# Patient Record
Sex: FEMALE | Race: BLACK OR AFRICAN AMERICAN | Employment: OTHER | URBAN - METROPOLITAN AREA
[De-identification: names, ages, dates, MRNs, and addresses within clinical notes are randomized per-mention and may not be internally consistent; named-entity substitution may affect disease eponyms.]

---

## 2017-02-07 ENCOUNTER — TELEPHONE (OUTPATIENT)
Dept: SURGERY | Age: 57
End: 2017-02-07

## 2017-02-07 ENCOUNTER — OFFICE VISIT (OUTPATIENT)
Dept: SURGERY | Age: 57
End: 2017-02-07

## 2017-02-07 VITALS
SYSTOLIC BLOOD PRESSURE: 110 MMHG | DIASTOLIC BLOOD PRESSURE: 70 MMHG | HEART RATE: 74 BPM | WEIGHT: 248 LBS | OXYGEN SATURATION: 96 % | HEIGHT: 62 IN | BODY MASS INDEX: 45.64 KG/M2 | TEMPERATURE: 98.4 F | RESPIRATION RATE: 20 BRPM

## 2017-02-07 DIAGNOSIS — K82.8 PORCELAIN GALLBLADDER: Primary | ICD-10-CM

## 2017-02-07 NOTE — PROGRESS NOTES
1. Have you been to the ER, urgent care clinic since your last visit? Hospitalized since your last visit? No    2. Have you seen or consulted any other health care providers outside of the [address] [place] [address] since your last visit? Include any pap smears or colon screening.  No

## 2017-02-07 NOTE — TELEPHONE ENCOUNTER
I called and spoke to the patient's mental health worker Lord Moncada and he has some questions about her upcoming surgery. I transferred him to Tamara Diaz our surgery scheduler.

## 2017-02-08 PROBLEM — K82.8 PORCELAIN GALLBLADDER: Status: ACTIVE | Noted: 2017-02-08

## 2017-02-09 NOTE — PROGRESS NOTES
Subjective:      Sakshi Higginbotham is a 49 y.o. female presents for ongoing evaluation of back pain and porcelain gallbladder. Appetite is fair. Eating a regular diet without difficulty. Bowel movements are constipated. The patient is voiding without difficulty. She continues to experience back pain; no nausea, vomiting, fever, or chills; no discrete abdominal pain. Objective:     Visit Vitals    /70 (BP 1 Location: Right arm, BP Patient Position: Sitting)    Pulse 74    Temp 98.4 °F (36.9 °C)    Resp 20    Ht 5' 2\" (1.575 m)    Wt 248 lb (112.5 kg)    SpO2 96%    BMI 45.36 kg/m2       General:  alert, cooperative, no distress, appears stated age, morbidly obese   Abdomen: deferred   Incision:   deferred     Assessment:     Porcelain gallbladder (chronic cholecystitis) with back pain; while her symptoms of back pain are not suggestive of biliary source, we discussed porcelain gallbladder and malignant potential and role of cholecystectomy. She will discuss further with her  and contact us if she desires to proceed with cholecystectomy. Plan:     1. Continue current medications. 2. Low fat diet. 3. Patient will contact us if she desires to proceed with cholecystectomy.

## 2017-02-09 NOTE — PATIENT INSTRUCTIONS
Cholecystectomy: Before Your Surgery  What is cholecystectomy? Cholecystectomy (se-bwu-rwd-LEX-tuh-bradford) is a type of surgery. It removes a diseased gallbladder. This surgery is usually done as a laparoscopic surgery. The doctor puts a lighted tube and other surgical tools through small cuts (incisions) in your belly. The tube is called a scope. It lets your doctor see your organs so he or she can do the surgery. The incisions leave scars that fade with time. Most people go home the same day. You probably will feel better each day. Most people have only a small amount of pain after 1 week. If you have a desk job, you can probably go back to work in 1 to 2 weeks. If you lift heavy objects or have a very active job, it may take up to 4 weeks. In some cases, open surgery is the best choice. Your doctor may choose open surgery in advance. Or he or she may choose it in the middle of laparoscopic surgery. In open surgery, the doctor makes a larger incision in your upper belly. If you have open surgery, you will probably stay in the hospital for 2 to 4 days. And it may take 4 to 6 weeks to get back to your normal routine. Follow-up care is a key part of your treatment and safety. Be sure to make and go to all appointments, and call your doctor if you are having problems. It's also a good idea to know your test results and keep a list of the medicines you take. What happens before surgery? Surgery can be stressful. This information will help you understand what you can expect. And it will help you safely prepare for surgery. Preparing for surgery  · Understand exactly what surgery is planned, along with the risks, benefits, and other options. · Tell your doctors ALL the medicines, vitamins, supplements, and herbal remedies you take. Some of these can increase the risk of bleeding or interact with anesthesia.   · If you take blood thinners, such as warfarin (Coumadin), clopidogrel (Plavix), or aspirin, be sure to talk to your doctor. He or she will tell you if you should stop taking these medicines before your surgery. Make sure that you understand exactly what your doctor wants you to do. · Your doctor will tell you which medicines to take or stop before your surgery. You may need to stop taking certain medicines a week or more before surgery. So talk to your doctor as soon as you can. · If you have an advance directive, let your doctor know. It may include a living will and a durable power of  for health care. Bring a copy to the hospital. If you don't have one, you may want to prepare one. It lets your doctor and loved ones know your health care wishes. Doctors advise that everyone prepare these papers before any type of surgery or procedure. · You may need to empty your colon with an enema or laxative. Your doctor will tell you how to do this. What happens on the day of surgery? · Follow the instructions exactly about when to stop eating and drinking. If you don't, your surgery may be canceled. If your doctor told you to take your medicines on the day of surgery, take them with only a sip of water. · Take a bath or shower before you come in for your surgery. Do not apply lotions, perfumes, deodorants, or nail polish. · Do not shave the surgical site yourself. · Take off all jewelry and piercings. And take out contact lenses, if you wear them. At the hospital or surgery center  · Bring a picture ID. · The area for surgery is often marked to make sure there are no errors. · You will be kept comfortable and safe by your anesthesia provider. You will be asleep during the surgery. · The surgery usually takes 1 to 2 hours. Going home  · Be sure you have someone to drive you home. Anesthesia and pain medicine make it unsafe for you to drive. · You will be given more specific instructions about recovering from your surgery.  They will cover things like diet, wound care, follow-up care, driving, and getting back to your normal routine. When should you call your doctor? · You have questions or concerns. · You don't understand how to prepare for your surgery. · You become ill before the surgery (such as fever, flu, or a cold). · You need to reschedule or have changed your mind about having the surgery. Where can you learn more? Go to http://sterling-ishaan.info/. Enter G195 in the search box to learn more about \"Cholecystectomy: Before Your Surgery. \"  Current as of: August 9, 2016  Content Version: 11.1  © 5396-4643 [employer]. Care instructions adapted under license by [employer] (which disclaims liability or warranty for this information). If you have questions about a medical condition or this instruction, always ask your healthcare professional. [address] any warranty or liability for your use of this information.

## 2017-02-24 ENCOUNTER — HOSPITAL ENCOUNTER (OUTPATIENT)
Dept: PREADMISSION TESTING | Age: 57
Discharge: HOME | End: 2017-02-24
Payer: MEDICARE

## 2017-02-24 ENCOUNTER — HOSPITAL ENCOUNTER (OUTPATIENT)
Dept: GENERAL RADIOLOGY | Age: 57
Discharge: HOME | End: 2017-02-24
Payer: MEDICARE

## 2017-02-24 VITALS
WEIGHT: 244.13 LBS | HEART RATE: 83 BPM | TEMPERATURE: 98.6 F | OXYGEN SATURATION: 96 % | RESPIRATION RATE: 18 BRPM | HEIGHT: 64 IN | BODY MASS INDEX: 41.68 KG/M2 | SYSTOLIC BLOOD PRESSURE: 109 MMHG | DIASTOLIC BLOOD PRESSURE: 72 MMHG

## 2017-02-24 LAB
ALBUMIN SERPL BCP-MCNC: 3.6 G/DL (ref 3.5–5)
ALBUMIN/GLOB SERPL: 1.1 {RATIO} (ref 1.1–2.2)
ALP SERPL-CCNC: 95 U/L (ref 45–117)
ALT SERPL-CCNC: 25 U/L (ref 12–78)
ANION GAP BLD CALC-SCNC: 8 MMOL/L (ref 5–15)
AST SERPL W P-5'-P-CCNC: 15 U/L (ref 15–37)
ATRIAL RATE: 66 BPM
BASOPHILS # BLD AUTO: 0 K/UL (ref 0–0.1)
BASOPHILS # BLD: 0 % (ref 0–1)
BILIRUB SERPL-MCNC: 0.3 MG/DL (ref 0.2–1)
BUN SERPL-MCNC: 17 MG/DL (ref 6–20)
BUN/CREAT SERPL: 22 (ref 12–20)
CALCIUM SERPL-MCNC: 8.8 MG/DL (ref 8.5–10.1)
CALCULATED P AXIS, ECG09: 47 DEGREES
CALCULATED R AXIS, ECG10: -23 DEGREES
CALCULATED T AXIS, ECG11: 2 DEGREES
CHLORIDE SERPL-SCNC: 103 MMOL/L (ref 97–108)
CO2 SERPL-SCNC: 26 MMOL/L (ref 21–32)
CREAT SERPL-MCNC: 0.77 MG/DL (ref 0.55–1.02)
DIAGNOSIS, 93000: NORMAL
EOSINOPHIL # BLD: 0.1 K/UL (ref 0–0.4)
EOSINOPHIL NFR BLD: 1 % (ref 0–7)
ERYTHROCYTE [DISTWIDTH] IN BLOOD BY AUTOMATED COUNT: 13.1 % (ref 11.5–14.5)
GLOBULIN SER CALC-MCNC: 3.2 G/DL (ref 2–4)
GLUCOSE SERPL-MCNC: 130 MG/DL (ref 65–100)
HCT VFR BLD AUTO: 36.9 % (ref 35–47)
HGB BLD-MCNC: 12.1 G/DL (ref 11.5–16)
LYMPHOCYTES # BLD AUTO: 42 % (ref 12–49)
LYMPHOCYTES # BLD: 3.8 K/UL (ref 0.8–3.5)
MAGNESIUM SERPL-MCNC: 2 MG/DL (ref 1.6–2.4)
MCH RBC QN AUTO: 31.5 PG (ref 26–34)
MCHC RBC AUTO-ENTMCNC: 32.8 G/DL (ref 30–36.5)
MCV RBC AUTO: 96.1 FL (ref 80–99)
MONOCYTES # BLD: 0.7 K/UL (ref 0–1)
MONOCYTES NFR BLD AUTO: 8 % (ref 5–13)
NEUTS SEG # BLD: 4.5 K/UL (ref 1.8–8)
NEUTS SEG NFR BLD AUTO: 49 % (ref 32–75)
P-R INTERVAL, ECG05: 128 MS
PLATELET # BLD AUTO: 188 K/UL (ref 150–400)
POTASSIUM SERPL-SCNC: 3.9 MMOL/L (ref 3.5–5.1)
PROT SERPL-MCNC: 6.8 G/DL (ref 6.4–8.2)
Q-T INTERVAL, ECG07: 420 MS
QRS DURATION, ECG06: 86 MS
QTC CALCULATION (BEZET), ECG08: 440 MS
RBC # BLD AUTO: 3.84 M/UL (ref 3.8–5.2)
SODIUM SERPL-SCNC: 137 MMOL/L (ref 136–145)
VENTRICULAR RATE, ECG03: 66 BPM
WBC # BLD AUTO: 9 K/UL (ref 3.6–11)

## 2017-02-24 PROCEDURE — 93005 ELECTROCARDIOGRAM TRACING: CPT

## 2017-02-24 PROCEDURE — 85025 COMPLETE CBC W/AUTO DIFF WBC: CPT | Performed by: SURGERY

## 2017-02-24 PROCEDURE — 80053 COMPREHEN METABOLIC PANEL: CPT | Performed by: SURGERY

## 2017-02-24 PROCEDURE — 83735 ASSAY OF MAGNESIUM: CPT | Performed by: SURGERY

## 2017-02-24 PROCEDURE — 71020 XR CHEST PA LAT: CPT

## 2017-02-24 RX ORDER — LOVASTATIN 40 MG/1
40 TABLET ORAL
COMMUNITY
End: 2022-07-04

## 2017-02-24 RX ORDER — HALOPERIDOL DECANOATE 100 MG/ML
200 INJECTION INTRAMUSCULAR
COMMUNITY
End: 2017-10-25

## 2017-03-02 ENCOUNTER — ANESTHESIA EVENT (OUTPATIENT)
Dept: SURGERY | Age: 57
End: 2017-03-02
Payer: MEDICARE

## 2017-03-03 ENCOUNTER — ANESTHESIA (OUTPATIENT)
Dept: SURGERY | Age: 57
End: 2017-03-03
Payer: MEDICARE

## 2017-03-03 ENCOUNTER — HOSPITAL ENCOUNTER (OUTPATIENT)
Age: 57
Setting detail: OBSERVATION
Discharge: HOME | End: 2017-03-05
Attending: SURGERY | Admitting: SURGERY
Payer: MEDICARE

## 2017-03-03 ENCOUNTER — SURGERY (OUTPATIENT)
Age: 57
End: 2017-03-03

## 2017-03-03 LAB
GLUCOSE BLD STRIP.AUTO-MCNC: 139 MG/DL (ref 65–100)
GLUCOSE BLD STRIP.AUTO-MCNC: 156 MG/DL (ref 65–100)
HCG UR QL: NEGATIVE
SERVICE CMNT-IMP: ABNORMAL
SERVICE CMNT-IMP: ABNORMAL

## 2017-03-03 PROCEDURE — 77030008756 HC TU IRR SUC STRY -B: Performed by: SURGERY

## 2017-03-03 PROCEDURE — 77030026438 HC STYL ET INTUB CARD -A: Performed by: ANESTHESIOLOGY

## 2017-03-03 PROCEDURE — 74011000250 HC RX REV CODE- 250: Performed by: ANESTHESIOLOGY

## 2017-03-03 PROCEDURE — 77030031139 HC SUT VCRL2 J&J -A: Performed by: SURGERY

## 2017-03-03 PROCEDURE — 77030020263 HC SOL INJ SOD CL0.9% LFCR 1000ML: Performed by: SURGERY

## 2017-03-03 PROCEDURE — 74011250637 HC RX REV CODE- 250/637: Performed by: SURGERY

## 2017-03-03 PROCEDURE — 77030009403 HC ELECTRD ENDO MEGA -B: Performed by: SURGERY

## 2017-03-03 PROCEDURE — 77030010507 HC ADH SKN DERMBND J&J -B: Performed by: SURGERY

## 2017-03-03 PROCEDURE — 88304 TISSUE EXAM BY PATHOLOGIST: CPT | Performed by: SURGERY

## 2017-03-03 PROCEDURE — 82962 GLUCOSE BLOOD TEST: CPT

## 2017-03-03 PROCEDURE — 74011250636 HC RX REV CODE- 250/636

## 2017-03-03 PROCEDURE — 74011000250 HC RX REV CODE- 250: Performed by: SURGERY

## 2017-03-03 PROCEDURE — 77030020747 HC TU INSUF ENDOSC TELE -A: Performed by: SURGERY

## 2017-03-03 PROCEDURE — 99218 HC RM OBSERVATION: CPT

## 2017-03-03 PROCEDURE — 77030008684 HC TU ET CUF COVD -B: Performed by: ANESTHESIOLOGY

## 2017-03-03 PROCEDURE — 77030002895 HC DEV VASC CLOSR COVD -B: Performed by: SURGERY

## 2017-03-03 PROCEDURE — 77030019908 HC STETH ESOPH SIMS -A: Performed by: ANESTHESIOLOGY

## 2017-03-03 PROCEDURE — 77030037032 HC INSRT SCIS CLICKLLINE DISP STOR -B: Performed by: SURGERY

## 2017-03-03 PROCEDURE — 77030012029 HC APPL CLP LIG COVD -C: Performed by: SURGERY

## 2017-03-03 PROCEDURE — 77030018836 HC SOL IRR NACL ICUM -A: Performed by: SURGERY

## 2017-03-03 PROCEDURE — 74011000250 HC RX REV CODE- 250

## 2017-03-03 PROCEDURE — 77030008771 HC TU NG SALEM SUMP -A: Performed by: ANESTHESIOLOGY

## 2017-03-03 PROCEDURE — 77030032490 HC SLV COMPR SCD KNE COVD -B: Performed by: SURGERY

## 2017-03-03 PROCEDURE — 77030011640 HC PAD GRND REM COVD -A: Performed by: SURGERY

## 2017-03-03 PROCEDURE — 81025 URINE PREGNANCY TEST: CPT

## 2017-03-03 PROCEDURE — 76210000016 HC OR PH I REC 1 TO 1.5 HR: Performed by: SURGERY

## 2017-03-03 PROCEDURE — 77030009852 HC PCH RTVR ENDOSC COVD -B: Performed by: SURGERY

## 2017-03-03 PROCEDURE — 74011250636 HC RX REV CODE- 250/636: Performed by: SURGERY

## 2017-03-03 PROCEDURE — 77030035045 HC TRCR ENDOSC VRSPRT BLDLSS COVD -B: Performed by: SURGERY

## 2017-03-03 PROCEDURE — 77030035048 HC TRCR ENDOSC OPTCL COVD -B: Performed by: SURGERY

## 2017-03-03 PROCEDURE — 77030013079 HC BLNKT BAIR HGGR 3M -A: Performed by: ANESTHESIOLOGY

## 2017-03-03 PROCEDURE — 77030035051: Performed by: SURGERY

## 2017-03-03 PROCEDURE — 77030002933 HC SUT MCRYL J&J -A: Performed by: SURGERY

## 2017-03-03 PROCEDURE — 76060000033 HC ANESTHESIA 1 TO 1.5 HR: Performed by: SURGERY

## 2017-03-03 PROCEDURE — 76010000149 HC OR TIME 1 TO 1.5 HR: Performed by: SURGERY

## 2017-03-03 PROCEDURE — 74011000258 HC RX REV CODE- 258: Performed by: SURGERY

## 2017-03-03 PROCEDURE — 74011250636 HC RX REV CODE- 250/636: Performed by: ANESTHESIOLOGY

## 2017-03-03 RX ORDER — MIDAZOLAM HYDROCHLORIDE 1 MG/ML
1 INJECTION, SOLUTION INTRAMUSCULAR; INTRAVENOUS AS NEEDED
Status: DISCONTINUED | OUTPATIENT
Start: 2017-03-03 | End: 2017-03-03 | Stop reason: HOSPADM

## 2017-03-03 RX ORDER — FENTANYL CITRATE 50 UG/ML
25 INJECTION, SOLUTION INTRAMUSCULAR; INTRAVENOUS
Status: DISCONTINUED | OUTPATIENT
Start: 2017-03-03 | End: 2017-03-03 | Stop reason: HOSPADM

## 2017-03-03 RX ORDER — SUCCINYLCHOLINE CHLORIDE 20 MG/ML
INJECTION INTRAMUSCULAR; INTRAVENOUS AS NEEDED
Status: DISCONTINUED | OUTPATIENT
Start: 2017-03-03 | End: 2017-03-03 | Stop reason: HOSPADM

## 2017-03-03 RX ORDER — MORPHINE SULFATE 10 MG/ML
2 INJECTION, SOLUTION INTRAMUSCULAR; INTRAVENOUS
Status: DISCONTINUED | OUTPATIENT
Start: 2017-03-03 | End: 2017-03-03 | Stop reason: HOSPADM

## 2017-03-03 RX ORDER — SODIUM CHLORIDE 0.9 % (FLUSH) 0.9 %
5-10 SYRINGE (ML) INJECTION EVERY 8 HOURS
Status: DISCONTINUED | OUTPATIENT
Start: 2017-03-03 | End: 2017-03-03 | Stop reason: HOSPADM

## 2017-03-03 RX ORDER — ROCURONIUM BROMIDE 10 MG/ML
INJECTION, SOLUTION INTRAVENOUS AS NEEDED
Status: DISCONTINUED | OUTPATIENT
Start: 2017-03-03 | End: 2017-03-03 | Stop reason: HOSPADM

## 2017-03-03 RX ORDER — ONDANSETRON 2 MG/ML
INJECTION INTRAMUSCULAR; INTRAVENOUS AS NEEDED
Status: DISCONTINUED | OUTPATIENT
Start: 2017-03-03 | End: 2017-03-03 | Stop reason: HOSPADM

## 2017-03-03 RX ORDER — SODIUM CHLORIDE, SODIUM LACTATE, POTASSIUM CHLORIDE, CALCIUM CHLORIDE 600; 310; 30; 20 MG/100ML; MG/100ML; MG/100ML; MG/100ML
75 INJECTION, SOLUTION INTRAVENOUS CONTINUOUS
Status: DISPENSED | OUTPATIENT
Start: 2017-03-03 | End: 2017-03-04

## 2017-03-03 RX ORDER — ESCITALOPRAM OXALATE 10 MG/1
20 TABLET ORAL DAILY
Status: DISCONTINUED | OUTPATIENT
Start: 2017-03-04 | End: 2017-03-05 | Stop reason: HOSPADM

## 2017-03-03 RX ORDER — LOVASTATIN 20 MG/1
40 TABLET ORAL
Status: DISCONTINUED | OUTPATIENT
Start: 2017-03-03 | End: 2017-03-05 | Stop reason: HOSPADM

## 2017-03-03 RX ORDER — ENOXAPARIN SODIUM 100 MG/ML
40 INJECTION SUBCUTANEOUS EVERY 24 HOURS
Status: DISCONTINUED | OUTPATIENT
Start: 2017-03-04 | End: 2017-03-05 | Stop reason: HOSPADM

## 2017-03-03 RX ORDER — PROPOFOL 10 MG/ML
INJECTION, EMULSION INTRAVENOUS AS NEEDED
Status: DISCONTINUED | OUTPATIENT
Start: 2017-03-03 | End: 2017-03-03 | Stop reason: HOSPADM

## 2017-03-03 RX ORDER — SODIUM CHLORIDE 0.9 % (FLUSH) 0.9 %
5-10 SYRINGE (ML) INJECTION AS NEEDED
Status: DISCONTINUED | OUTPATIENT
Start: 2017-03-03 | End: 2017-03-03 | Stop reason: HOSPADM

## 2017-03-03 RX ORDER — FENTANYL CITRATE 50 UG/ML
50 INJECTION, SOLUTION INTRAMUSCULAR; INTRAVENOUS AS NEEDED
Status: DISCONTINUED | OUTPATIENT
Start: 2017-03-03 | End: 2017-03-03 | Stop reason: HOSPADM

## 2017-03-03 RX ORDER — DEXTROSE, SODIUM CHLORIDE, SODIUM LACTATE, POTASSIUM CHLORIDE, AND CALCIUM CHLORIDE 5; .6; .31; .03; .02 G/100ML; G/100ML; G/100ML; G/100ML; G/100ML
125 INJECTION, SOLUTION INTRAVENOUS CONTINUOUS
Status: DISCONTINUED | OUTPATIENT
Start: 2017-03-03 | End: 2017-03-03 | Stop reason: HOSPADM

## 2017-03-03 RX ORDER — GLYCOPYRROLATE 0.2 MG/ML
INJECTION INTRAMUSCULAR; INTRAVENOUS AS NEEDED
Status: DISCONTINUED | OUTPATIENT
Start: 2017-03-03 | End: 2017-03-03 | Stop reason: HOSPADM

## 2017-03-03 RX ORDER — KETOROLAC TROMETHAMINE 30 MG/ML
INJECTION, SOLUTION INTRAMUSCULAR; INTRAVENOUS AS NEEDED
Status: DISCONTINUED | OUTPATIENT
Start: 2017-03-03 | End: 2017-03-03 | Stop reason: HOSPADM

## 2017-03-03 RX ORDER — SODIUM CHLORIDE 0.9 % (FLUSH) 0.9 %
5-10 SYRINGE (ML) INJECTION EVERY 8 HOURS
Status: DISCONTINUED | OUTPATIENT
Start: 2017-03-03 | End: 2017-03-05 | Stop reason: HOSPADM

## 2017-03-03 RX ORDER — LIDOCAINE HYDROCHLORIDE 10 MG/ML
0.5 INJECTION, SOLUTION EPIDURAL; INFILTRATION; INTRACAUDAL; PERINEURAL AS NEEDED
Status: DISCONTINUED | OUTPATIENT
Start: 2017-03-03 | End: 2017-03-03 | Stop reason: HOSPADM

## 2017-03-03 RX ORDER — MIDAZOLAM HYDROCHLORIDE 1 MG/ML
INJECTION, SOLUTION INTRAMUSCULAR; INTRAVENOUS AS NEEDED
Status: DISCONTINUED | OUTPATIENT
Start: 2017-03-03 | End: 2017-03-03 | Stop reason: HOSPADM

## 2017-03-03 RX ORDER — BENZTROPINE MESYLATE 1 MG/1
0.5 TABLET ORAL 2 TIMES DAILY
Status: DISCONTINUED | OUTPATIENT
Start: 2017-03-03 | End: 2017-03-05 | Stop reason: HOSPADM

## 2017-03-03 RX ORDER — OXYCODONE AND ACETAMINOPHEN 5; 325 MG/1; MG/1
1-2 TABLET ORAL
Status: DISCONTINUED | OUTPATIENT
Start: 2017-03-03 | End: 2017-03-05 | Stop reason: HOSPADM

## 2017-03-03 RX ORDER — ONDANSETRON 2 MG/ML
4 INJECTION INTRAMUSCULAR; INTRAVENOUS AS NEEDED
Status: DISCONTINUED | OUTPATIENT
Start: 2017-03-03 | End: 2017-03-03 | Stop reason: HOSPADM

## 2017-03-03 RX ORDER — KETOROLAC TROMETHAMINE 30 MG/ML
15 INJECTION, SOLUTION INTRAMUSCULAR; INTRAVENOUS EVERY 6 HOURS
Status: DISPENSED | OUTPATIENT
Start: 2017-03-03 | End: 2017-03-04

## 2017-03-03 RX ORDER — MIDAZOLAM HYDROCHLORIDE 1 MG/ML
1 INJECTION, SOLUTION INTRAMUSCULAR; INTRAVENOUS
Status: DISCONTINUED | OUTPATIENT
Start: 2017-03-03 | End: 2017-03-03 | Stop reason: HOSPADM

## 2017-03-03 RX ORDER — OXYCODONE AND ACETAMINOPHEN 5; 325 MG/1; MG/1
1-2 TABLET ORAL
Qty: 40 TAB | Refills: 0 | Status: SHIPPED | OUTPATIENT
Start: 2017-03-03 | End: 2017-10-25

## 2017-03-03 RX ORDER — HYDROMORPHONE HYDROCHLORIDE 1 MG/ML
1 INJECTION, SOLUTION INTRAMUSCULAR; INTRAVENOUS; SUBCUTANEOUS
Status: DISCONTINUED | OUTPATIENT
Start: 2017-03-03 | End: 2017-03-05 | Stop reason: HOSPADM

## 2017-03-03 RX ORDER — DIPHENHYDRAMINE HYDROCHLORIDE 50 MG/ML
12.5 INJECTION, SOLUTION INTRAMUSCULAR; INTRAVENOUS AS NEEDED
Status: DISCONTINUED | OUTPATIENT
Start: 2017-03-03 | End: 2017-03-03 | Stop reason: HOSPADM

## 2017-03-03 RX ORDER — HALOPERIDOL 10 MG/1
10 TABLET ORAL
Status: DISCONTINUED | OUTPATIENT
Start: 2017-03-03 | End: 2017-03-05 | Stop reason: HOSPADM

## 2017-03-03 RX ORDER — HYDROMORPHONE HYDROCHLORIDE 1 MG/ML
0.2 INJECTION, SOLUTION INTRAMUSCULAR; INTRAVENOUS; SUBCUTANEOUS
Status: DISCONTINUED | OUTPATIENT
Start: 2017-03-03 | End: 2017-03-03 | Stop reason: HOSPADM

## 2017-03-03 RX ORDER — FENTANYL CITRATE 50 UG/ML
INJECTION, SOLUTION INTRAMUSCULAR; INTRAVENOUS AS NEEDED
Status: DISCONTINUED | OUTPATIENT
Start: 2017-03-03 | End: 2017-03-03 | Stop reason: HOSPADM

## 2017-03-03 RX ORDER — POTASSIUM CHLORIDE 750 MG/1
10 TABLET, FILM COATED, EXTENDED RELEASE ORAL DAILY
Status: DISCONTINUED | OUTPATIENT
Start: 2017-03-04 | End: 2017-03-05 | Stop reason: HOSPADM

## 2017-03-03 RX ORDER — SODIUM CHLORIDE, SODIUM LACTATE, POTASSIUM CHLORIDE, CALCIUM CHLORIDE 600; 310; 30; 20 MG/100ML; MG/100ML; MG/100ML; MG/100ML
125 INJECTION, SOLUTION INTRAVENOUS CONTINUOUS
Status: DISCONTINUED | OUTPATIENT
Start: 2017-03-03 | End: 2017-03-03 | Stop reason: HOSPADM

## 2017-03-03 RX ORDER — ZIPRASIDONE HYDROCHLORIDE 40 MG/1
80 CAPSULE ORAL
Status: DISCONTINUED | OUTPATIENT
Start: 2017-03-03 | End: 2017-03-05 | Stop reason: HOSPADM

## 2017-03-03 RX ORDER — SODIUM CHLORIDE, SODIUM LACTATE, POTASSIUM CHLORIDE, CALCIUM CHLORIDE 600; 310; 30; 20 MG/100ML; MG/100ML; MG/100ML; MG/100ML
INJECTION, SOLUTION INTRAVENOUS
Status: DISCONTINUED | OUTPATIENT
Start: 2017-03-03 | End: 2017-03-03 | Stop reason: HOSPADM

## 2017-03-03 RX ORDER — LIDOCAINE HYDROCHLORIDE 20 MG/ML
INJECTION, SOLUTION EPIDURAL; INFILTRATION; INTRACAUDAL; PERINEURAL AS NEEDED
Status: DISCONTINUED | OUTPATIENT
Start: 2017-03-03 | End: 2017-03-03 | Stop reason: HOSPADM

## 2017-03-03 RX ORDER — BUPIVACAINE HYDROCHLORIDE 5 MG/ML
50 INJECTION, SOLUTION EPIDURAL; INTRACAUDAL ONCE
Status: COMPLETED | OUTPATIENT
Start: 2017-03-03 | End: 2017-03-03

## 2017-03-03 RX ORDER — METRONIDAZOLE 500 MG/100ML
500 INJECTION, SOLUTION INTRAVENOUS
Status: DISCONTINUED | OUTPATIENT
Start: 2017-03-03 | End: 2017-03-03 | Stop reason: HOSPADM

## 2017-03-03 RX ORDER — METRONIDAZOLE 500 MG/100ML
500 INJECTION, SOLUTION INTRAVENOUS
Status: COMPLETED | OUTPATIENT
Start: 2017-03-03 | End: 2017-03-03

## 2017-03-03 RX ORDER — NEOSTIGMINE METHYLSULFATE 1 MG/ML
INJECTION INTRAVENOUS AS NEEDED
Status: DISCONTINUED | OUTPATIENT
Start: 2017-03-03 | End: 2017-03-03 | Stop reason: HOSPADM

## 2017-03-03 RX ORDER — DIVALPROEX SODIUM 500 MG/1
500 TABLET, EXTENDED RELEASE ORAL
Status: DISCONTINUED | OUTPATIENT
Start: 2017-03-03 | End: 2017-03-05 | Stop reason: HOSPADM

## 2017-03-03 RX ORDER — ONDANSETRON 2 MG/ML
4 INJECTION INTRAMUSCULAR; INTRAVENOUS
Status: DISCONTINUED | OUTPATIENT
Start: 2017-03-03 | End: 2017-03-05 | Stop reason: HOSPADM

## 2017-03-03 RX ORDER — OXYCODONE HYDROCHLORIDE 5 MG/1
5 TABLET ORAL AS NEEDED
Status: DISCONTINUED | OUTPATIENT
Start: 2017-03-03 | End: 2017-03-03 | Stop reason: HOSPADM

## 2017-03-03 RX ORDER — SODIUM CHLORIDE 0.9 % (FLUSH) 0.9 %
5-10 SYRINGE (ML) INJECTION AS NEEDED
Status: DISCONTINUED | OUTPATIENT
Start: 2017-03-03 | End: 2017-03-05 | Stop reason: HOSPADM

## 2017-03-03 RX ADMIN — SODIUM CHLORIDE, SODIUM LACTATE, POTASSIUM CHLORIDE, AND CALCIUM CHLORIDE 75 ML/HR: 600; 310; 30; 20 INJECTION, SOLUTION INTRAVENOUS at 12:50

## 2017-03-03 RX ADMIN — ONDANSETRON 4 MG: 2 INJECTION INTRAMUSCULAR; INTRAVENOUS at 11:13

## 2017-03-03 RX ADMIN — ROCURONIUM BROMIDE 25 MG: 10 INJECTION, SOLUTION INTRAVENOUS at 10:36

## 2017-03-03 RX ADMIN — BENZTROPINE MESYLATE 0.5 MG: 1 TABLET ORAL at 17:38

## 2017-03-03 RX ADMIN — GLYCOPYRROLATE 0.5 MG: 0.2 INJECTION INTRAMUSCULAR; INTRAVENOUS at 11:17

## 2017-03-03 RX ADMIN — MIDAZOLAM HYDROCHLORIDE 1 MG: 1 INJECTION, SOLUTION INTRAMUSCULAR; INTRAVENOUS at 10:24

## 2017-03-03 RX ADMIN — SUCCINYLCHOLINE CHLORIDE 180 MG: 20 INJECTION INTRAMUSCULAR; INTRAVENOUS at 10:30

## 2017-03-03 RX ADMIN — METRONIDAZOLE 500 MG: 500 INJECTION, SOLUTION INTRAVENOUS at 10:40

## 2017-03-03 RX ADMIN — PROPOFOL 150 MG: 10 INJECTION, EMULSION INTRAVENOUS at 10:29

## 2017-03-03 RX ADMIN — GLYCOPYRROLATE 0.2 MG: 0.2 INJECTION INTRAMUSCULAR; INTRAVENOUS at 11:23

## 2017-03-03 RX ADMIN — Medication 10 ML: at 15:03

## 2017-03-03 RX ADMIN — ZIPRASIDONE HYDROCHLORIDE 80 MG: 40 CAPSULE ORAL at 22:00

## 2017-03-03 RX ADMIN — LOVASTATIN 40 MG: 20 TABLET ORAL at 22:00

## 2017-03-03 RX ADMIN — KETOROLAC TROMETHAMINE 30 MG: 30 INJECTION, SOLUTION INTRAMUSCULAR; INTRAVENOUS at 11:17

## 2017-03-03 RX ADMIN — NEOSTIGMINE METHYLSULFATE 3 MG: 1 INJECTION INTRAVENOUS at 11:17

## 2017-03-03 RX ADMIN — ROCURONIUM BROMIDE 5 MG: 10 INJECTION, SOLUTION INTRAVENOUS at 10:29

## 2017-03-03 RX ADMIN — LIDOCAINE HYDROCHLORIDE 0.5 ML: 10 INJECTION, SOLUTION EPIDURAL; INFILTRATION; INTRACAUDAL; PERINEURAL at 08:53

## 2017-03-03 RX ADMIN — BUPIVACAINE HYDROCHLORIDE 9 ML: 5 INJECTION, SOLUTION EPIDURAL; INTRACAUDAL; PERINEURAL at 10:40

## 2017-03-03 RX ADMIN — Medication 10 ML: at 21:47

## 2017-03-03 RX ADMIN — FENTANYL CITRATE 25 MCG: 50 INJECTION, SOLUTION INTRAMUSCULAR; INTRAVENOUS at 11:56

## 2017-03-03 RX ADMIN — DIVALPROEX SODIUM 500 MG: 500 TABLET, FILM COATED, EXTENDED RELEASE ORAL at 22:00

## 2017-03-03 RX ADMIN — FENTANYL CITRATE 50 MCG: 50 INJECTION, SOLUTION INTRAMUSCULAR; INTRAVENOUS at 10:29

## 2017-03-03 RX ADMIN — NEOSTIGMINE METHYLSULFATE 1 MG: 1 INJECTION INTRAVENOUS at 11:23

## 2017-03-03 RX ADMIN — KETOROLAC TROMETHAMINE 15 MG: 30 INJECTION, SOLUTION INTRAMUSCULAR at 17:38

## 2017-03-03 RX ADMIN — LIDOCAINE HYDROCHLORIDE 100 MG: 20 INJECTION, SOLUTION EPIDURAL; INFILTRATION; INTRACAUDAL; PERINEURAL at 10:29

## 2017-03-03 RX ADMIN — SODIUM CHLORIDE, SODIUM LACTATE, POTASSIUM CHLORIDE, CALCIUM CHLORIDE: 600; 310; 30; 20 INJECTION, SOLUTION INTRAVENOUS at 10:13

## 2017-03-03 RX ADMIN — GENTAMICIN SULFATE 400 MG: 40 INJECTION, SOLUTION INTRAMUSCULAR; INTRAVENOUS at 10:36

## 2017-03-03 RX ADMIN — FENTANYL CITRATE 50 MCG: 50 INJECTION, SOLUTION INTRAMUSCULAR; INTRAVENOUS at 10:52

## 2017-03-03 RX ADMIN — MIDAZOLAM HYDROCHLORIDE 1 MG: 1 INJECTION, SOLUTION INTRAMUSCULAR; INTRAVENOUS at 10:19

## 2017-03-03 RX ADMIN — SODIUM CHLORIDE, SODIUM LACTATE, POTASSIUM CHLORIDE, AND CALCIUM CHLORIDE 125 ML/HR: 600; 310; 30; 20 INJECTION, SOLUTION INTRAVENOUS at 08:53

## 2017-03-03 RX ADMIN — HALOPERIDOL 10 MG: 10 TABLET ORAL at 22:00

## 2017-03-03 RX ADMIN — OXYCODONE HYDROCHLORIDE AND ACETAMINOPHEN 2 TABLET: 5; 325 TABLET ORAL at 15:03

## 2017-03-03 NOTE — PROGRESS NOTES
Chart reviewed. CM met with pt and pt's [facility]  Shirley Me ([phone]) to introduce role of CM and discuss discharge needs. Pt lives alone in a private residence in [place]. Pt is independent with ADLs and uses no DME. Pt gets daily visits by [facility] at her home for medication management. Per Danette Kumari, pt is  but has limited contact with her . Kortney Espinoza reported that pt's  visits about once a month to give pt money to pay her rent. [facility] CM  stated he has been working with this patient for about three years and has never met any of her family. Emergency contact is pt's sister Diamond Carlson ([phone]). Pt asked that her son Umm Montero ([phone]) also be added as an emergency contact. CM updated this information in pt chart. Confirmed insurance is [employer]. Pt gets medications at [facility]. Anticipate pt will discharge home tomorrow. Patient's [facility] CM Shirley Mejia ([phone]) is to be contacted when pt is ready for discharge. Kortney Espinoza or his colleague Emelyn Chamberlain will transport pt home via car at discharge. Care Management Interventions  PCP Verified by CM: Yes (Dr. Cyrus Bumpers ([phone]))  Mode of Transport at Discharge:  Other (see comment) (Patient's [facility]  Shirley Mejia ([phone]))  Transition of Care Consult (CM Consult): Discharge Planning  MyChart Signup: No  Discharge Durable Medical Equipment: No  Physical Therapy Consult: No  Occupational Therapy Consult: No  Speech Therapy Consult: No  Current Support Network: Own Home, Lives Alone, Family Lives Nearby  Confirm Follow Up Transport: Other (see comment) ([facility] EMMANUEL Espinoza will transport pt home via car)  Plan discussed with Pt/Family/Caregiver: Yes  Discharge Location  Discharge Placement: Home (home, TBD)    ANYI Soni/CRM

## 2017-03-03 NOTE — PROGRESS NOTES
Primary Nurse Suleman Najera RN and Hans Liu RN performed a dual skin assessment on this patient Impairment noted- see wound doc flow sheet  Rajat score is 20

## 2017-03-03 NOTE — ROUTINE PROCESS
Patient: Aida Agosto MRN: [id number]  SSN: [id number]   YOB: 1960  Age: 49 y.o. Sex: female     Patient is status post Procedure(s):  LAPAROSCOPIC CHOLECYSTECTOMY. Surgeon(s) and Role:     * Lalo Nelson MD - Primary    Local/Dose/Irrigation:                 Peripheral IV 03/03/17 Right Hand (Active)   Site Assessment Clean, dry, & intact 3/3/2017  8:53 AM   Phlebitis Assessment 0 3/3/2017  8:53 AM   Infiltration Assessment 0 3/3/2017  8:53 AM   Dressing Status New 3/3/2017  8:53 AM   Dressing Type Transparent 3/3/2017  8:53 AM   Hub Color/Line Status Pink; Infusing 3/3/2017  8:53 AM            Airway - Endotracheal Tube 03/03/17 Oral (Active)                   Dressing/Packing:  Wound Abdomen Anterior-DRESSING TYPE: Topical skin adhesive/glue (03/03/17 1100)  Splint/Cast:  ]    Other:

## 2017-03-03 NOTE — IP AVS SNAPSHOT
Current Discharge Medication List  
  
Take these medications at their scheduled times Dose & Instructions Dispensing Information Comments Morning Noon Evening Bedtime  
 benztropine 0.5 mg tablet Commonly known as:  COGENTIN Your next dose is: Today, Tomorrow Other:  ____________ Dose:  0.5 mg Take 0.5 mg by mouth two (2) times a day. Refills:  0  
     
   
   
   
  
 chlorhexidine 0.12 % solution Commonly known as:  PERIDEX Your next dose is: Today, Tomorrow Other:  ____________ Dose:  15 mL  
15 mL by Swish and Spit route two (2) times a day for 14 days. Quantity:  420 mL Refills:  0  
     
   
   
   
  
 divalproex  mg ER tablet Commonly known as:  DEPAKOTE ER Your next dose is: Today, Tomorrow Other:  ____________ Dose:  500 mg Take 500 mg by mouth nightly. Refills:  0  
     
   
   
   
  
 escitalopram oxalate 20 mg tablet Commonly known as:  Bismark Emerald Your next dose is: Today, Tomorrow Other:  ____________ Dose:  20 mg Take 20 mg by mouth daily. Refills:  0  
     
   
   
   
  
 haloperidol 10 mg tablet Commonly known as:  HALDOL Your next dose is: Today, Tomorrow Other:  ____________ Dose:  10 mg Take 10 mg by mouth nightly. Refills:  0  
     
   
   
   
  
 haloperidol decanoate 100 mg/mL injection Commonly known as:  HALDOL DECANOATE Your next dose is: Today, Tomorrow Other:  ____________ Dose:  200 mg  
200 mg by IntraMUSCular route every twenty-eight (28) days. Refills:  0  
     
   
   
   
  
 lovastatin 40 mg tablet Commonly known as:  MEVACOR Your next dose is: Today, Tomorrow Other:  ____________ Dose:  40 mg Take 40 mg by mouth nightly. Refills:  0 POTASSIUM CHLORIDE SR 10 MEQ TAB Your next dose is: Today, Tomorrow Other:  ____________ Dose:  10 mEq 10 mEq daily. Refills:  0  
     
   
   
   
  
 ziprasidone 80 mg capsule Commonly known as:  Chet Blaze Your next dose is: Today, Tomorrow Other:  ____________ Dose:  80 mg Take 80 mg by mouth nightly. Refills:  0 Take these medications as needed Dose & Instructions Dispensing Information Comments Morning Noon Evening Bedtime  
 oxyCODONE-acetaminophen 5-325 mg per tablet Commonly known as:  PERCOCET Your next dose is: Today, Tomorrow Other:  ____________ Dose:  1-2 Tab Take 1-2 Tabs by mouth every four (4) hours as needed for Pain. Max Daily Amount: 12 Tabs. Quantity:  40 Tab Refills:  0 Where to Get Your Medications Information about where to get these medications is not yet available ! Ask your nurse or doctor about these medications  
  chlorhexidine 0.12 % solution  
 oxyCODONE-acetaminophen 5-325 mg per tablet

## 2017-03-03 NOTE — ANESTHESIA PREPROCEDURE EVALUATION
Anesthetic History   No history of anesthetic complications            Review of Systems / Medical History  Patient summary reviewed, nursing notes reviewed and pertinent labs reviewed    Pulmonary  Within defined limits                 Neuro/Psych   Within defined limits           Cardiovascular  Within defined limits                     GI/Hepatic/Renal  Within defined limits              Endo/Other    Diabetes         Other Findings            Physical Exam    Airway  Mallampati: II  TM Distance: > 6 cm  Neck ROM: normal range of motion   Mouth opening: Normal     Cardiovascular  Regular rate and rhythm,  S1 and S2 normal,  no murmur, click, rub, or gallop             Dental  No notable dental hx       Pulmonary  Breath sounds clear to auscultation               Abdominal  GI exam deferred       Other Findings            Anesthetic Plan    ASA: 2  Anesthesia type: general          Induction: Intravenous  Anesthetic plan and risks discussed with: Patient

## 2017-03-03 NOTE — DISCHARGE INSTRUCTIONS
Future Appointments  Date Time Provider Genesis Diaz   3/20/2017 11:00 AM Flora Lopez NP [facility] INDU SCHED          Cholecystectomy: What to Expect at [address] [facility]  After your surgery, it is normal to feel weak and tired for several days after you return home. Your belly may be swollen. If you had laparoscopic surgery, you may also have pain in your shoulder for about 24 hours. You may have gas or need to burp a lot at first, and a few people get diarrhea. The diarrhea usually goes away in 2 to 4 weeks, but it may last longer. How quickly you recover depends on whether you had a laparoscopic or open surgery. · For a laparoscopic surgery, most people can go back to work or their normal routine in 1 to 2 weeks, but it may take longer, depending on the type of work you do. · For an open surgery, it will probably take 4 to 6 weeks before you get back to your normal routine. This care sheet gives you a general idea about how long it will take for you to recover. However, each person recovers at a different pace. Follow the steps below to get better as quickly as possible. How can you care for yourself at home? Activity  · Rest when you feel tired. Getting enough sleep will help you recover. · Try to walk each day. Start out by walking a little more than you did the day before. Gradually increase the amount you walk. Walking boosts blood flow and helps prevent pneumonia and constipation. · For about 2 to 4 weeks, avoid lifting anything that would make you strain. This may include a child, heavy grocery bags and milk containers, a heavy briefcase or backpack, cat litter or dog food bags, or a vacuum . · Avoid strenuous activities, such as biking, jogging, weightlifting, and aerobic exercise, until your doctor says it is okay. · You may shower 24 hours after surgery. Pat the cuts (incisions) dry.  Do not take a bath for the first 2 weeks, or until your doctor tells you it is okay.  · You may drive when you are no longer taking pain medicine and can quickly move your foot from the gas pedal to the brake. You must also be able to sit comfortably for a long period of time, even if you do not plan to go far. You might get caught in traffic. · For a laparoscopic surgery, most people can go back to work or their normal routine in 1 to 2 weeks, but it may take longer. For an open surgery, it will probably take 4 to 6 weeks before you get back to your normal routine. · Your doctor will tell you when you can have sex again. Diet  · Eat smaller meals more often instead of fewer larger meals. You can eat a normal diet, but avoid eating fatty foods for about 1 month. Fatty foods include hamburger, whole milk, cheese, and many snack foods. If your stomach is upset, try bland, low-fat foods like plain rice, broiled chicken, toast, and yogurt. · Drink plenty of fluids (unless your doctor tells you not to). · If you have diarrhea, try avoiding spicy foods, dairy products, fatty foods, and alcohol. You can also watch to see if specific foods cause it, and stop eating them. If the diarrhea continues for more than 2 weeks, talk to your doctor. · You may notice that your bowel movements are not regular right after your surgery. This is common. Try to avoid constipation and straining with bowel movements. You may want to take a fiber supplement every day. If you have not had a bowel movement after a couple of days, ask your doctor about taking a mild laxative. Medicines  · Your doctor will tell you if and when you can restart your medicines. He or she will also give you instructions about taking any new medicines. · If you take blood thinners, such as warfarin (Coumadin), clopidogrel (Plavix), or aspirin, be sure to talk to your doctor. He or she will tell you if and when to start taking those medicines again. Make sure that you understand exactly what your doctor wants you to do.   · Take pain medicines exactly as directed. ¨ If the doctor gave you a prescription medicine for pain, take it as prescribed. ¨ If you are not taking a prescription pain medicine, take an over-the-counter medicine such as acetaminophen (Tylenol), ibuprofen (Advil, Motrin), or naproxen (Aleve). Read and follow all instructions on the label. ¨ Do not take two or more pain medicines at the same time unless the doctor told you to. Many pain medicines contain acetaminophen, which is Tylenol. Too much Tylenol can be harmful. · If you think your pain medicine is making you sick to your stomach:  ¨ Take your medicine after meals (unless your doctor tells you not to). ¨ Ask your doctor for a different pain medicine. · If your doctor prescribed antibiotics, take them as directed. Do not stop taking them just because you feel better. You need to take the full course of antibiotics. Incision care  · If you have strips of tape on the incision, or cut, leave the tape on for a week or until it falls off. · After 24 to 48 hours, wash the area daily with warm, soapy water, and pat it dry. · You may have staples to hold the cut together. Keep them dry until your doctor takes them out. This is usually in 7 to 10 days. · Keep the area clean and dry. You may cover it with a gauze bandage if it weeps or rubs against clothing. Change the bandage every day. Ice  · To reduce swelling and pain, put ice or a cold pack on your belly for 10 to 20 minutes at a time. Do this every 1 to 2 hours. Put a thin cloth between the ice and your skin. Follow-up care is a key part of your treatment and safety. Be sure to make and go to all appointments, and call your doctor if you are having problems. It's also a good idea to know your test results and keep a list of the medicines you take. When should you call for help? Call 911 anytime you think you may need emergency care. For example, call if:  · You passed out (lost consciousness).   · You have severe trouble breathing. · You have sudden chest pain and shortness of breath, or you cough up blood. Call your doctor now or seek immediate medical care if:  · You are sick to your stomach and cannot drink fluids. · You have pain that does not get better when you take your pain medicine. · You have signs of infection, such as:  ¨ Increased pain, swelling, warmth, or redness. ¨ Red streaks leading from the incision. ¨ Pus draining from the incision. ¨ Swollen lymph nodes in your neck, armpits, or groin. ¨ A fever. · Your urine turns dark brown or your stool is light-colored or dominick-colored. · Your skin or the whites of your eyes turn yellow. · Bright red blood has soaked through a large bandage over your incision. · You have signs of a blood clot, such as:  ¨ Pain in your calf, back of knee, thigh, or groin. ¨ Redness and swelling in your leg or groin. · You have trouble passing urine or stool, especially if you have mild pain or swelling in your lower belly. Watch closely for any changes in your health, and be sure to contact your doctor if:  · You had a laparoscopic surgery and your shoulder pain lasts more than 24 hours. · You do not have a bowel movement after taking a laxative. Where can you learn more? Go to http://sterling-ishaan.info/. Enter [phone] [id number] in the search box to learn more about \"Cholecystectomy: What to Expect at Home. \"  Current as of: August 9, 2016  Content Version: 11.1  © 2518-8002 [employer]. Care instructions adapted under license by [employer] (which disclaims liability or warranty for this information). If you have questions about a medical condition or this instruction, always ask your healthcare professional. [place] [address] any warranty or liability for your use of this information.         DISCHARGE SUMMARY from Nurse    The following personal items are in your possession at time of discharge:    Dental Appliances: None  Visual Aid: Glasses, With patient     Home Medications: None  Jewelry: None  Clothing: Undergarments, Shirt, Pants  Other Valuables: None  Personal Items Sent to Safe: none          PATIENT INSTRUCTIONS:    After general anesthesia or intravenous sedation, for 24 hours or while taking prescription Narcotics:  · Limit your activities  · Do not drive and operate hazardous machinery  · Do not make important personal or business decisions  · Do  not drink alcoholic beverages  · If you have not urinated within 8 hours after discharge, please contact your surgeon on call. Report the following to your surgeon:  · Excessive pain, swelling, redness or odor of or around the surgical area  · Temperature over 100.5  · Nausea and vomiting lasting longer than 4 hours or if unable to take medications  · Any signs of decreased circulation or nerve impairment to extremity: change in color, persistent  numbness, tingling, coldness or increase pain  · Any questions        These are general instructions for a healthy lifestyle:    No smoking/ No tobacco products/ Avoid exposure to second hand smoke    Surgeon General's Warning:  Quitting smoking now greatly reduces serious risk to your health. Obesity, smoking, and sedentary lifestyle greatly increases your risk for illness    A healthy diet, regular physical exercise & weight monitoring are important for maintaining a healthy lifestyle    You may be retaining fluid if you have a history of heart failure or if you experience any of the following symptoms:  Weight gain of 3 pounds or more overnight or 5 pounds in a week, increased swelling in our hands or feet or shortness of breath while lying flat in bed. Please call your doctor as soon as you notice any of these symptoms; do not wait until your next office visit.     Recognize signs and symptoms of STROKE:    F-face looks uneven    A-arms unable to move or move unevenly    S-speech slurred or non-existent    T-time-call 911 as soon as signs and symptoms begin-DO NOT go       Back to bed or wait to see if you get better-TIME IS BRAIN. Warning Signs of HEART ATTACK     Call 911 if you have these symptoms:   Chest discomfort. Most heart attacks involve discomfort in the center of the chest that lasts more than a few minutes, or that goes away and comes back. It can feel like uncomfortable pressure, squeezing, fullness, or pain.  Discomfort in other areas of the upper body. Symptoms can include pain or discomfort in one or both arms, the back, neck, jaw, or stomach.  Shortness of breath with or without chest discomfort.  Other signs may include breaking out in a cold sweat, nausea, or lightheadedness. Don't wait more than five minutes to call 911 - MINUTES MATTER! Fast action can save your life. Calling 911 is almost always the fastest way to get lifesaving treatment. Emergency Medical Services staff can begin treatment when they arrive -- up to an hour sooner than if someone gets to the hospital by car. The discharge information has been reviewed with the patient. The patient verbalized understanding. Discharge medications reviewed with the patient and appropriate educational materials and side effects teaching were provided. Tipp24 Activation    Thank you for requesting access to [employer]ipp24. Please follow the instructions below to securely access and download your online medical record. Tipp24 allows you to send messages to your doctor, view your test results, renew your prescriptions, schedule appointments, and more. How Do I Sign Up? 1. In your internet browser, go to [email or web address]  2. Click on the First Time User? Click Here link in the Sign In box. You will be redirect to the New Member Sign Up page. 3. Enter your Tipp24 Access Code exactly as it appears below. You will not need to use this code after youve completed the sign-up process.  If you do not sign up before the expiration date, you must request a new code. PenBlade Access Code: [email or web address]  Expires: 5/10/2017 11:25 AM (This is the date your PenBlade access code will )    4. Enter the last four digits of your Social Security Number (xxxx) and Date of Birth (mm/dd/yyyy) as indicated and click Submit. You will be taken to the next sign-up page. 5. Create a PenBlade ID. This will be your PenBlade login ID and cannot be changed, so think of one that is secure and easy to remember. 6. Create a PenBlade password. You can change your password at any time. 7. Enter your Password Reset Question and Answer. This can be used at a later time if you forget your password. 8. Enter your e-mail address. You will receive e-mail notification when new information is available in [address] 9. Click Sign Up. You can now view and download portions of your medical record. 10. Click the Download Summary menu link to download a portable copy of your medical information. Additional Information    If you have questions, please visit the Frequently Asked Questions section of the PenBlade website at [email or web address] Remember, PenBlade is NOT to be used for urgent needs. For medical emergencies, dial 911.

## 2017-03-03 NOTE — BRIEF OP NOTE
BRIEF OPERATIVE NOTE    Date of Procedure: 3/3/2017   Preoperative Diagnosis: GALLSTONES  Postoperative Diagnosis: GALLSTONES    Procedure(s):  LAPAROSCOPIC CHOLECYSTECTOMY  Surgeon(s) and Role:     * Hoang Garcia MD - Primary            Surgical Staff:  Circ-1: Alice Rock RN  Circ-Relief: Pradeep Bender RN  Scrub Tech-1: Dee Dee Hinkle  Scrub Tech-Relief: Morales Floyd  Surg Asst-1: Rachelle Kyle  Event Time In   Incision Start 1040   Incision Close      Anesthesia: General   Estimated Blood Loss: 50 cc  Specimens:   ID Type Source Tests Collected by Time Destination   1 : Gall Bladder Fresh Abdomen  Hoang Garcia MD 3/3/2017 1042 Pathology      Findings: chronic cholecystitis   Complications: none  Implants: * No implants in log *

## 2017-03-03 NOTE — H&P
Subjective:       Kenny Malhotra is a 49 y.o. female who is being seen for evaluation of back pain. The pain is located in the upper midline back without radiation. Pain is described as dull and aching and measures 4/10 in intensity. Onset of pain was 12 months ago. Aggravating factors include activity. Alleviating factors include lying down. She denies abdominal pain, fever, chills, nausea, vomiting, chest pain, change in bowel habits, or wheezing.          Patient Active Problem List     Diagnosis Date Noted    Chronic midline thoracic back pain 08/25/2016    Gallstones 08/25/2016    Schizoaffective disorder, unspecified condition 05/22/2014            Past Medical History   Diagnosis Date    Diabetes ([facility] [address]      Other ill-defined conditions(799.89)         hypercholesteremia    Psychiatric disorder        History reviewed. No pertinent past surgical history. Social History   Substance Use Topics    Smoking status: Never Smoker    Smokeless tobacco: Not on file    Alcohol use No      History reviewed. No pertinent family history. Current Outpatient Prescriptions   Medication Sig    benztropine (COGENTIN) 0.5 mg tablet Take 0.5 mg by mouth two (2) times a day.  divalproex ER (DEPAKOTE ER) 500 mg ER tablet Take by mouth.  escitalopram oxalate (LEXAPRO) 20 mg tablet Take 20 mg by mouth daily.  haloperidol (HALDOL) 10 mg tablet Take 10 mg by mouth.  POTASSIUM CHLORIDE SR 10 MEQ TAB      ziprasidone (GEODON) 80 mg capsule Take 80 mg by mouth two (2) times daily (with meals).  haloperidol decanoate (HALDOL DECANOATE) 100 mg/mL injection 200 mg by IntraMUSCular route every twenty-eight (28) days.  lovastatin (MEVACOR) 10 mg tablet Take 40 mg by mouth nightly.  furosemide (LASIX) 40 mg tablet One dailyAM    albuterol (PROVENTIL HFA, VENTOLIN HFA) 90 mcg/actuation inhaler Take 2 Puffs by inhalation every six (6) hours as needed for Wheezing.     furosemide (LASIX) 20 mg tablet Take 20 mg by mouth daily.      No current facility-administered medications for this visit. Allergies   Allergen Reactions    Penicillins Rash         Review of Systems:   A complete review of systems was negative except as noted in the HPI.     Objective:          Visit Vitals    /78 (BP 1 Location: Left arm, BP Patient Position: At rest)    Pulse 66    Temp 98.7 °F (37.1 °C)    Resp 16    Ht 5' 4\" (1.626 m)    Wt 244 lb (110.7 kg)    LMP 02/05/2017    SpO2 100%    BMI 41.88 kg/m2        Physical Exam:  GENERAL: alert, cooperative, no distress, appears stated age, morbidly obese, EYE: negative, LYMPH NODES: Cervical, supraclavicular nodes normal. THROAT & NECK: dental caries noted, LUNG: clear to auscultation bilaterally, HEART: regular rate and rhythm, S1, S2 normal, no murmur. ABDOMEN: Obese, non-distended, soft; no pain with palpation, mass, or hernia. EXTREMITIES: extremities normal, atraumatic, no cyanosis or edema, SKIN: Normal., NEUROLOGIC: negative     Imaging:  reviewed CT        Assessment:      Back pain in setting of cholelithiasis, with porcelain gallbladder.     Plan:      1. I recommend proceeding with laparoscopic cholecystectomy. Risks reviewed to include bleeding, infection, conversion to open procedure, bile duct injury, bowel injury, and recurrent/persistent symptoms. She understands and desires to proceed. All questions answered.

## 2017-03-03 NOTE — ANESTHESIA POSTPROCEDURE EVALUATION
Post-Anesthesia Evaluation and Assessment    Patient: Anson Bardales MRN: [id number]  SSN: [id number]    YOB: 1960  Age: 49 y.o. Sex: female       Cardiovascular Function/Vital Signs  Visit Vitals    /87    Pulse 69    Temp 36.4 °C (97.6 °F)    Resp 22    Ht 5' 4\" (1.626 m)    Wt 110.7 kg (244 lb)    SpO2 98%    BMI 41.88 kg/m2       Patient is status post general anesthesia for Procedure(s):  LAPAROSCOPIC CHOLECYSTECTOMY. Nausea/Vomiting: None    Postoperative hydration reviewed and adequate. Pain:  Pain Scale 1: Numeric (0 - 10) (03/03/17 1230)  Pain Intensity 1: 0 (03/03/17 1230)   Managed    Neurological Status:   Neuro (WDL): Within Defined Limits (03/03/17 1152)   At baseline    Mental Status and Level of Consciousness: Arousable    Pulmonary Status:   O2 Device: CO2 nasal cannula (03/03/17 1152)   Adequate oxygenation and airway patent    Complications related to anesthesia: None    Post-anesthesia assessment completed.  No concerns    Signed By: Delores Carroll MD     March 3, 2017

## 2017-03-03 NOTE — IP AVS SNAPSHOT
[address] 
[phone] Patient: Duane Spearing MRN: [id number] :1960 You are allergic to the following Allergen Reactions Penicillins Rash Recent Documentation Height Weight Breastfeeding? BMI OB Status Smoking Status 1.626 m 110.7 kg No 41.88 kg/m2 Having regular periods Never Smoker Emergency Contacts Name Discharge Info Relation Home Work Mobile Mima Causey  Other Relative [6]   [phone] Pascale Simmons  Child [2]   [phone] About your hospitalization You were admitted on:  March 3, 2017 You last received care in the:  [place] [id number] You were discharged on:  2017 Unit phone number:  [phone] Why you were hospitalized Your primary diagnosis was:  Not on File Providers Seen During Your Hospitalizations Provider Role Specialty Primary office phone Wilian Westbrook MD Attending Provider General Surgery [phone] Your Primary Care Physician (PCP) Primary Care Physician Office Phone Office Fax [address] [phone] Follow-up Information Follow up With Details Comments [facility] Info [facility] 
[phone] Your Appointments 2017 11:00 AM EDT  
POST OP 10 MIN with Adán Jerome NP  
[facility] [phone] ([facility]) [address] [phone] Current Discharge Medication List  
  
START taking these medications Dose & Instructions Dispensing Information Comments Morning Noon Evening Bedtime  
 chlorhexidine 0.12 % solution Commonly known as:  PERIDEX Your next dose is: Today, Tomorrow Other:  _________ Dose:  15 mL 15 mL by Swish and Spit route two (2) times a day for 14 days. Quantity:  420 mL Refills:  0  
     
   
   
   
  
 oxyCODONE-acetaminophen 5-325 mg per tablet Commonly known as:  PERCOCET Your next dose is: Today, Tomorrow Other:  _________ Dose:  1-2 Tab Take 1-2 Tabs by mouth every four (4) hours as needed for Pain. Max Daily Amount: 12 Tabs. Quantity:  40 Tab Refills:  0 CONTINUE these medications which have NOT CHANGED Dose & Instructions Dispensing Information Comments Morning Noon Evening Bedtime  
 benztropine 0.5 mg tablet Commonly known as:  COGENTIN Your next dose is: Today, Tomorrow Other:  _________ Dose:  0.5 mg Take 0.5 mg by mouth two (2) times a day. Refills:  0  
     
   
   
   
  
 divalproex  mg ER tablet Commonly known as:  DEPAKOTE ER Your next dose is: Today, Tomorrow Other:  _________ Dose:  500 mg Take 500 mg by mouth nightly. Refills:  0  
     
   
   
   
  
 escitalopram oxalate 20 mg tablet Commonly known as:  Eagle Hands Your next dose is: Today, Tomorrow Other:  _________ Dose:  20 mg Take 20 mg by mouth daily. Refills:  0  
     
   
   
   
  
 haloperidol 10 mg tablet Commonly known as:  HALDOL Your next dose is: Today, Tomorrow Other:  _________ Dose:  10 mg Take 10 mg by mouth nightly. Refills:  0  
     
   
   
   
  
 haloperidol decanoate 100 mg/mL injection Commonly known as:  HALDOL DECANOATE Your next dose is: Today, Tomorrow Other:  _________ Dose:  200 mg  
200 mg by IntraMUSCular route every twenty-eight (28) days. Refills:  0  
     
   
   
   
  
 lovastatin 40 mg tablet Commonly known as:  MEVACOR Your next dose is: Today, Tomorrow Other:  _________ Dose:  40 mg Take 40 mg by mouth nightly. Refills:  0 POTASSIUM CHLORIDE SR 10 MEQ TAB Your next dose is: Today, Tomorrow Other:  _________ Dose:  10 mEq 10 mEq daily. Refills:  0  
     
   
   
   
  
 ziprasidone 80 mg capsule Commonly known as:  Rhunette Govern Your next dose is: Today, Tomorrow Other:  _________ Dose:  80 mg Take 80 mg by mouth nightly. Refills:  0 Where to Get Your Medications Information on where to get these meds will be given to you by the nurse or doctor. ! Ask your nurse or doctor about these medications  
  chlorhexidine 0.12 % solution  
 oxyCODONE-acetaminophen 5-325 mg per tablet Discharge Instructions Future Appointments Date Time Provider Genesis Diaz 3/20/2017 11:00 AM FRANKLIN Cash Cholecystectomy: What to Expect at [facility] Your Recovery After your surgery, it is normal to feel weak and tired for several days after you return home. Your belly may be swollen. If you had laparoscopic surgery, you may also have pain in your shoulder for about 24 hours. You may have gas or need to burp a lot at first, and a few people get diarrhea. The diarrhea usually goes away in 2 to 4 weeks, but it may last longer. How quickly you recover depends on whether you had a laparoscopic or open surgery. · For a laparoscopic surgery, most people can go back to work or their normal routine in 1 to 2 weeks, but it may take longer, depending on the type of work you do. · For an open surgery, it will probably take 4 to 6 weeks before you get back to your normal routine. This care sheet gives you a general idea about how long it will take for you to recover. However, each person recovers at a different pace. Follow the steps below to get better as quickly as possible. How can you care for yourself at home? Activity · Rest when you feel tired. Getting enough sleep will help you recover. · Try to walk each day. Start out by walking a little more than you did the day before. Gradually increase the amount you walk. Walking boosts blood flow and helps prevent pneumonia and constipation. · For about 2 to 4 weeks, avoid lifting anything that would make you strain. This may include a child, heavy grocery bags and milk containers, a heavy briefcase or backpack, cat litter or dog food bags, or a vacuum . · Avoid strenuous activities, such as biking, jogging, weightlifting, and aerobic exercise, until your doctor says it is okay. · You may shower 24 hours after surgery. Pat the cuts (incisions) dry. Do not take a bath for the first 2 weeks, or until your doctor tells you it is okay. · You may drive when you are no longer taking pain medicine and can quickly move your foot from the gas pedal to the brake. You must also be able to sit comfortably for a long period of time, even if you do not plan to go far. You might get caught in traffic. · For a laparoscopic surgery, most people can go back to work or their normal routine in 1 to 2 weeks, but it may take longer. For an open surgery, it will probably take 4 to 6 weeks before you get back to your normal routine. · Your doctor will tell you when you can have sex again. Diet · Eat smaller meals more often instead of fewer larger meals. You can eat a normal diet, but avoid eating fatty foods for about 1 month. Fatty foods include hamburger, whole milk, cheese, and many snack foods. If your stomach is upset, try bland, low-fat foods like plain rice, broiled chicken, toast, and yogurt. · Drink plenty of fluids (unless your doctor tells you not to). · If you have diarrhea, try avoiding spicy foods, dairy products, fatty foods, and alcohol. You can also watch to see if specific foods cause it, and stop eating them.  If the diarrhea continues for more than 2 weeks, talk to your doctor. · You may notice that your bowel movements are not regular right after your surgery. This is common. Try to avoid constipation and straining with bowel movements. You may want to take a fiber supplement every day. If you have not had a bowel movement after a couple of days, ask your doctor about taking a mild laxative. Medicines · Your doctor will tell you if and when you can restart your medicines. He or she will also give you instructions about taking any new medicines. · If you take blood thinners, such as warfarin (Coumadin), clopidogrel (Plavix), or aspirin, be sure to talk to your doctor. He or she will tell you if and when to start taking those medicines again. Make sure that you understand exactly what your doctor wants you to do. · Take pain medicines exactly as directed. ¨ If the doctor gave you a prescription medicine for pain, take it as prescribed. ¨ If you are not taking a prescription pain medicine, take an over-the-counter medicine such as acetaminophen (Tylenol), ibuprofen (Advil, Motrin), or naproxen (Aleve). Read and follow all instructions on the label. ¨ Do not take two or more pain medicines at the same time unless the doctor told you to. Many pain medicines contain acetaminophen, which is Tylenol. Too much Tylenol can be harmful. · If you think your pain medicine is making you sick to your stomach: 
¨ Take your medicine after meals (unless your doctor tells you not to). ¨ Ask your doctor for a different pain medicine. · If your doctor prescribed antibiotics, take them as directed. Do not stop taking them just because you feel better. You need to take the full course of antibiotics. Incision care · If you have strips of tape on the incision, or cut, leave the tape on for a week or until it falls off. · After 24 to 48 hours, wash the area daily with warm, soapy water, and pat it dry. · You may have staples to hold the cut together.  Keep them dry until your doctor takes them out. This is usually in 7 to 10 days. · Keep the area clean and dry. You may cover it with a gauze bandage if it weeps or rubs against clothing. Change the bandage every day. Ice · To reduce swelling and pain, put ice or a cold pack on your belly for 10 to 20 minutes at a time. Do this every 1 to 2 hours. Put a thin cloth between the ice and your skin. Follow-up care is a key part of your treatment and safety. Be sure to make and go to all appointments, and call your doctor if you are having problems. It's also a good idea to know your test results and keep a list of the medicines you take. When should you call for help? Call 911 anytime you think you may need emergency care. For example, call if: 
· You passed out (lost consciousness). · You have severe trouble breathing. · You have sudden chest pain and shortness of breath, or you cough up blood. Call your doctor now or seek immediate medical care if: 
· You are sick to your stomach and cannot drink fluids. · You have pain that does not get better when you take your pain medicine. · You have signs of infection, such as: 
¨ Increased pain, swelling, warmth, or redness. ¨ Red streaks leading from the incision. ¨ Pus draining from the incision. ¨ Swollen lymph nodes in your neck, armpits, or groin. ¨ A fever. · Your urine turns dark brown or your stool is light-colored or dominick-colored. · Your skin or the whites of your eyes turn yellow. · Bright red blood has soaked through a large bandage over your incision. · You have signs of a blood clot, such as: 
¨ Pain in your calf, back of knee, thigh, or groin. ¨ Redness and swelling in your leg or groin. · You have trouble passing urine or stool, especially if you have mild pain or swelling in your lower belly. Watch closely for any changes in your health, and be sure to contact your doctor if: 
· You had a laparoscopic surgery and your shoulder pain lasts more than 24 hours. · You do not have a bowel movement after taking a laxative. Where can you learn more? Go to http://sterling-ishaan.info/. Enter 202 [id number] [phone] in the search box to learn more about \"Cholecystectomy: What to Expect at Home. \" Current as of: August 9, 2016 Content Version: 11.1 © 2006-2016 [employer]. Care instructions adapted under license by [employer] (which disclaims liability or warranty for this information). If you have questions about a medical condition or this instruction, always ask your healthcare professional. [place] [address] any warranty or liability for your use of this information. DISCHARGE SUMMARY from Nurse The following personal items are in your possession at time of discharge: 
 
Dental Appliances: None Visual Aid: Glasses, With patient Home Medications: None Jewelry: None Clothing: Undergarments, Shirt, Pants Other Valuables: None Personal Items Sent to Safe: none PATIENT INSTRUCTIONS: 
 
After general anesthesia or intravenous sedation, for 24 hours or while taking prescription Narcotics: · Limit your activities · Do not drive and operate hazardous machinery · Do not make important personal or business decisions · Do  not drink alcoholic beverages · If you have not urinated within 8 hours after discharge, please contact your surgeon on call. Report the following to your surgeon: 
· Excessive pain, swelling, redness or odor of or around the surgical area · Temperature over 100.5 · Nausea and vomiting lasting longer than 4 hours or if unable to take medications · Any signs of decreased circulation or nerve impairment to extremity: change in color, persistent  numbness, tingling, coldness or increase pain · Any questions These are general instructions for a healthy lifestyle: No smoking/ No tobacco products/ Avoid exposure to second hand smoke Surgeon General's Warning:  Quitting smoking now greatly reduces serious risk to your health. Obesity, smoking, and sedentary lifestyle greatly increases your risk for illness A healthy diet, regular physical exercise & weight monitoring are important for maintaining a healthy lifestyle You may be retaining fluid if you have a history of heart failure or if you experience any of the following symptoms:  Weight gain of 3 pounds or more overnight or 5 pounds in a week, increased swelling in our hands or feet or shortness of breath while lying flat in bed. Please call your doctor as soon as you notice any of these symptoms; do not wait until your next office visit. Recognize signs and symptoms of STROKE: 
 
F-face looks uneven A-arms unable to move or move unevenly S-speech slurred or non-existent T-time-call 911 as soon as signs and symptoms begin-DO NOT go Back to bed or wait to see if you get better-TIME IS BRAIN. Warning Signs of HEART ATTACK Call 911 if you have these symptoms: 
? Chest discomfort. Most heart attacks involve discomfort in the center of the chest that lasts more than a few minutes, or that goes away and comes back. It can feel like uncomfortable pressure, squeezing, fullness, or pain. ? Discomfort in other areas of the upper body. Symptoms can include pain or discomfort in one or both arms, the back, neck, jaw, or stomach. ? Shortness of breath with or without chest discomfort. ? Other signs may include breaking out in a cold sweat, nausea, or lightheadedness. Don't wait more than five minutes to call [address]! Fast action can save your life. Calling 911 is almost always the fastest way to get lifesaving treatment. Emergency Medical Services staff can begin treatment when they arrive  up to an hour sooner than if someone gets to the hospital by car. The discharge information has been reviewed with the patient.   The patient verbalized understanding. Discharge medications reviewed with the patient and appropriate educational materials and side effects teaching were provided. MyChart Activation Thank you for requesting access to [employer]. Please follow the instructions below to securely access and download your online medical record. [employer] allows you to send messages to your doctor, view your test results, renew your prescriptions, schedule appointments, and more. How Do I Sign Up? 1. In your internet browser, go to [email or web address] 
2. Click on the First Time User? Click Here link in the Sign In box. You will be redirect to the New Member Sign Up page. 3. Enter your [employer] Access Code exactly as it appears below. You will not need to use this code after youve completed the sign-up process. If you do not sign up before the expiration date, you must request a new code. [employer] Access Code: [email or web address] Expires: 5/10/2017 11:25 AM (This is the date your [employer] access code will ) 4. Enter the last four digits of your Social Security Number (xxxx) and Date of Birth (mm/dd/yyyy) as indicated and click Submit. You will be taken to the next sign-up page. 5. Create a [employer] ID. This will be your [employer] login ID and cannot be changed, so think of one that is secure and easy to remember. 6. Create a [employer] password. You can change your password at any time. 7. Enter your Password Reset Question and Answer. This can be used at a later time if you forget your password. 8. Enter your e-mail address. You will receive e-mail notification when new information is available in [address] 9. Click Sign Up. You can now view and download portions of your medical record. 10. Click the Download Summary menu link to download a portable copy of your medical information. Additional Information If you have questions, please visit the Frequently Asked Questions section of the [employer] website at [email or web address] Remember, The [employer] is NOT to be used for urgent needs. For medical emergencies, dial 911. Discharge Orders None Introducing [facility] SERVICES! Nohmei Santos introduces [employer] patient portal. Now you can access parts of your medical record, email your doctor's office, and request medication refills online. 1. In your internet browser, go to [email or web address] 2. Click on the First Time User? Click Here link in the Sign In box. You will see the New Member Sign Up page. 3. Enter your [employer] Access Code exactly as it appears below. You will not need to use this code after youve completed the sign-up process. If you do not sign up before the expiration date, you must request a new code. · [employer] Access Code: [email or web address] Expires: 5/10/2017 11:25 AM 
 
4. Enter the last four digits of your Social Security Number (xxxx) and Date of Birth (mm/dd/yyyy) as indicated and click Submit. You will be taken to the next sign-up page. 5. Create a [employer] ID. This will be your [employer] login ID and cannot be changed, so think of one that is secure and easy to remember. 6. Create a [employer] password. You can change your password at any time. 7. Enter your Password Reset Question and Answer. This can be used at a later time if you forget your password. 8. Enter your e-mail address. You will receive e-mail notification when new information is available in [address] 9. Click Sign Up. You can now view and download portions of your medical record. 10. Click the Download Summary menu link to download a portable copy of your medical information. If you have questions, please visit the Frequently Asked Questions section of the [employer] website. Remember, [employer] is NOT to be used for urgent needs. For medical emergencies, dial 911. Now available from your iPhone and Android! General Information Please provide this summary of care documentation to your next provider. Patient Signature:  ____________________________________________________________ Date:  ____________________________________________________________  
  
Modesta Adenike Provider Signature:  ____________________________________________________________ Date:  ____________________________________________________________

## 2017-03-03 NOTE — PERIOP NOTES
TRANSFER - OUT REPORT:    Verbal report given to Eliana(name) on Anson Sheets  being transferred to [facility](unit) for routine progression of care       Report consisted of patients Situation, Background, Assessment and   Recommendations(SBAR). Information from the following report(s) SBAR, OR Summary, Intake/Output and MAR was reviewed with the receiving nurse. Lines:   Peripheral IV 03/03/17 Right Hand (Active)   Site Assessment Clean, dry, & intact 3/3/2017 11:52 AM   Phlebitis Assessment 0 3/3/2017 11:52 AM   Infiltration Assessment 0 3/3/2017 11:52 AM   Dressing Status Clean, dry, & intact 3/3/2017 11:52 AM   Dressing Type Transparent 3/3/2017  8:53 AM   Hub Color/Line Status Pink; Infusing 3/3/2017  8:53 AM        Opportunity for questions and clarification was provided.       Patient transported with:   [employer]et[employer]er

## 2017-03-04 LAB
ALBUMIN SERPL BCP-MCNC: 3.1 G/DL (ref 3.5–5)
ALBUMIN SERPL BCP-MCNC: 3.1 G/DL (ref 3.5–5)
ALBUMIN/GLOB SERPL: 0.9 {RATIO} (ref 1.1–2.2)
ALBUMIN/GLOB SERPL: 0.9 {RATIO} (ref 1.1–2.2)
ALP SERPL-CCNC: 149 U/L (ref 45–117)
ALP SERPL-CCNC: 164 U/L (ref 45–117)
ALT SERPL-CCNC: 207 U/L (ref 12–78)
ALT SERPL-CCNC: 208 U/L (ref 12–78)
ANION GAP BLD CALC-SCNC: 6 MMOL/L (ref 5–15)
ANION GAP BLD CALC-SCNC: 7 MMOL/L (ref 5–15)
AST SERPL W P-5'-P-CCNC: 320 U/L (ref 15–37)
AST SERPL W P-5'-P-CCNC: 457 U/L (ref 15–37)
BASOPHILS # BLD AUTO: 0 K/UL (ref 0–0.1)
BASOPHILS # BLD: 0 % (ref 0–1)
BILIRUB SERPL-MCNC: 0.5 MG/DL (ref 0.2–1)
BILIRUB SERPL-MCNC: 1 MG/DL (ref 0.2–1)
BUN SERPL-MCNC: 11 MG/DL (ref 6–20)
BUN SERPL-MCNC: 13 MG/DL (ref 6–20)
BUN/CREAT SERPL: 16 (ref 12–20)
BUN/CREAT SERPL: 18 (ref 12–20)
CALCIUM SERPL-MCNC: 8.2 MG/DL (ref 8.5–10.1)
CALCIUM SERPL-MCNC: 8.3 MG/DL (ref 8.5–10.1)
CHLORIDE SERPL-SCNC: 100 MMOL/L (ref 97–108)
CHLORIDE SERPL-SCNC: 102 MMOL/L (ref 97–108)
CO2 SERPL-SCNC: 28 MMOL/L (ref 21–32)
CO2 SERPL-SCNC: 29 MMOL/L (ref 21–32)
CREAT SERPL-MCNC: 0.67 MG/DL (ref 0.55–1.02)
CREAT SERPL-MCNC: 0.73 MG/DL (ref 0.55–1.02)
EOSINOPHIL # BLD: 0.1 K/UL (ref 0–0.4)
EOSINOPHIL NFR BLD: 2 % (ref 0–7)
ERYTHROCYTE [DISTWIDTH] IN BLOOD BY AUTOMATED COUNT: 13.1 % (ref 11.5–14.5)
GLOBULIN SER CALC-MCNC: 3.5 G/DL (ref 2–4)
GLOBULIN SER CALC-MCNC: 3.5 G/DL (ref 2–4)
GLUCOSE BLD STRIP.AUTO-MCNC: 135 MG/DL (ref 65–100)
GLUCOSE SERPL-MCNC: 126 MG/DL (ref 65–100)
GLUCOSE SERPL-MCNC: 98 MG/DL (ref 65–100)
HCT VFR BLD AUTO: 34.2 % (ref 35–47)
HGB BLD-MCNC: 11.1 G/DL (ref 11.5–16)
LYMPHOCYTES # BLD AUTO: 46 % (ref 12–49)
LYMPHOCYTES # BLD: 2.1 K/UL (ref 0.8–3.5)
MCH RBC QN AUTO: 31.4 PG (ref 26–34)
MCHC RBC AUTO-ENTMCNC: 32.5 G/DL (ref 30–36.5)
MCV RBC AUTO: 96.9 FL (ref 80–99)
MONOCYTES # BLD: 0.4 K/UL (ref 0–1)
MONOCYTES NFR BLD AUTO: 9 % (ref 5–13)
NEUTS SEG # BLD: 2 K/UL (ref 1.8–8)
NEUTS SEG NFR BLD AUTO: 43 % (ref 32–75)
PLATELET # BLD AUTO: 209 K/UL (ref 150–400)
POTASSIUM SERPL-SCNC: 3.7 MMOL/L (ref 3.5–5.1)
POTASSIUM SERPL-SCNC: 3.9 MMOL/L (ref 3.5–5.1)
PROT SERPL-MCNC: 6.6 G/DL (ref 6.4–8.2)
PROT SERPL-MCNC: 6.6 G/DL (ref 6.4–8.2)
RBC # BLD AUTO: 3.53 M/UL (ref 3.8–5.2)
SERVICE CMNT-IMP: ABNORMAL
SODIUM SERPL-SCNC: 135 MMOL/L (ref 136–145)
SODIUM SERPL-SCNC: 137 MMOL/L (ref 136–145)
WBC # BLD AUTO: 4.7 K/UL (ref 3.6–11)

## 2017-03-04 PROCEDURE — 74011250636 HC RX REV CODE- 250/636: Performed by: SURGERY

## 2017-03-04 PROCEDURE — 74011250637 HC RX REV CODE- 250/637: Performed by: NURSE PRACTITIONER

## 2017-03-04 PROCEDURE — 80053 COMPREHEN METABOLIC PANEL: CPT | Performed by: NURSE PRACTITIONER

## 2017-03-04 PROCEDURE — 99218 HC RM OBSERVATION: CPT

## 2017-03-04 PROCEDURE — 80053 COMPREHEN METABOLIC PANEL: CPT | Performed by: SURGERY

## 2017-03-04 PROCEDURE — 82962 GLUCOSE BLOOD TEST: CPT

## 2017-03-04 PROCEDURE — 74011250637 HC RX REV CODE- 250/637: Performed by: SURGERY

## 2017-03-04 PROCEDURE — 36415 COLL VENOUS BLD VENIPUNCTURE: CPT | Performed by: SURGERY

## 2017-03-04 PROCEDURE — 77030027138 HC INCENT SPIROMETER -A

## 2017-03-04 PROCEDURE — 85025 COMPLETE CBC W/AUTO DIFF WBC: CPT | Performed by: SURGERY

## 2017-03-04 RX ORDER — AMOXICILLIN 250 MG
1 CAPSULE ORAL DAILY
Status: DISCONTINUED | OUTPATIENT
Start: 2017-03-04 | End: 2017-03-05 | Stop reason: HOSPADM

## 2017-03-04 RX ADMIN — DOCUSATE SODIUM AND SENNOSIDES 1 TABLET: 8.6; 5 TABLET, FILM COATED ORAL at 11:44

## 2017-03-04 RX ADMIN — OXYCODONE HYDROCHLORIDE AND ACETAMINOPHEN 1 TABLET: 5; 325 TABLET ORAL at 18:09

## 2017-03-04 RX ADMIN — Medication 10 ML: at 13:31

## 2017-03-04 RX ADMIN — Medication 10 ML: at 06:06

## 2017-03-04 RX ADMIN — BENZTROPINE MESYLATE 0.5 MG: 1 TABLET ORAL at 09:12

## 2017-03-04 RX ADMIN — KETOROLAC TROMETHAMINE 15 MG: 30 INJECTION, SOLUTION INTRAMUSCULAR at 06:06

## 2017-03-04 RX ADMIN — Medication 10 ML: at 21:21

## 2017-03-04 RX ADMIN — KETOROLAC TROMETHAMINE 15 MG: 30 INJECTION, SOLUTION INTRAMUSCULAR at 00:10

## 2017-03-04 RX ADMIN — POTASSIUM CHLORIDE 10 MEQ: 750 TABLET, FILM COATED, EXTENDED RELEASE ORAL at 09:00

## 2017-03-04 RX ADMIN — BENZTROPINE MESYLATE 0.5 MG: 1 TABLET ORAL at 18:06

## 2017-03-04 RX ADMIN — OXYCODONE HYDROCHLORIDE AND ACETAMINOPHEN 1 TABLET: 5; 325 TABLET ORAL at 21:21

## 2017-03-04 RX ADMIN — SODIUM CHLORIDE, SODIUM LACTATE, POTASSIUM CHLORIDE, AND CALCIUM CHLORIDE 75 ML/HR: 600; 310; 30; 20 INJECTION, SOLUTION INTRAVENOUS at 04:45

## 2017-03-04 RX ADMIN — HALOPERIDOL 10 MG: 10 TABLET ORAL at 21:21

## 2017-03-04 RX ADMIN — ESCITALOPRAM OXALATE 20 MG: 10 TABLET ORAL at 09:11

## 2017-03-04 RX ADMIN — ZIPRASIDONE HYDROCHLORIDE 80 MG: 40 CAPSULE ORAL at 21:21

## 2017-03-04 RX ADMIN — ONDANSETRON 4 MG: 2 INJECTION INTRAMUSCULAR; INTRAVENOUS at 13:31

## 2017-03-04 RX ADMIN — ENOXAPARIN SODIUM 40 MG: 40 INJECTION SUBCUTANEOUS at 09:12

## 2017-03-04 RX ADMIN — LOVASTATIN 40 MG: 20 TABLET ORAL at 21:21

## 2017-03-04 RX ADMIN — DIVALPROEX SODIUM 500 MG: 500 TABLET, FILM COATED, EXTENDED RELEASE ORAL at 21:21

## 2017-03-04 RX ADMIN — KETOROLAC TROMETHAMINE 15 MG: 30 INJECTION, SOLUTION INTRAMUSCULAR at 11:43

## 2017-03-04 NOTE — PROGRESS NOTES
Daily Progress Note  [facility] at [address] Date: 3/3/2017  Post-Operative Day: 1 from Procedure(s):  LAPAROSCOPIC CHOLECYSTECTOMY     Subjective:     Last 24 hrs: In good spirits, feels great, tolerating PO.  + flatus, asking for stool softener. Note her LFTs are increased today. Objective:     Blood pressure 129/69, pulse 72, temperature 98 °F (36.7 °C), resp. rate 18, height 5' 4\" (1.626 m), weight 110.7 kg (244 lb), last menstrual period 2017, SpO2 97 %. Temp (24hrs), Av.9 °F (36.6 °C), Min:97.3 °F (36.3 °C), Max:98.4 °F (36.9 °C)      _____________________  Physical Exam:     Alert and Oriented, lying in bed, in no acute distress. Cardiovascular: RRR, no peripheral edema  Lungs:CTAB   Abdomen: + BS, soft, NT. Assessment:   S/p laparoscopic cholecystectomy    Elevated LFTs    Plan:     Re-check LFTs, if improved she can go home later today  PO as tolerated  Add stool softener per her request        Lonney Cowden, [address] Surgery at [place] [address],  [facility], [address] [place] [address]  [phone]    Data Review:    Recent Labs      17   0330   WBC  4.7   HGB  11.1*   HCT  34.2*   PLT  209     Recent Labs      17   0330   NA  135*   K  3.7   CL  100   CO2  28   GLU  126*   BUN  13   CREA  0.73   CA  8.3*   ALB  3.1*   TBILI  1.0   SGOT  457*   ALT  207*     No results for input(s): AML, LPSE in the last 72 hours.         ______________________  Medications:    Current Facility-Administered Medications   Medication Dose Route Frequency    benztropine (COGENTIN) tablet 0.5 mg  0.5 mg Oral BID    divalproex ER (DEPAKOTE ER) 24 hour tablet 500 mg  500 mg Oral QHS    escitalopram oxalate (LEXAPRO) tablet 20 mg  20 mg Oral DAILY    haloperidol (HALDOL) tablet 10 mg  10 mg Oral QHS    lovastatin (MEVACOR) tablet 40 mg  40 mg Oral QHS    potassium chloride SR (KLOR-CON 10) tablet 10 mEq  10 mEq Oral DAILY    ziprasidone (GEODON) capsule 80 mg  80 mg Oral QHS    lactated ringers infusion  75 mL/hr IntraVENous CONTINUOUS    sodium chloride (NS) flush 5-10 mL  5-10 mL IntraVENous Q8H    sodium chloride (NS) flush 5-10 mL  5-10 mL IntraVENous PRN    HYDROmorphone (PF) (DILAUDID) injection 1 mg  1 mg IntraVENous Q3H PRN    ondansetron (ZOFRAN) injection 4 mg  4 mg IntraVENous Q4H PRN    oxyCODONE-acetaminophen (PERCOCET) 5-325 mg per tablet 1-2 Tab  1-2 Tab Oral Q4H PRN    enoxaparin (LOVENOX) injection 40 mg  40 mg SubCUTAneous Q24H    ketorolac (TORADOL) injection 15 mg  15 mg IntraVENous Q6H       Pt seen and examined  Complaining of nausea    Gen- Alert in NAD  Lungs- CTA   H- RRR  Abd- S/juliette tender/ non distended. Recent Results (from the past 24 hour(s))   CBC WITH AUTOMATED DIFF    Collection Time: 03/04/17  3:30 AM   Result Value Ref Range    WBC 4.7 3.6 - 11.0 K/uL    RBC 3.53 (L) 3.80 - 5.20 M/uL    HGB 11.1 (L) 11.5 - 16.0 g/dL    HCT 34.2 (L) 35.0 - 47.0 %    MCV 96.9 80.0 - 99.0 FL    MCH 31.4 26.0 - 34.0 PG    MCHC 32.5 30.0 - 36.5 g/dL    RDW 13.1 11.5 - 14.5 %    PLATELET 034 741 - 841 K/uL    NEUTROPHILS 43 32 - 75 %    LYMPHOCYTES 46 12 - 49 %    MONOCYTES 9 5 - 13 %    EOSINOPHILS 2 0 - 7 %    BASOPHILS 0 0 - 1 %    ABS. NEUTROPHILS 2.0 1.8 - 8.0 K/UL    ABS. LYMPHOCYTES 2.1 0.8 - 3.5 K/UL    ABS. MONOCYTES 0.4 0.0 - 1.0 K/UL    ABS. EOSINOPHILS 0.1 0.0 - 0.4 K/UL    ABS.  BASOPHILS 0.0 0.0 - 0.1 K/UL   METABOLIC PANEL, COMPREHENSIVE    Collection Time: 03/04/17  3:30 AM   Result Value Ref Range    Sodium 135 (L) 136 - 145 mmol/L    Potassium 3.7 3.5 - 5.1 mmol/L    Chloride 100 97 - 108 mmol/L    CO2 28 21 - 32 mmol/L    Anion gap 7 5 - 15 mmol/L    Glucose 126 (H) 65 - 100 mg/dL    BUN 13 6 - 20 MG/DL    Creatinine 0.73 0.55 - 1.02 MG/DL    BUN/Creatinine ratio 18 12 - 20      GFR est AA >60 >60 ml/min/1.73m2    GFR est non-AA >60 >60 ml/min/1.73m2    Calcium 8.3 (L) 8.5 - 10.1 MG/DL Bilirubin, total 1.0 0.2 - 1.0 MG/DL    ALT (SGPT) 207 (H) 12 - 78 U/L    AST (SGOT) 457 (H) 15 - 37 U/L    Alk. phosphatase 149 (H) 45 - 117 U/L    Protein, total 6.6 6.4 - 8.2 g/dL    Albumin 3.1 (L) 3.5 - 5.0 g/dL    Globulin 3.5 2.0 - 4.0 g/dL    A-G Ratio 0.9 (L) 1.1 - 2.2     METABOLIC PANEL, COMPREHENSIVE    Collection Time: 03/04/17 11:55 AM   Result Value Ref Range    Sodium 137 136 - 145 mmol/L    Potassium 3.9 3.5 - 5.1 mmol/L    Chloride 102 97 - 108 mmol/L    CO2 29 21 - 32 mmol/L    Anion gap 6 5 - 15 mmol/L    Glucose 98 65 - 100 mg/dL    BUN 11 6 - 20 MG/DL    Creatinine 0.67 0.55 - 1.02 MG/DL    BUN/Creatinine ratio 16 12 - 20      GFR est AA >60 >60 ml/min/1.73m2    GFR est non-AA >60 >60 ml/min/1.73m2    Calcium 8.2 (L) 8.5 - 10.1 MG/DL    Bilirubin, total 0.5 0.2 - 1.0 MG/DL    ALT (SGPT) 208 (H) 12 - 78 U/L    AST (SGOT) 320 (H) 15 - 37 U/L    Alk. phosphatase 164 (H) 45 - 117 U/L    Protein, total 6.6 6.4 - 8.2 g/dL    Albumin 3.1 (L) 3.5 - 5.0 g/dL    Globulin 3.5 2.0 - 4.0 g/dL    A-G Ratio 0.9 (L) 1.1 - 2.2       S/p Lap con  Will continue to monitor LFT.'s   OOB and Ambulate  Diet as tolerated.

## 2017-03-04 NOTE — OP NOTES
[facility], [address]   OP NOTE       Name:  Braeden Coker   MR#:  [id number]   :  1960   Account #:  [de-identified]    Surgery Date:  2017   Date of Adm:  2017       PREOPERATIVE DIAGNOSIS: Chronic cholecystitis. POSTOPERATIVE DIAGNOSIS: Chronic cholecystitis. PROCEDURES PERFORMED: Laparoscopic cholecystectomy. SURGEON: Luzma Watson MD    ANESTHESIA: General endotracheal.    DRAINS: None. COUNTS: Sponge count correct. Needle count correct. SPECIMEN REMOVED: Gallbladder with contents. ESTIMATED BLOOD LOSS: 50 mL. COMPLICATIONS: None. INDICATIONS: The patient is a 56-year-old [facility] American female   with a 1-year history of back pain. Previous ultrasound reveals   gallstones, with a porcelain gallbladder. Her symptoms have persisted   despite physical therapy, her findings are consistent with chronic   cholecystitis. She is taken to the operating today for laparoscopic   cholecystectomy. FINDINGS: Chronic cholecystitis with cholelithiasis. PROCEDURE: The patient was identified as the correct patient in the   preoperative holding area and informed consent was confirmed. After   answering the patient's remaining questions and those of her case   manager, she was taken to the operating room and placed on the   operating room table in the supine position. Sequential compression   devices were placed on both lower extremities. Following the   uneventful initiation of general anesthesia, she was carefully secured   to the operating room table with foot board and safety strap in place. All potential pressure points were padded with egg crate. Her abdomen   was prepped and draped in the usual sterile fashion. Final time-out   was performed, and it was confirmed she had received intravenous   antibiotics. A 5 mm trocar was inserted through a small right-sided skin   incision using an Optiview technique.  After confirming intraperitoneal   location of the trocar tip, insufflation with carbon dioxide gas was   initiated. Once adequate working space had been developed, the 5   mm, 30-degree laparoscope was inserted. No signs of trocar injury   were present. The colon was noted to be distended in the right upper   quadrant. Adhesions between the omentum and the anterior   abdominal wall were identified in the area of the epigastrium. A 5 mm   trocar was inserted through a small subxiphoid incision using visual   guidance with the laparoscope. Lysis of adhesions was initiated using   a combination of blunt dissection and electrocautery. This freed the   epigastrium, allowing insertion of a 12 mm trocar using visual guidance   with the laparoscope. The patient was placed in the steep reverse   Trendelenburg position. An additional 5 mm trocar was inserted using   visual guidance with the laparoscope. Adhesions between pericolic fat   and the gallbladder were taken down using electrocautery. This   allowed the fundus to be grasped and retracted cephalad. The   gallbladder was noted to be thickened, consistent with porcelain   gallbladder status. The infundibulum was grasped and retracted   laterally, thus opening Calot triangle. Peritoneal tissue was stripped   away from the infundibulum in the direction of the portal structures. This allowed identification of the cystic artery and cystic duct. Each   structure was circumferentially dissected free from surrounding tissue. Once the critical view of safety had been achieved, the cystic artery   was doubly ligated between titanium Hemoclips and then divided. The   cystic duct was triply ligated between titanium Hemoclips and then   divided. The gallbladder was dissected free from the gallbladder fossa   of the liver using electrocautery and blunt dissection. Once freed from   the liver, the gallbladder was placed within a retrieval bag and removed   from the patient's body. Once pneumoperitoneum had been re-  established, the right upper quadrant was irrigated with sterile saline. After confirming adequate hemostasis, the epigastric 12 mm fascial   defect was closed with a 0 Vicryl suture using a laparoscopic suture   passer. Pneumoperitoneum was released, and all trocars were   removed. All wounds were infiltrated with 0.5% Marcaine without   epinephrine. All skin edges were reapproximated with a combination of   subcuticular 4-0 Monocryl suture and Dermabond. The patient   tolerated the procedure well. She was extubated in the operating room   and transported to the recovery area in stable condition. The attending   surgeon, Dr. Chang Gutiérrez, was scrubbed and present for the entire   procedure.         Estrellita Capone MD      [facility] / [facility]   D:  03/04/2017   10:39   T:  03/04/2017   13:15   Job #:  [id number]

## 2017-03-04 NOTE — ROUTINE PROCESS
Bedside and Verbal shift change report given to Corinne RN (oncoming nurse) by Earline Lundborg RN (offgoing nurse). Report included the following information SBAR. All opportunity for clarification/questions given.

## 2017-03-04 NOTE — PROGRESS NOTES
Bedside shift change report given to SARAN Vann (oncoming nurse) by Stacey Pimentel RN (offgoing nurse). Report included the following information SBAR, Kardex, Intake/Output and MAR.

## 2017-03-05 VITALS
DIASTOLIC BLOOD PRESSURE: 78 MMHG | HEIGHT: 64 IN | SYSTOLIC BLOOD PRESSURE: 111 MMHG | OXYGEN SATURATION: 97 % | RESPIRATION RATE: 18 BRPM | BODY MASS INDEX: 41.66 KG/M2 | HEART RATE: 67 BPM | WEIGHT: 244 LBS | TEMPERATURE: 98.4 F

## 2017-03-05 LAB
ALBUMIN SERPL BCP-MCNC: 2.8 G/DL (ref 3.5–5)
ALBUMIN/GLOB SERPL: 0.8 {RATIO} (ref 1.1–2.2)
ALP SERPL-CCNC: 186 U/L (ref 45–117)
ALT SERPL-CCNC: 182 U/L (ref 12–78)
ANION GAP BLD CALC-SCNC: 6 MMOL/L (ref 5–15)
AST SERPL W P-5'-P-CCNC: 188 U/L (ref 15–37)
BASOPHILS # BLD AUTO: 0 K/UL (ref 0–0.1)
BASOPHILS # BLD: 0 % (ref 0–1)
BILIRUB SERPL-MCNC: 0.3 MG/DL (ref 0.2–1)
BUN SERPL-MCNC: 11 MG/DL (ref 6–20)
BUN/CREAT SERPL: 14 (ref 12–20)
CALCIUM SERPL-MCNC: 8.5 MG/DL (ref 8.5–10.1)
CHLORIDE SERPL-SCNC: 102 MMOL/L (ref 97–108)
CO2 SERPL-SCNC: 29 MMOL/L (ref 21–32)
CREAT SERPL-MCNC: 0.8 MG/DL (ref 0.55–1.02)
EOSINOPHIL # BLD: 0.1 K/UL (ref 0–0.4)
EOSINOPHIL NFR BLD: 2 % (ref 0–7)
ERYTHROCYTE [DISTWIDTH] IN BLOOD BY AUTOMATED COUNT: 13.1 % (ref 11.5–14.5)
GLOBULIN SER CALC-MCNC: 3.6 G/DL (ref 2–4)
GLUCOSE SERPL-MCNC: 188 MG/DL (ref 65–100)
HCT VFR BLD AUTO: 34.6 % (ref 35–47)
HGB BLD-MCNC: 11.2 G/DL (ref 11.5–16)
LYMPHOCYTES # BLD AUTO: 40 % (ref 12–49)
LYMPHOCYTES # BLD: 2.3 K/UL (ref 0.8–3.5)
MCH RBC QN AUTO: 31.5 PG (ref 26–34)
MCHC RBC AUTO-ENTMCNC: 32.4 G/DL (ref 30–36.5)
MCV RBC AUTO: 97.2 FL (ref 80–99)
MONOCYTES # BLD: 0.5 K/UL (ref 0–1)
MONOCYTES NFR BLD AUTO: 9 % (ref 5–13)
NEUTS SEG # BLD: 2.8 K/UL (ref 1.8–8)
NEUTS SEG NFR BLD AUTO: 49 % (ref 32–75)
PLATELET # BLD AUTO: 193 K/UL (ref 150–400)
POTASSIUM SERPL-SCNC: 3.7 MMOL/L (ref 3.5–5.1)
PROT SERPL-MCNC: 6.4 G/DL (ref 6.4–8.2)
RBC # BLD AUTO: 3.56 M/UL (ref 3.8–5.2)
SODIUM SERPL-SCNC: 137 MMOL/L (ref 136–145)
WBC # BLD AUTO: 5.8 K/UL (ref 3.6–11)

## 2017-03-05 PROCEDURE — 80053 COMPREHEN METABOLIC PANEL: CPT | Performed by: SURGERY

## 2017-03-05 PROCEDURE — 74011250637 HC RX REV CODE- 250/637: Performed by: NURSE PRACTITIONER

## 2017-03-05 PROCEDURE — 36415 COLL VENOUS BLD VENIPUNCTURE: CPT | Performed by: SURGERY

## 2017-03-05 PROCEDURE — 74011250636 HC RX REV CODE- 250/636: Performed by: SURGERY

## 2017-03-05 PROCEDURE — 85025 COMPLETE CBC W/AUTO DIFF WBC: CPT | Performed by: SURGERY

## 2017-03-05 PROCEDURE — 99218 HC RM OBSERVATION: CPT

## 2017-03-05 PROCEDURE — 74011250637 HC RX REV CODE- 250/637: Performed by: SURGERY

## 2017-03-05 RX ORDER — CHLORHEXIDINE GLUCONATE 1.2 MG/ML
15 RINSE ORAL 2 TIMES DAILY
Qty: 420 ML | Refills: 0 | Status: SHIPPED | OUTPATIENT
Start: 2017-03-05 | End: 2017-03-07 | Stop reason: SDUPTHER

## 2017-03-05 RX ADMIN — POTASSIUM CHLORIDE 10 MEQ: 750 TABLET, FILM COATED, EXTENDED RELEASE ORAL at 10:06

## 2017-03-05 RX ADMIN — ENOXAPARIN SODIUM 40 MG: 40 INJECTION SUBCUTANEOUS at 10:06

## 2017-03-05 RX ADMIN — Medication 5 ML: at 06:00

## 2017-03-05 RX ADMIN — OXYCODONE HYDROCHLORIDE AND ACETAMINOPHEN 1 TABLET: 5; 325 TABLET ORAL at 12:43

## 2017-03-05 RX ADMIN — BENZTROPINE MESYLATE 0.5 MG: 1 TABLET ORAL at 12:43

## 2017-03-05 RX ADMIN — DOCUSATE SODIUM AND SENNOSIDES 1 TABLET: 8.6; 5 TABLET, FILM COATED ORAL at 10:03

## 2017-03-05 RX ADMIN — ESCITALOPRAM OXALATE 20 MG: 10 TABLET ORAL at 10:03

## 2017-03-05 NOTE — PROGRESS NOTES
Pt discharged home. Discharge instructions and prescriptions given and explained to patient. Pt verbalized understanding. Peripheral IV discontinued, tip intact, pt tolerated well. Pt taken off floor via wheelchair. Pt's  to transport pt home.

## 2017-03-05 NOTE — PROGRESS NOTES
Bedside shift change report given to Ranjeet Lala (oncoming nurse) by Ivette Godinez RN (offgoing nurse). Report included the following information SBAR, Kardex, Intake/Output and MAR.

## 2017-03-05 NOTE — ROUTINE PROCESS
Bedside and Verbal shift change report given to Corinne RN (oncoming nurse) by Maggie Jimenez (offgoing nurse). Report included the following information SBAR, Kardex, Intake/Output, MAR, Accordion and Recent Results.

## 2017-03-05 NOTE — PROGRESS NOTES
Subjective   Tolerating regular diet without Nausea    Temp:  [97.3 °F (36.3 °C)-98.7 °F (37.1 °C)]   Pulse (Heart Rate):  [62-87]   BP: ()/(65-87)   Resp Rate:  [16-26]   O2 Sat (%):  [95 %-100 %]   Weight:  [244 lb (110.7 kg)]      03/03 1901 - 03/05 0700  In: 720 [P.O.:720]  Out: 3400 [Urine:3400]      Objective  Gen- Alert in NAD  Lungs- CTA  H_RRR  Abd- S/nt/ND      Recent Results (from the past 24 hour(s))   GLUCOSE, POC    Collection Time: 03/04/17 11:35 PM   Result Value Ref Range    Glucose (POC) 135 (H) 65 - 100 mg/dL    Performed by GUILLE DAVALOS    CBC WITH AUTOMATED DIFF    Collection Time: 03/05/17 10:30 AM   Result Value Ref Range    WBC 5.8 3.6 - 11.0 K/uL    RBC 3.56 (L) 3.80 - 5.20 M/uL    HGB 11.2 (L) 11.5 - 16.0 g/dL    HCT 34.6 (L) 35.0 - 47.0 %    MCV 97.2 80.0 - 99.0 FL    MCH 31.5 26.0 - 34.0 PG    MCHC 32.4 30.0 - 36.5 g/dL    RDW 13.1 11.5 - 14.5 %    PLATELET 776 449 - 631 K/uL    NEUTROPHILS 49 32 - 75 %    LYMPHOCYTES 40 12 - 49 %    MONOCYTES 9 5 - 13 %    EOSINOPHILS 2 0 - 7 %    BASOPHILS 0 0 - 1 %    ABS. NEUTROPHILS 2.8 1.8 - 8.0 K/UL    ABS. LYMPHOCYTES 2.3 0.8 - 3.5 K/UL    ABS. MONOCYTES 0.5 0.0 - 1.0 K/UL    ABS. EOSINOPHILS 0.1 0.0 - 0.4 K/UL    ABS. BASOPHILS 0.0 0.0 - 0.1 K/UL   METABOLIC PANEL, COMPREHENSIVE    Collection Time: 03/05/17 10:30 AM   Result Value Ref Range    Sodium 137 136 - 145 mmol/L    Potassium 3.7 3.5 - 5.1 mmol/L    Chloride 102 97 - 108 mmol/L    CO2 29 21 - 32 mmol/L    Anion gap 6 5 - 15 mmol/L    Glucose 188 (H) 65 - 100 mg/dL    BUN 11 6 - 20 MG/DL    Creatinine 0.80 0.55 - 1.02 MG/DL    BUN/Creatinine ratio 14 12 - 20      GFR est AA >60 >60 ml/min/1.73m2    GFR est non-AA >60 >60 ml/min/1.73m2    Calcium 8.5 8.5 - 10.1 MG/DL    Bilirubin, total 0.3 0.2 - 1.0 MG/DL    ALT (SGPT) 182 (H) 12 - 78 U/L    AST (SGOT) 188 (H) 15 - 37 U/L    Alk.  phosphatase 186 (H) 45 - 117 U/L    Protein, total 6.4 6.4 - 8.2 g/dL    Albumin 2.8 (L) 3.5 - 5.0 g/dL Globulin 3.6 2.0 - 4.0 g/dL    A-G Ratio 0.8 (L) 1.1 - 2.2         Active Problems:    * No active hospital problems.  *        Assessment & Plan  S/p Lap con  Lft's stable  Will D/c home

## 2017-03-09 RX ORDER — CHLORHEXIDINE GLUCONATE 1.2 MG/ML
RINSE ORAL
Qty: 473 ML | Refills: 0 | Status: SHIPPED | OUTPATIENT
Start: 2017-03-09 | End: 2017-10-25

## 2017-03-20 ENCOUNTER — OFFICE VISIT (OUTPATIENT)
Dept: SURGERY | Age: 57
End: 2017-03-20

## 2017-03-20 VITALS
WEIGHT: 248 LBS | RESPIRATION RATE: 16 BRPM | HEART RATE: 68 BPM | HEIGHT: 64 IN | TEMPERATURE: 98.3 F | DIASTOLIC BLOOD PRESSURE: 78 MMHG | SYSTOLIC BLOOD PRESSURE: 110 MMHG | OXYGEN SATURATION: 98 % | BODY MASS INDEX: 42.34 KG/M2

## 2017-03-20 DIAGNOSIS — Z09 POSTOPERATIVE EXAMINATION: Primary | ICD-10-CM

## 2017-03-20 NOTE — PROGRESS NOTES
Subjective:      Daria Osborne is a 49 y.o. female presents for postop care 17 days following laparoscopic cholecystectomy by Dr. Mary Robin. Appetite is good. Eating a regular diet. without difficulty. Bowel movements are alternating constipation and regularity. Not drinking much water. The patient is not having any pain. .Denies fever, nausea, shortness of breath, chest pain, redness at incision site, vomiting and diarrhea    Pathology:  Chronic cholecystitis with cholelithiasis    Advance directive not on file      Objective:     Visit Vitals    /78 (BP 1 Location: Right arm, BP Patient Position: Sitting)    Pulse 68    Temp 98.3 °F (36.8 °C) (Oral)    Resp 16    Ht 5' 4\" (1.626 m)    Wt 248 lb (112.5 kg)    LMP 02/05/2017    SpO2 98%    BMI 42.57 kg/m2       General:  alert, no distress   Abdomen: soft, bowel sounds active, non-tender   Incision:   healing well, no drainage, no erythema, no seroma, no swelling, no dehiscence, incisions well approximated   Heart: regular rate and rhythm, S1, S2 normal, no murmur, click, rub or gallop   Lungs: clear to auscultation bilaterally     Assessment:     1. Chronic cholecystitis with cholelithiasis, status post lap con. Doing well postoperatively. Plan:     1. Pt is to increase activities as tolerated. 2. Follow-up: PRN  3. Constipation - drink 8-10 glasses of water a day and add colace if still needed. Ms. Brook Nicholson has a reminder for a \"due or due soon\" health maintenance. I have asked that she contact her primary care provider for follow-up on this health maintenance. Patient verbalized understanding and agreement.

## 2017-03-20 NOTE — MR AVS SNAPSHOT
Visit Information Date & Time Provider Department Dept. Phone Encounter #  
 3/20/2017 11:00 AM Beny Vinson NP [phone] [id number] Upcoming Health Maintenance Date Due Hepatitis C Screening 1960 DTaP/Tdap/Td series (1 - Tdap) 5/26/1983 PAP AKA CERVICAL CYTOLOGY 5/26/1983 BREAST CANCER SCRN MAMMOGRAM 5/26/2012 FOBT Q 1 YEAR AGE 50-75 5/26/2012 INFLUENZA AGE 9 TO ADULT 8/1/2016 Allergies as of 3/20/2017  Review Complete On: 3/20/2017 By: Beny Vinson NP Severity Noted Reaction Type Reactions Penicillins Medium 04/15/2010   Systemic Rash Current Immunizations  Never Reviewed No immunizations on file. Not reviewed this visit You Were Diagnosed With   
  
 Codes Comments Postoperative examination    -  Primary ICD-10-CM: A32 ICD-9-CM: V67.00 Vitals BP Pulse Temp Resp Height(growth percentile) Weight(growth percentile) 110/78 (BP 1 Location: Right arm, BP Patient Position: Sitting) 68 98.3 °F (36.8 °C) (Oral) 16 5' 4\" (1.626 m) 248 lb (112.5 kg) LMP SpO2 BMI OB Status Smoking Status 02/05/2017 98% 42.57 kg/m2 Having regular periods Never Smoker BMI and BSA Data Body Mass Index Body Surface Area 42.57 kg/m 2 2.25 m 2 Preferred Pharmacy Pharmacy Name Phone [place] [phone] [facility], [address] [place] [address] [phone] Your Updated Medication List  
  
   
This list is accurate as of: 3/20/17 12:02 PM.  Always use your most recent med list.  
  
  
  
  
 benztropine 0.5 mg tablet Commonly known as:  COGENTIN Take 0.5 mg by mouth two (2) times a day. chlorhexidine 0.12 % solution Commonly known as:  PERIDEX  
SWISH AND SPIT USING 5 ML BY MOUTH THREE TIMES DAILY FOR 10 DAYS  
  
 divalproex  mg ER tablet Commonly known as:  DEPAKOTE ER Take 500 mg by mouth nightly. escitalopram oxalate 20 mg tablet Commonly known as:  Gradie Kil Take 20 mg by mouth daily. haloperidol 10 mg tablet Commonly known as:  HALDOL Take 10 mg by mouth nightly.  
  
 haloperidol decanoate 100 mg/mL injection Commonly known as:  HALDOL DECANOATE  
200 mg by IntraMUSCular route every twenty-eight (28) days. lovastatin 40 mg tablet Commonly known as:  MEVACOR Take 40 mg by mouth nightly. oxyCODONE-acetaminophen 5-325 mg per tablet Commonly known as:  PERCOCET Take 1-2 Tabs by mouth every four (4) hours as needed for Pain. Max Daily Amount: 12 Tabs. POTASSIUM CHLORIDE SR 10 MEQ TAB 10 mEq daily. ziprasidone 80 mg capsule Commonly known as:  Emily Hash Take 80 mg by mouth nightly. Patient Instructions Cholecystectomy: What to Expect at [facility] Your Recovery After your surgery, it is normal to feel weak and tired for several days after you return home. Your belly may be swollen. If you had laparoscopic surgery, you may also have pain in your shoulder for about 24 hours. You may have gas or need to burp a lot at first, and a few people get diarrhea. The diarrhea usually goes away in 2 to 4 weeks, but it may last longer. How quickly you recover depends on whether you had a laparoscopic or open surgery. · For a laparoscopic surgery, most people can go back to work or their normal routine in 1 to 2 weeks, but it may take longer, depending on the type of work you do. · For an open surgery, it will probably take 4 to 6 weeks before you get back to your normal routine. This care sheet gives you a general idea about how long it will take for you to recover. However, each person recovers at a different pace. Follow the steps below to get better as quickly as possible. How can you care for yourself at home? Activity · Rest when you feel tired. Getting enough sleep will help you recover. · Try to walk each day. Start out by walking a little more than you did the day before. Gradually increase the amount you walk. Walking boosts blood flow and helps prevent pneumonia and constipation. · For about 2 to 4 weeks, avoid lifting anything that would make you strain. This may include a child, heavy grocery bags and milk containers, a heavy briefcase or backpack, cat litter or dog food bags, or a vacuum . · Avoid strenuous activities, such as biking, jogging, weightlifting, and aerobic exercise, until your doctor says it is okay. · You may shower 24 to 48 hours after surgery, if your doctor okays it. Pat the cut (incision) dry. Do not take a bath for the first 2 weeks, or until your doctor tells you it is okay. · You may drive when you are no longer taking pain medicine and can quickly move your foot from the gas pedal to the brake. You must also be able to sit comfortably for a long period of time, even if you do not plan to go far. You might get caught in traffic. · For a laparoscopic surgery, most people can go back to work or their normal routine in 1 to 2 weeks, but it may take longer. For an open surgery, it will probably take 4 to 6 weeks before you get back to your normal routine. · Your doctor will tell you when you can have sex again. Diet · Eat smaller meals more often instead of fewer larger meals. You can eat a normal diet, but avoid eating fatty foods for about 1 month. Fatty foods include hamburger, whole milk, cheese, and many snack foods. If your stomach is upset, try bland, low-fat foods like plain rice, broiled chicken, toast, and yogurt. · Drink plenty of fluids (unless your doctor tells you not to). · If you have diarrhea, try avoiding spicy foods, dairy products, fatty foods, and alcohol. You can also watch to see if specific foods cause it, and stop eating them. If the diarrhea continues for more than 2 weeks, talk to your doctor. · You may notice that your bowel movements are not regular right after your surgery. This is common. Try to avoid constipation and straining with bowel movements. You may want to take a fiber supplement every day. If you have not had a bowel movement after a couple of days, ask your doctor about taking a mild laxative. Medicines · Your doctor will tell you if and when you can restart your medicines. He or she will also give you instructions about taking any new medicines. · If you take blood thinners, such as warfarin (Coumadin), clopidogrel (Plavix), or aspirin, be sure to talk to your doctor. He or she will tell you if and when to start taking those medicines again. Make sure that you understand exactly what your doctor wants you to do. · Take pain medicines exactly as directed. ¨ If the doctor gave you a prescription medicine for pain, take it as prescribed. ¨ If you are not taking a prescription pain medicine, take an over-the-counter medicine such as acetaminophen (Tylenol), ibuprofen (Advil, Motrin), or naproxen (Aleve). Read and follow all instructions on the label. ¨ Do not take two or more pain medicines at the same time unless the doctor told you to. Many pain medicines contain acetaminophen, which is Tylenol. Too much Tylenol can be harmful. · If you think your pain medicine is making you sick to your stomach: 
¨ Take your medicine after meals (unless your doctor tells you not to). ¨ Ask your doctor for a different pain medicine. · If your doctor prescribed antibiotics, take them as directed. Do not stop taking them just because you feel better. You need to take the full course of antibiotics. Incision care · If you have strips of tape on the incision, or cut, leave the tape on for a week or until it falls off. · After 24 to 48 hours, wash the area daily with warm, soapy water, and pat it dry. · You may have staples to hold the cut together.  Keep them dry until your doctor takes them out. This is usually in 7 to 10 days. · Keep the area clean and dry. You may cover it with a gauze bandage if it weeps or rubs against clothing. Change the bandage every day. Ice · To reduce swelling and pain, put ice or a cold pack on your belly for 10 to 20 minutes at a time. Do this every 1 to 2 hours. Put a thin cloth between the ice and your skin. Follow-up care is a key part of your treatment and safety. Be sure to make and go to all appointments, and call your doctor if you are having problems. It's also a good idea to know your test results and keep a list of the medicines you take. When should you call for help? Call 911 anytime you think you may need emergency care. For example, call if: 
· You passed out (lost consciousness). · You have severe trouble breathing. · You have sudden chest pain and shortness of breath, or you cough up blood. Call your doctor now or seek immediate medical care if: 
· You are sick to your stomach and cannot drink fluids. · You have pain that does not get better when you take your pain medicine. · You have signs of infection, such as: 
¨ Increased pain, swelling, warmth, or redness. ¨ Red streaks leading from the incision. ¨ Pus draining from the incision. ¨ Swollen lymph nodes in your neck, armpits, or groin. ¨ A fever. · Your urine turns dark brown or your stool is light-colored or dominick-colored. · Your skin or the whites of your eyes turn yellow. · Bright red blood has soaked through a large bandage over your incision. · You have signs of a blood clot, such as: 
¨ Pain in your calf, back of knee, thigh, or groin. ¨ Redness and swelling in your leg or groin. · You have trouble passing urine or stool, especially if you have mild pain or swelling in your lower belly. Watch closely for any changes in your health, and be sure to contact your doctor if: 
· You had a laparoscopic surgery and your shoulder pain lasts more than 24 hours. · You do not have a bowel movement after taking a laxative. Where can you learn more? Go to http://sterling-ishaan.info/. Enter 450 60 [id number] in the search box to learn more about \"Cholecystectomy: What to Expect at Home. \" Current as of: August 9, 2016 Content Version: 11.1 © 5267-5324 [employer]. Care instructions adapted under license by [employer] (which disclaims liability or warranty for this information). If you have questions about a medical condition or this instruction, always ask your healthcare professional. [address] any warranty or liability for your use of this information. Introducing [facility]! Kenyetta Gillette introduces [employer] patient portal. Now you can access parts of your medical record, email your doctor's office, and request medication refills online. 1. In your internet browser, go to [email or web address] 2. Click on the First Time User? Click Here link in the Sign In box. You will see the New Member Sign Up page. 3. Enter your [employer] Access Code exactly as it appears below. You will not need to use this code after youve completed the sign-up process. If you do not sign up before the expiration date, you must request a new code. · [employer] Access Code: [email or web address] Expires: 5/10/2017 12:25 PM 
 
4. Enter the last four digits of your Social Security Number (xxxx) and Date of Birth (mm/dd/yyyy) as indicated and click Submit. You will be taken to the next sign-up page. 5. Create a [employer] ID. This will be your [employer] login ID and cannot be changed, so think of one that is secure and easy to remember. 6. Create a [employer] password. You can change your password at any time. 7. Enter your Password Reset Question and Answer. This can be used at a later time if you forget your password. 8. Enter your e-mail address.  You will receive e-mail notification when new information is available in [employer]. 9. Click Sign Up. You can now view and download portions of your medical record. 10. Click the Download Summary menu link to download a portable copy of your medical information. If you have questions, please visit the Frequently Asked Questions section of the [employer] website. Remember, [employer] is NOT to be used for urgent needs. For medical emergencies, dial 911. Now available from your iPhone and Android! Please provide this summary of care documentation to your next provider. Your primary care clinician is listed as Tiffany Rascon. If you have any questions after today's visit, please call [phone].

## 2017-03-20 NOTE — PATIENT INSTRUCTIONS
Cholecystectomy: What to Expect at [address] [place] [address]  After your surgery, it is normal to feel weak and tired for several days after you return home. Your belly may be swollen. If you had laparoscopic surgery, you may also have pain in your shoulder for about 24 hours. You may have gas or need to burp a lot at first, and a few people get diarrhea. The diarrhea usually goes away in 2 to 4 weeks, but it may last longer. How quickly you recover depends on whether you had a laparoscopic or open surgery. · For a laparoscopic surgery, most people can go back to work or their normal routine in 1 to 2 weeks, but it may take longer, depending on the type of work you do. · For an open surgery, it will probably take 4 to 6 weeks before you get back to your normal routine. This care sheet gives you a general idea about how long it will take for you to recover. However, each person recovers at a different pace. Follow the steps below to get better as quickly as possible. How can you care for yourself at home? Activity  · Rest when you feel tired. Getting enough sleep will help you recover. · Try to walk each day. Start out by walking a little more than you did the day before. Gradually increase the amount you walk. Walking boosts blood flow and helps prevent pneumonia and constipation. · For about 2 to 4 weeks, avoid lifting anything that would make you strain. This may include a child, heavy grocery bags and milk containers, a heavy briefcase or backpack, cat litter or dog food bags, or a vacuum . · Avoid strenuous activities, such as biking, jogging, weightlifting, and aerobic exercise, until your doctor says it is okay. · You may shower 24 to 48 hours after surgery, if your doctor okays it. Pat the cut (incision) dry. Do not take a bath for the first 2 weeks, or until your doctor tells you it is okay.   · You may drive when you are no longer taking pain medicine and can quickly move your foot from the gas pedal to the brake. You must also be able to sit comfortably for a long period of time, even if you do not plan to go far. You might get caught in traffic. · For a laparoscopic surgery, most people can go back to work or their normal routine in 1 to 2 weeks, but it may take longer. For an open surgery, it will probably take 4 to 6 weeks before you get back to your normal routine. · Your doctor will tell you when you can have sex again. Diet  · Eat smaller meals more often instead of fewer larger meals. You can eat a normal diet, but avoid eating fatty foods for about 1 month. Fatty foods include hamburger, whole milk, cheese, and many snack foods. If your stomach is upset, try bland, low-fat foods like plain rice, broiled chicken, toast, and yogurt. · Drink plenty of fluids (unless your doctor tells you not to). · If you have diarrhea, try avoiding spicy foods, dairy products, fatty foods, and alcohol. You can also watch to see if specific foods cause it, and stop eating them. If the diarrhea continues for more than 2 weeks, talk to your doctor. · You may notice that your bowel movements are not regular right after your surgery. This is common. Try to avoid constipation and straining with bowel movements. You may want to take a fiber supplement every day. If you have not had a bowel movement after a couple of days, ask your doctor about taking a mild laxative. Medicines  · Your doctor will tell you if and when you can restart your medicines. He or she will also give you instructions about taking any new medicines. · If you take blood thinners, such as warfarin (Coumadin), clopidogrel (Plavix), or aspirin, be sure to talk to your doctor. He or she will tell you if and when to start taking those medicines again. Make sure that you understand exactly what your doctor wants you to do. · Take pain medicines exactly as directed.   ¨ If the doctor gave you a prescription medicine for pain, take it as prescribed. ¨ If you are not taking a prescription pain medicine, take an over-the-counter medicine such as acetaminophen (Tylenol), ibuprofen (Advil, Motrin), or naproxen (Aleve). Read and follow all instructions on the label. ¨ Do not take two or more pain medicines at the same time unless the doctor told you to. Many pain medicines contain acetaminophen, which is Tylenol. Too much Tylenol can be harmful. · If you think your pain medicine is making you sick to your stomach:  ¨ Take your medicine after meals (unless your doctor tells you not to). ¨ Ask your doctor for a different pain medicine. · If your doctor prescribed antibiotics, take them as directed. Do not stop taking them just because you feel better. You need to take the full course of antibiotics. Incision care  · If you have strips of tape on the incision, or cut, leave the tape on for a week or until it falls off. · After 24 to 48 hours, wash the area daily with warm, soapy water, and pat it dry. · You may have staples to hold the cut together. Keep them dry until your doctor takes them out. This is usually in 7 to 10 days. · Keep the area clean and dry. You may cover it with a gauze bandage if it weeps or rubs against clothing. Change the bandage every day. Ice  · To reduce swelling and pain, put ice or a cold pack on your belly for 10 to 20 minutes at a time. Do this every 1 to 2 hours. Put a thin cloth between the ice and your skin. Follow-up care is a key part of your treatment and safety. Be sure to make and go to all appointments, and call your doctor if you are having problems. It's also a good idea to know your test results and keep a list of the medicines you take. When should you call for help? Call 911 anytime you think you may need emergency care. For example, call if:  · You passed out (lost consciousness). · You have severe trouble breathing. · You have sudden chest pain and shortness of breath, or you cough up blood.   Call your doctor now or seek immediate medical care if:  · You are sick to your stomach and cannot drink fluids. · You have pain that does not get better when you take your pain medicine. · You have signs of infection, such as:  ¨ Increased pain, swelling, warmth, or redness. ¨ Red streaks leading from the incision. ¨ Pus draining from the incision. ¨ Swollen lymph nodes in your neck, armpits, or groin. ¨ A fever. · Your urine turns dark brown or your stool is light-colored or dominick-colored. · Your skin or the whites of your eyes turn yellow. · Bright red blood has soaked through a large bandage over your incision. · You have signs of a blood clot, such as:  ¨ Pain in your calf, back of knee, thigh, or groin. ¨ Redness and swelling in your leg or groin. · You have trouble passing urine or stool, especially if you have mild pain or swelling in your lower belly. Watch closely for any changes in your health, and be sure to contact your doctor if:  · You had a laparoscopic surgery and your shoulder pain lasts more than 24 hours. · You do not have a bowel movement after taking a laxative. Where can you learn more? Go to http://sterling-ishaan.info/. Enter 246 04 306 in the search box to learn more about \"Cholecystectomy: What to Expect at Home. \"  Current as of: August 9, 2016  Content Version: 11.1  © 2428-5787 [employer]. Care instructions adapted under license by [employer] (which disclaims liability or warranty for this information). If you have questions about a medical condition or this instruction, always ask your healthcare professional. [place] [address] any warranty or liability for your use of this information.

## 2017-03-20 NOTE — PROGRESS NOTES
1. Have you been to the ER, urgent care clinic since your last visit? Hospitalized since your last visit? No    2. Have you seen or consulted any other health care providers outside of the [facility] since your last visit? Include any pap smears or colon screening.  No

## 2017-04-28 ENCOUNTER — APPOINTMENT (OUTPATIENT)
Dept: GENERAL RADIOLOGY | Age: 57
End: 2017-04-28
Attending: EMERGENCY MEDICINE
Payer: MEDICARE

## 2017-04-28 ENCOUNTER — HOSPITAL ENCOUNTER (EMERGENCY)
Age: 57
Discharge: HOME | End: 2017-04-28
Attending: STUDENT IN AN ORGANIZED HEALTH CARE EDUCATION/TRAINING PROGRAM
Payer: MEDICARE

## 2017-04-28 VITALS
SYSTOLIC BLOOD PRESSURE: 128 MMHG | HEART RATE: 72 BPM | RESPIRATION RATE: 16 BRPM | HEIGHT: 64 IN | OXYGEN SATURATION: 96 % | WEIGHT: 243.4 LBS | TEMPERATURE: 98.4 F | DIASTOLIC BLOOD PRESSURE: 78 MMHG | BODY MASS INDEX: 41.55 KG/M2

## 2017-04-28 DIAGNOSIS — G89.29 CHRONIC MIDLINE THORACIC BACK PAIN: Primary | ICD-10-CM

## 2017-04-28 DIAGNOSIS — M54.6 CHRONIC MIDLINE THORACIC BACK PAIN: Primary | ICD-10-CM

## 2017-04-28 PROCEDURE — 99283 EMERGENCY DEPT VISIT LOW MDM: CPT

## 2017-04-28 PROCEDURE — 71020 XR CHEST PA LAT: CPT

## 2017-04-28 PROCEDURE — 74011250637 HC RX REV CODE- 250/637: Performed by: EMERGENCY MEDICINE

## 2017-04-28 RX ORDER — TRAMADOL HYDROCHLORIDE 50 MG/1
50 TABLET ORAL
Qty: 20 TAB | Refills: 0 | Status: SHIPPED | OUTPATIENT
Start: 2017-04-28 | End: 2017-10-25

## 2017-04-28 RX ORDER — ONDANSETRON 4 MG/1
4 TABLET, ORALLY DISINTEGRATING ORAL
Status: COMPLETED | OUTPATIENT
Start: 2017-04-28 | End: 2017-04-28

## 2017-04-28 RX ORDER — OXYCODONE AND ACETAMINOPHEN 5; 325 MG/1; MG/1
1 TABLET ORAL
Status: COMPLETED | OUTPATIENT
Start: 2017-04-28 | End: 2017-04-28

## 2017-04-28 RX ADMIN — OXYCODONE HYDROCHLORIDE AND ACETAMINOPHEN 1 TABLET: 5; 325 TABLET ORAL at 14:10

## 2017-04-28 RX ADMIN — ONDANSETRON 4 MG: 4 TABLET, ORALLY DISINTEGRATING ORAL at 14:09

## 2017-04-28 NOTE — DISCHARGE INSTRUCTIONS
We hope that we have addressed all of your medical concerns. The examination and treatment you received in the Emergency Department were for an emergent problem and were not intended as complete care. It is important that you follow up with your healthcare provider(s) for ongoing care. If your symptoms worsen or do not improve as expected, and you are unable to reach your usual health care provider(s), you should return to the Emergency Department. Today's healthcare is undergoing tremendous change, and patient satisfaction surveys are one of the many tools to assess the quality of medical care. You may receive a survey from the [employer] regarding your experience in the Emergency Department. I hope that your experience has been completely positive, particularly the medical care that I provided. As such, please participate in the survey; anything less than excellent does not meet my expectations or intentions. [address] [place] [address] and [address] [place] [address] participate in nationally recognized quality of care measures. If your blood pressure is greater than 120/80, as reported below, we urge that you seek medical care to address the potential of high blood pressure, commonly known as hypertension. Hypertension can be hereditary or can be caused by certain medical conditions, pain, stress, or \"white coat syndrome. \"       Please make an appointment with your health care provider(s) for follow up of your Emergency Department visit. VITALS:   Patient Vitals for the past 8 hrs:   Temp Pulse Resp BP SpO2   04/28/17 1233 98.1 °F (36.7 °C) 60 16 123/83 100 %          Thank you for allowing us to provide you with medical care today. We realize that you have many choices for your emergency care needs. Please choose us in the future for any continued health care needs.       Calvin Bay NP    Critical [facility].   Office: [phone]            No results found for this or any previous visit (from the past 24 hour(s)). Xr Chest Pa Lat    Result Date: 4/28/2017  EXAM:  XR CHEST PA LAT INDICATION:  Back pain since this morning. Cough. COMPARISON: 2/24/2017 TECHNIQUE: PA and lateral chest views FINDINGS: The cardiomediastinal contours are stable. The pulmonary vasculature is within normal limits. The lungs and pleural spaces are clear. The bones and upper abdomen are stable. IMPRESSION: No acute process. Stable exam.         Back Pain, Emergency or Urgent Symptoms: Care Instructions  Your Care Instructions  Many people have back pain at one time or another. In most cases, pain gets better with self-care that includes over-the-counter pain medicine, ice, heat, and exercises. Unless you have symptoms of a severe injury or heart attack, you may be able to give yourself a few days before you call a doctor. But some back problems are very serious. Do not ignore symptoms that need to be checked right away. Follow-up care is a key part of your treatment and safety. Be sure to make and go to all appointments, and call your doctor if you are having problems. It's also a good idea to know your test results and keep a list of the medicines you take. How can you care for yourself at home? · Sit or lie in positions that are most comfortable and that reduce your pain. Try one of these positions when you lie down:  ¨ Lie on your back with your knees bent and supported by large pillows. ¨ Lie on the floor with your legs on the seat of a sofa or chair. Rebecca Gunning on your side with your knees and hips bent and a pillow between your legs. ¨ Lie on your stomach if it does not make pain worse. · Do not sit up in bed, and avoid soft couches and twisted positions. Bed rest can help relieve pain at first, but it delays healing. Avoid bed rest after the first day. · Change positions every 30 minutes.  If you must sit for long periods of time, take breaks from sitting. Get up and walk around, or lie flat. · Try using a heating pad on a low or medium setting, for 15 to 20 minutes every 2 or 3 hours. Try a warm shower in place of one session with the heating pad. You can also buy single-use heat wraps that last up to 8 hours. You can also try ice or cold packs on your back for 10 to 20 minutes at a time, several times a day. (Put a thin cloth between the ice pack and your skin.) This reduces pain and makes it easier to be active and exercise. · Take pain medicines exactly as directed. ¨ If the doctor gave you a prescription medicine for pain, take it as prescribed. ¨ If you are not taking a prescription pain medicine, ask your doctor if you can take an over-the-counter medicine. When should you call for help? Call 911 anytime you think you may need emergency care. For example, call if:  · You are unable to move a leg at all. · You have back pain with severe belly pain. · You have symptoms of a heart attack. These may include:  ¨ Chest pain or pressure, or a strange feeling in the chest.  ¨ Sweating. ¨ Shortness of breath. ¨ Nausea or vomiting. ¨ Pain, pressure, or a strange feeling in the back, neck, jaw, or upper belly or in one or both shoulders or arms. ¨ Lightheadedness or sudden weakness. ¨ A fast or irregular heartbeat. After you call 911, the  may tell you to chew 1 adult-strength or 2 to 4 low-dose aspirin. Wait for an ambulance. Do not try to drive yourself. Call your doctor now or seek immediate medical care if:  · You have new or worse symptoms in your arms, legs, chest, belly, or buttocks. Symptoms may include:  ¨ Numbness or tingling. ¨ Weakness. ¨ Pain. · You lose bladder or bowel control. · You have back pain and:  ¨ You have injured your back while lifting or doing some other activity. Call if the pain is severe, has not gone away after 1 or 2 days, and you cannot do your normal daily activities.   ¨ You have had a back injury before that needed treatment. ¨ Your pain has lasted longer than 4 weeks. ¨ You have had weight loss you cannot explain. ¨ You are age 48 or older. ¨ You have cancer now or have had it before. Watch closely for changes in your health, and be sure to contact your doctor if you are not getting better as expected. Where can you learn more? Go to http://sterling-ishaan.info/. Enter D583 in the search box to learn more about \"Back Pain, Emergency or Urgent Symptoms: Care Instructions. \"  Current as of: May 27, 2016  Content Version: 11.2  © 3085-1109 [employer]. Care instructions adapted under license by "[employer] (which disclaims liability or warranty for this information). If you have questions about a medical condition or this instruction, always ask your healthcare professional. [address] any warranty or liability for your use of this information.

## 2017-04-28 NOTE — ED TRIAGE NOTES
TRIAGE NOTE: Patient arrives via EMS for lower back pain that began this morning. No known injury. Was taking left over oxycodone from gallbladder surgery 1 month ago for aches and pains but was only given 40 pills and mediation has run out. Patient is very poor historian.

## 2017-04-28 NOTE — ED PROVIDER NOTES
Patient is a 49 y.o. female presenting with back pain. Back Pain    Pertinent negatives include no headaches, no abdominal pain and no dysuria. Pt states that she has had mid back pain for several days that worsens with coughing. Denies any recent fall or direct injury. Denies any fever, difficulty breathing, difficulty swallowing, SOB or chest pain. Denies any nausea, vomiting or diarrhea. Denies cold symptoms, headache, neck pain, visual changes, focal weakness or rash. Pt. Reports that she has not had any pain medications today prior to arrival.  Old charts reviewed. Past Medical History:   Diagnosis Date    Diabetes ([facility] [address]     High cholesterol     Other ill-defined conditions     hypercholesteremia    Psychiatric disorder     SCHIZOEFFECTIVE DISORDER    Schizoaffective disorder [facility])        Past Surgical History:   Procedure Laterality Date    HX CHOLECYSTECTOMY  03/03/2017    [facility]c-MR[facility]-HgDr. Claire Arnold    HX TUBAL LIGATION  1991         History reviewed. No pertinent family history. Social History     Social History    Marital status:      Spouse name: N/A    Number of children: N/A    Years of education: N/A     Occupational History    Not on file. Social History Main Topics    Smoking status: Never Smoker    Smokeless tobacco: Not on file    Alcohol use No    Drug use: No    Sexual activity: Not on file     Other Topics Concern    Not on file     Social History Narrative         ALLERGIES: Penicillins    Review of Systems   Constitutional: Negative for activity change and appetite change. HENT: Negative for facial swelling, sore throat and trouble swallowing. Eyes: Negative. Respiratory: Negative for shortness of breath. Cardiovascular: Negative. Gastrointestinal: Negative for abdominal pain, diarrhea and vomiting. Genitourinary: Negative for dysuria. Musculoskeletal: Positive for back pain. Negative for neck pain. Skin: Negative for color change. Neurological: Negative for headaches. Psychiatric/Behavioral: Negative. Vitals:    04/28/17 1233   BP: 123/83   Pulse: 60   Resp: 16   Temp: 98.1 °F (36.7 °C)   SpO2: 100%   Weight: 110.4 kg (243 lb 6.4 oz)   Height: 5' 4\" (1.626 m)            Physical Exam   Constitutional: She is oriented to person, place, and time. She appears well-nourished. Elderly black female; lives alone   HENT:   Head: Normocephalic. Right Ear: External ear normal.   Left Ear: External ear normal.   Mouth/Throat: Oropharynx is clear and moist.   Eyes: Pupils are equal, round, and reactive to light. Neck: Normal range of motion. Neck supple. Cardiovascular: Normal rate and regular rhythm. Pulmonary/Chest: Effort normal and breath sounds normal.   Abdominal: Soft. Bowel sounds are normal. She exhibits no distension and no mass. There is no tenderness. There is no rebound and no guarding. Musculoskeletal: Normal range of motion. Neurological: She is alert and oriented to person, place, and time. Skin: Skin is warm and dry. No rash noted. Nursing note and vitals reviewed. MDM  ED Course       Procedures    Pt has been re-examined and reports that her back pain is better and she is hungry. 2:38 PM  Patient's results and plan of care have been reviewed with her. Patient has  verbally conveyed her understanding and agreement of her signs, symptoms, diagnosis, treatment and prognosis and additionally agrees to follow up as recommended or return to the Emergency Room should her condition change prior to follow-up. Discharge instructions have also been provided to the patient with some educational information regarding her diagnosis as well a list of reasons why she would want to return to the ER prior to her follow-up appointment should her condition change. Vipul Ross NP

## 2017-08-24 ENCOUNTER — APPOINTMENT (OUTPATIENT)
Dept: GENERAL RADIOLOGY | Age: 57
End: 2017-08-24
Attending: PHYSICIAN ASSISTANT
Payer: MEDICARE

## 2017-08-24 ENCOUNTER — HOSPITAL ENCOUNTER (EMERGENCY)
Age: 57
Discharge: HOME | End: 2017-08-24
Attending: INTERNAL MEDICINE
Payer: MEDICARE

## 2017-08-24 VITALS
HEART RATE: 65 BPM | BODY MASS INDEX: 39.15 KG/M2 | RESPIRATION RATE: 20 BRPM | HEIGHT: 65 IN | TEMPERATURE: 98.9 F | WEIGHT: 235 LBS | DIASTOLIC BLOOD PRESSURE: 68 MMHG | OXYGEN SATURATION: 98 % | SYSTOLIC BLOOD PRESSURE: 125 MMHG

## 2017-08-24 DIAGNOSIS — F32.A FATIGUE DUE TO DEPRESSION: Primary | ICD-10-CM

## 2017-08-24 DIAGNOSIS — R53.83 FATIGUE DUE TO DEPRESSION: Primary | ICD-10-CM

## 2017-08-24 LAB
ALBUMIN SERPL-MCNC: 3.8 G/DL (ref 3.5–5)
ALBUMIN/GLOB SERPL: 1.1 {RATIO} (ref 1.1–2.2)
ALP SERPL-CCNC: 89 U/L (ref 45–117)
ALT SERPL-CCNC: 35 U/L (ref 12–78)
AMPHET UR QL SCN: NEGATIVE
ANION GAP SERPL CALC-SCNC: 5 MMOL/L (ref 5–15)
APPEARANCE UR: CLEAR
AST SERPL-CCNC: 24 U/L (ref 15–37)
BACTERIA URNS QL MICRO: NEGATIVE /HPF
BARBITURATES UR QL SCN: NEGATIVE
BASOPHILS # BLD: 0 K/UL (ref 0–0.1)
BASOPHILS NFR BLD: 0 % (ref 0–1)
BENZODIAZ UR QL: NEGATIVE
BILIRUB SERPL-MCNC: 0.3 MG/DL (ref 0.2–1)
BILIRUB UR QL: NEGATIVE
BNP SERPL-MCNC: 40 PG/ML (ref 0–125)
BUN SERPL-MCNC: 19 MG/DL (ref 6–20)
BUN/CREAT SERPL: 19 (ref 12–20)
CALCIUM SERPL-MCNC: 9.2 MG/DL (ref 8.5–10.1)
CANNABINOIDS UR QL SCN: NEGATIVE
CHLORIDE SERPL-SCNC: 103 MMOL/L (ref 97–108)
CO2 SERPL-SCNC: 31 MMOL/L (ref 21–32)
COCAINE UR QL SCN: NEGATIVE
COLOR UR: ABNORMAL
CREAT SERPL-MCNC: 0.99 MG/DL (ref 0.55–1.02)
D DIMER PPP FEU-MCNC: 0.53 MG/L FEU (ref 0–0.65)
DRUG SCRN COMMENT,DRGCM: NORMAL
EOSINOPHIL # BLD: 0.1 K/UL (ref 0–0.4)
EOSINOPHIL NFR BLD: 1 % (ref 0–7)
EPITH CASTS URNS QL MICRO: ABNORMAL /LPF
ERYTHROCYTE [DISTWIDTH] IN BLOOD BY AUTOMATED COUNT: 12.8 % (ref 11.5–14.5)
ETHANOL SERPL-MCNC: <10 MG/DL
GLOBULIN SER CALC-MCNC: 3.6 G/DL (ref 2–4)
GLUCOSE BLD STRIP.AUTO-MCNC: 98 MG/DL (ref 65–100)
GLUCOSE SERPL-MCNC: 92 MG/DL (ref 65–100)
GLUCOSE UR STRIP.AUTO-MCNC: NEGATIVE MG/DL
HCT VFR BLD AUTO: 37.6 % (ref 35–47)
HGB BLD-MCNC: 12.6 G/DL (ref 11.5–16)
HGB UR QL STRIP: NEGATIVE
KETONES UR QL STRIP.AUTO: ABNORMAL MG/DL
LEUKOCYTE ESTERASE UR QL STRIP.AUTO: ABNORMAL
LYMPHOCYTES # BLD: 4 K/UL (ref 0.8–3.5)
LYMPHOCYTES NFR BLD: 45 % (ref 12–49)
MCH RBC QN AUTO: 32.1 PG (ref 26–34)
MCHC RBC AUTO-ENTMCNC: 33.5 G/DL (ref 30–36.5)
MCV RBC AUTO: 95.9 FL (ref 80–99)
METHADONE UR QL: NEGATIVE
MONOCYTES # BLD: 0.7 K/UL (ref 0–1)
MONOCYTES NFR BLD: 7 % (ref 5–13)
NEUTS SEG # BLD: 4.2 K/UL (ref 1.8–8)
NEUTS SEG NFR BLD: 47 % (ref 32–75)
NITRITE UR QL STRIP.AUTO: NEGATIVE
OPIATES UR QL: NEGATIVE
PCP UR QL: NEGATIVE
PH UR STRIP: 6 [PH] (ref 5–8)
PLATELET # BLD AUTO: 210 K/UL (ref 150–400)
POTASSIUM SERPL-SCNC: 4.2 MMOL/L (ref 3.5–5.1)
PROT SERPL-MCNC: 7.4 G/DL (ref 6.4–8.2)
PROT UR STRIP-MCNC: NEGATIVE MG/DL
RBC # BLD AUTO: 3.92 M/UL (ref 3.8–5.2)
RBC #/AREA URNS HPF: ABNORMAL /HPF (ref 0–5)
SERVICE CMNT-IMP: NORMAL
SODIUM SERPL-SCNC: 139 MMOL/L (ref 136–145)
SP GR UR REFRACTOMETRY: 1.02 (ref 1–1.03)
TROPONIN I SERPL-MCNC: <0.04 NG/ML
UA: UC IF INDICATED,UAUC: ABNORMAL
UROBILINOGEN UR QL STRIP.AUTO: 1 EU/DL (ref 0.2–1)
WBC # BLD AUTO: 9 K/UL (ref 3.6–11)
WBC URNS QL MICRO: ABNORMAL /HPF (ref 0–4)

## 2017-08-24 PROCEDURE — 93005 ELECTROCARDIOGRAM TRACING: CPT

## 2017-08-24 PROCEDURE — 81001 URINALYSIS AUTO W/SCOPE: CPT | Performed by: PHYSICIAN ASSISTANT

## 2017-08-24 PROCEDURE — 85025 COMPLETE CBC W/AUTO DIFF WBC: CPT | Performed by: PHYSICIAN ASSISTANT

## 2017-08-24 PROCEDURE — 80307 DRUG TEST PRSMV CHEM ANLYZR: CPT | Performed by: PHYSICIAN ASSISTANT

## 2017-08-24 PROCEDURE — 90791 PSYCH DIAGNOSTIC EVALUATION: CPT

## 2017-08-24 PROCEDURE — 36415 COLL VENOUS BLD VENIPUNCTURE: CPT | Performed by: PHYSICIAN ASSISTANT

## 2017-08-24 PROCEDURE — 83880 ASSAY OF NATRIURETIC PEPTIDE: CPT | Performed by: PHYSICIAN ASSISTANT

## 2017-08-24 PROCEDURE — 85379 FIBRIN DEGRADATION QUANT: CPT | Performed by: PHYSICIAN ASSISTANT

## 2017-08-24 PROCEDURE — 80053 COMPREHEN METABOLIC PANEL: CPT | Performed by: PHYSICIAN ASSISTANT

## 2017-08-24 PROCEDURE — 99285 EMERGENCY DEPT VISIT HI MDM: CPT

## 2017-08-24 PROCEDURE — 82962 GLUCOSE BLOOD TEST: CPT

## 2017-08-24 PROCEDURE — 84484 ASSAY OF TROPONIN QUANT: CPT | Performed by: PHYSICIAN ASSISTANT

## 2017-08-24 PROCEDURE — 71020 XR CHEST PA LAT: CPT

## 2017-08-24 RX ORDER — SODIUM CHLORIDE 0.9 % (FLUSH) 0.9 %
5-10 SYRINGE (ML) INJECTION AS NEEDED
Status: DISCONTINUED | OUTPATIENT
Start: 2017-08-24 | End: 2017-08-24 | Stop reason: HOSPADM

## 2017-08-24 RX ORDER — SODIUM CHLORIDE 0.9 % (FLUSH) 0.9 %
5-10 SYRINGE (ML) INJECTION EVERY 8 HOURS
Status: DISCONTINUED | OUTPATIENT
Start: 2017-08-24 | End: 2017-08-24 | Stop reason: HOSPADM

## 2017-08-24 NOTE — ED NOTES
Patient reports that she had blood work drawn today, patient reports #2 tubes. Unknown what they where for.

## 2017-08-24 NOTE — ED NOTES
Patient ambulatory without difficulty to room. Patient is reporting that she needs to go upstairs a stay for a couple of days.

## 2017-08-24 NOTE — BSMART NOTE
Comprehensive Assessment Form Part 1      Section I - Disposition    Axis I - Schizoaffective Disorder  Axis II - Deferred  Axis III -   Past Medical History:   Diagnosis Date    Diabetes ([facility] [address]     High cholesterol     Other ill-defined conditions     hypercholesteremia    Psychiatric disorder     SCHIZOEFFECTIVE DISORDER    Schizoaffective disorder ([facility] [address]    Axis IV - lack of family/social support, lack of structure   [place] [address] - 60      The Medical Doctor to Psychiatrist conference was not completed. The Medical Doctor is in agreement with Psychiatrist disposition because of (reason) patient is not seeking psychiatric admission. The plan is discharge once medically cleared. Patient will be seen tomorrow by PACT team.  The on-call Psychiatrist consulted was Dr. Elisabeth Barth. The admitting Psychiatrist will be Dr. Elisabeth Barth. The admitting Diagnosis is NA. The Payor source is [employer]. Section II - Integrated Summary  Summary:  Patient arrives to ER reporting shortness of breath and chest pain. Patient denies suicidal and homicidal ideation. She denies hallucinations. Patient reports that she feels like she is taking too much medicine which is causing her to sleep 10-12 hours a day. She reports that she met with her PCP and psychiatrist today at [facility] and that her PCP is changing or adding a medication to help. Patient reports \"I need to get my physical health better. \" Patient denies needing mental health services currently stating \"the counselor brings me my pills each day and watches me take the morning dose. \" Patient reports medication compliance with her evening doses. She reports living in an assisted living for 7 years and although she likes it there she thinks it is time for a change. She was referred to her counselor to discuss this. Patient does admit to feeling anxious at times saying \"it feels like I'm being rushed\" but does not know why she is rushed.  Patient reports some depression due to family issues. Patient reported 3 separate times that she is seeking medical health not mental health and has multiple mental health services currently in place. Patient reports her  dropped her off at the ER to get her medical issues checked and will check on her tomorrow. Per chart review patient was last admitted to [facility] under a TDO from [place] AUDIE [facility] on 5/21/14-5/28/14 and prior to that 6/29-7/7/09. The patienthas demonstrated mental capacity to provide informed consent. The information is given by the patient and past medical records. The Chief Complaint is shortness of breath, chest pain, mental health. The Precipitant Factors are chronic medical and mental health illness. Previous Hospitalizations: Yes  The patient has not previously been in restraints. Current Psychiatrist and/or  is Juan Alberto Aguero, and PACT team.    Lethality Assessment:    The potential for suicide noted by the following: not noted . The potential for homicide is not noted. The patient has not been a perpetrator of sexual or physical abuse. There are not pending charges. The patient is not felt to be at risk for self harm or harm to others. The attending nurse was advised that security has not been notified. Section III - Psychosocial  The patient's overall mood and attitude is anxious and sad. Feelings of helplessness and hopelessness are not observed. Generalized anxiety is observed by verbal report. Panic is not observed. Phobias are not observed. Obsessive compulsive tendencies are not observed. Section IV - Mental Status Exam  The patient's appearance shows no evidence of impairment. The patient's behavior shows no evidence of impairment. The patient is oriented to time, place, person and situation. The patient's speech shows no evidence of impairment. The patient's mood is anxious and is sad. The range of affect is flat. The patient's thought content demonstrates no evidence of impairment. The thought process shows no evidence of impairment. The patient's perception shows no evidence of impairment. The patient's memory shows no evidence of impairment. The patient's appetite shows no evidence of impairment. The patient's sleep shows no evidence of impairment. The patient's insight shows no evidence of impairment. The patient's judgement shows no evidence of impairment. Section V - Substance Abuse  The patient is not using substances. Section VI - Living Arrangements  The patient is single. The patient lives in an [facility]. The patient has adult children. The patient does plan to return home upon discharge. The patient does not have legal issues pending. The patient's source of income comes from social security. Zoroastrianism and cultural practices have not been voiced at this time. The patient's greatest support comes from [facility] and [facility] staff and this person will be involved with the treatment. The patient has not been in an event described as horrible or outside the realm of ordinary life experience either currently or in the past.  The patient has not been a victim of sexual/physical abuse. Section VII - Other Areas of Clinical Concern  The highest grade achieved is not assessed with the overall quality of school experience being described as not assessed. The patient is currently disabled and speaks [place] as a primary language. The patient has no communication impairments affecting communication. The patient's preference for learning can be described as: can read and write adequately. The patient's hearing is normal.  The patient's vision is impaired and  wears glasses or contacts.       Won Been MA [facility] Resident in Counseling

## 2017-08-24 NOTE — ED PROVIDER NOTES
HPI Comments: Prachi Connell is a 56 y.o. female w/ hx significant for DM, hypercholesteremia, Schizoaffective disorder who presents ambulatory to the ED c/o increased depression, fatigue, crying, and eating x 2 weeks. Pt endorses her children have not been answering her calls/ talking to her. Pt additionally endorses increased SOB x an undetermined amount of time (pt unable to indicate). Pt specifically denies fever, chills, n/v, abd pain, headache, vision changes, CP, palpitations, SI, HI, hallucinations, delusions. PCP: Vale Gomez MD    There are no other complaints, changes or physical findings at this time. Patient is a 56 y.o. female presenting with mental health disorder and shortness of breath. The history is provided by the patient. The history is limited by the condition of the patient (Pt is a poor historian w/ tangential talking/ thought process. ). Mental Health Problem    This is a new problem. The current episode started more than 1 week ago. The problem has been gradually worsening. Pertinent negatives include no confusion, no somnolence, no seizures, no unresponsiveness, no weakness, no agitation, no delusions, no hallucinations, no self-injury, no violence, no tingling and no numbness. Mental status baseline is normal.  Risk factors: Pt denies any recent change in medications or lifestyle. Her past medical history is significant for diabetes and psychotropic medication treatment. Shortness of Breath   This is a new problem. The problem occurs continuously. Episode onset: unknown onset. The problem has been gradually worsening. Pertinent negatives include no fever, no headaches, no coryza, no rhinorrhea, no sore throat, no swollen glands, no ear pain, no neck pain, no cough, no sputum production, no hemoptysis, no wheezing, no PND, no orthopnea, no chest pain, no syncope, no vomiting, no abdominal pain, no rash, no leg pain, no leg swelling and no claudication.  It is unknown what precipitated the problem. She has tried nothing for the symptoms. She has had prior ED visits. Past Medical History:   Diagnosis Date    Diabetes ([address]     High cholesterol     Other ill-defined conditions     hypercholesteremia    Psychiatric disorder     SCHIZOEFFECTIVE DISORDER    Schizoaffective disorder [facility])        Past Surgical History:   Procedure Laterality Date    HX CHOLECYSTECTOMY  03/03/2017    beto colmenaresuenex-ITC-[id number] Yuan    HX TUBAL LIGATION  1991         No family history on file. Social History     Social History    Marital status:      Spouse name: N/A    Number of children: N/A    Years of education: N/A     Occupational History    Not on file. Social History Main Topics    Smoking status: Never Smoker    Smokeless tobacco: Not on file    Alcohol use No    Drug use: No    Sexual activity: Not on file     Other Topics Concern    Not on file     Social History Narrative         ALLERGIES: Penicillins    Review of Systems   Constitutional: Positive for activity change and appetite change. Negative for chills, diaphoresis, fatigue, fever and unexpected weight change. HENT: Negative. Negative for congestion, ear pain, postnasal drip, rhinorrhea, sinus pressure, sneezing, sore throat and voice change. Eyes: Negative for photophobia, pain and visual disturbance. Respiratory: Positive for shortness of breath. Negative for cough, hemoptysis, sputum production, chest tightness and wheezing. Cardiovascular: Negative for chest pain, palpitations, orthopnea, claudication, leg swelling, syncope and PND. Gastrointestinal: Negative for abdominal pain, blood in stool, constipation, diarrhea, nausea and vomiting. Genitourinary: Negative. Negative for dysuria, frequency and urgency. Pt endorses frequent and recurrent UTIs. Musculoskeletal: Positive for back pain (Pt endorses back pain \"the other day\". ).  Negative for gait problem and neck pain.   Skin: Negative for color change, pallor, rash and wound. Neurological: Negative for dizziness, tingling, seizures, syncope, facial asymmetry, speech difficulty, weakness, light-headedness, numbness and headaches. Psychiatric/Behavioral: Negative for agitation, confusion, hallucinations and self-injury. There were no vitals filed for this visit. Physical Exam   Constitutional: She is oriented to person, place, and time. She appears well-developed and well-nourished. No distress. HENT:   Head: Normocephalic and atraumatic. Right Ear: Hearing and external ear normal.   Left Ear: Hearing and external ear normal.   Nose: Nose normal.   Mouth/Throat: Oropharynx is clear and moist. No oropharyngeal exudate. Eyes: Conjunctivae and EOM are normal. Pupils are equal, round, and reactive to light. Neck: Normal range of motion. Cardiovascular: Normal rate, regular rhythm, normal heart sounds and intact distal pulses. Exam reveals no gallop and no friction rub. No murmur heard. Pulmonary/Chest: Effort normal and breath sounds normal. No respiratory distress. She has no wheezes. She has no rales. She exhibits no tenderness. Abdominal: Soft. Bowel sounds are normal. She exhibits no distension and no mass. There is no tenderness. There is no rebound and no guarding. Musculoskeletal: Normal range of motion. Neurological: She is alert and oriented to person, place, and time. No cranial nerve deficit. Skin: Skin is warm and dry. She is not diaphoretic. Psychiatric: She has a normal mood and affect. Her behavior is normal. Judgment and thought content normal. Her mood appears not anxious. Her affect is not angry. Her speech is tangential. Her speech is not rapid and/or pressured, not delayed and not slurred. She is not actively hallucinating. Thought content is not paranoid and not delusional. She does not exhibit a depressed mood. She expresses no homicidal and no suicidal ideation. She expresses no suicidal plans and no homicidal plans. She is communicative. Nursing note and vitals reviewed.        MDM  Number of Diagnoses or Management Options  Fatigue due to depression:   Diagnosis management comments: DDx: depression, anxiety, schizoaffective disorder, medication problem, PE, CHF, ACS    LABORATORY TESTS:  Recent Results (from the past 12 hour(s))  -CBC WITH AUTOMATED DIFF  Collection Time: 08/24/17  4:15 PM       Result                                            Value                         Ref Range                       WBC                                               9.0                           3.6 - 11.0 K/uL                 RBC                                               3.92                          3.80 - 5.20 M/uL                HGB                                               12.6                          11.5 - 16.0 g/dL                HCT                                               37.6                          35.0 - 47.0 %                   MCV                                               95.9                          80.0 - 99.0 FL                  MCH                                               32.1                          26.0 - 34.0 PG                  MCHC                                              33.5                          30.0 - 36.5 g/dL                RDW                                               12.8                          11.5 - 14.5 %                   PLATELET                                          210                           150 - 400 K/uL                  NEUTROPHILS                                       47                            32 - 75 %                       LYMPHOCYTES                                       45                            12 - 49 %                       MONOCYTES                                         7                             5 - 13 %                        EOSINOPHILS 1                             0 - 7 %                         BASOPHILS                                         0                             0 - 1 %                         ABS. NEUTROPHILS                                  4.2                           1.8 - 8.0 K/UL                  ABS. LYMPHOCYTES                                  4.0 (H)                       0.8 - 3.5 K/UL                  ABS. MONOCYTES                                    0.7                           0.0 - 1.0 K/UL                  ABS. EOSINOPHILS                                  0.1                           0.0 - 0.4 K/UL                  ABS.  BASOPHILS                                    0.0                           0.0 - 0.1 K/UL             -METABOLIC PANEL, COMPREHENSIVE  Collection Time: 08/24/17  4:15 PM       Result                                            Value                         Ref Range                       Sodium                                            139                           136 - 145 mmol/L                Potassium                                         4.2                           3.5 - 5.1 mmol/L                Chloride                                          103                           97 - 108 mmol/L                 CO2                                               31                            21 - 32 mmol/L                  Anion gap                                         5                             5 - 15 mmol/L                   Glucose                                           92                            65 - 100 mg/dL                  BUN                                               19                            6 - 20 MG/DL                    Creatinine                                        0.99                          0.55 - 1.02 MG/DL               BUN/Creatinine ratio                              19                            12 - 20                         GFR est AA >60                           >60 ml/min/1.73m2               GFR est non-AA                                    58 (L)                        >60 ml/min/1.73m2               Calcium                                           9.2                           8.5 - 10.1 MG/DL                Bilirubin, total                                  0.3                           0.2 - 1.0 MG/DL                 ALT (SGPT)                                        35                            12 - 78 U/L                     AST (SGOT)                                        24                            15 - 37 U/L                     Alk. phosphatase                                  89                            45 - 117 U/L                    Protein, total                                    7.4                           6.4 - 8.2 g/dL                  Albumin                                           3.8                           3.5 - 5.0 g/dL                  Globulin                                          3.6                           2.0 - 4.0 g/dL                  A-G Ratio                                         1.1                           1.1 - 2.2                  -ETHYL ALCOHOL  Collection Time: 08/24/17  4:15 PM       Result                                            Value                         Ref Range                       ALCOHOL(ETHYL),SERUM                              <10                           <10 MG/DL                  -D DIMER  Collection Time: 08/24/17  4:15 PM       Result                                            Value                         Ref Range                       D-dimer                                           0.53                          0.00 - 0.65 mg/L FEU       -NT-PRO BNP  Collection Time: 08/24/17  4:15 PM       Result                                            Value                         Ref Range                       NT pro-BNP 40                            0 - 125 PG/ML              -TROPONIN I  Collection Time: 08/24/17  4:15 PM       Result                                            Value                         Ref Range                       Troponin-I, Qt.                                   <0.04                         <0.05 ng/mL                -GLUCOSE, POC  Collection Time: 08/24/17  4:22 PM       Result                                            Value                         Ref Range                       Glucose (POC)                                     98                            65 - 100 mg/dL                  Performed by                                      [facility], URINE  Collection Time: 08/24/17  4:45 PM       Result                                            Value                         Ref Range                       AMPHETAMINES                                      NEGATIVE                      NEG                             BARBITURATES                                      NEGATIVE                      NEG                             BENZODIAZEPINE                                    NEGATIVE                      NEG                             COCAINE                                           NEGATIVE                      NEG                             METHADONE                                         NEGATIVE                      NEG                             OPIATES                                           NEGATIVE                      NEG                             PCP(PHENCYCLIDINE)                                NEGATIVE                      NEG                             THC (TH-CANNABINOL)                               NEGATIVE                      NEG                             Drug screen comment                               (NOTE)                                                   -URINALYSIS W/ REFLEX CULTURE  Collection Time: 08/24/17  4:45 PM       Result                                            Value                         Ref Range                       Color                                             YELLOW/STRAW                                                  Appearance                                        CLEAR                         CLEAR                           Specific gravity                                  1.025                         1.003 - 1.030                   pH (UA)                                           6.0                           5.0 - 8.0                       Protein                                           NEGATIVE                      NEG mg/dL                       Glucose                                           NEGATIVE                      NEG mg/dL                       Ketone                                            TRACE (A)                     NEG mg/dL                       Bilirubin                                         NEGATIVE                      NEG                             Blood                                             NEGATIVE                      NEG                             Urobilinogen                                      1.0                           0.2 - 1.0 EU/dL                 Nitrites                                          NEGATIVE                      NEG                             Leukocyte Esterase                                SMALL (A)                     NEG                             WBC                                               0-4                           0 - 4 /hpf                      RBC                                               0-5                           0 - 5 /hpf                      Epithelial cells                                  FEW                           FEW /lpf                        Bacteria                                          NEGATIVE                      NEG /hpf                        UA:UC IF INDICATED                                                              CNI                         CULTURE NOT INDICATED BY UA RESULT    IMAGING RESULTS:  XR CHEST PA LAT   Final Result   Exam: PA and lateral views of the chest.     Direct comparison is made to prior CXR dated April 2017.     Findings: Cardiomediastinal silhouette is stable. Lungs are clear bilaterally. Pleural spaces are normal. Osseous structures are unchanged.      IMPRESSION  IMPRESSION: No acute cardiopulmonary disease. MEDICATIONS GIVEN:  Medications  sodium chloride (NS) flush 5-10 mL (not administered)  sodium chloride (NS) flush 5-10 mL (not administered)    IMPRESSION:  Fatigue due to depression  (primary encounter diagnosis)    PLAN:  1. Current Discharge Medication List    CONTINUE these medications which have NOT CHANGED    traMADol (ULTRAM) 50 mg tablet  Take 1 Tab by mouth every six (6) hours as needed for Pain. Max Daily Amount: 200 mg. Qty: 20 Tab Refills: 0    chlorhexidine (PERIDEX) 0.12 % solution  SWISH AND SPIT USING 5 ML BY MOUTH THREE TIMES DAILY FOR 10 DAYS  Qty: 473 mL Refills: 0    oxyCODONE-acetaminophen (PERCOCET) 5-325 mg per tablet  Take 1-2 Tabs by mouth every four (4) hours as needed for Pain. Max Daily Amount: 12 Tabs. Qty: 40 Tab Refills: 0    lovastatin (MEVACOR) 40 mg tablet  Take 40 mg by mouth nightly.    haloperidol decanoate (HALDOL DECANOATE) 100 mg/mL injection  200 mg by IntraMUSCular route every twenty-eight (28) days. benztropine (COGENTIN) 0.5 mg tablet  Take 0.5 mg by mouth two (2) times a day. divalproex ER (DEPAKOTE ER) 500 mg ER tablet  Take 500 mg by mouth nightly. escitalopram oxalate (LEXAPRO) 20 mg tablet  Take 20 mg by mouth daily. haloperidol (HALDOL) 10 mg tablet  Take 10 mg by mouth nightly. POTASSIUM CHLORIDE SR 10 MEQ TAB  10 mEq daily. ziprasidone (GEODON) 80 mg capsule  Take 80 mg by mouth nightly.         2. Follow-up Information     Follow up With Details Comments Contact Info    Jenny Forrest MD Schedule an appointment as soon as possible   for a visit in 1 week As needed, If symptoms worsen [address]      Belkys Hernandez MD Schedule an appointment as soon as possible for a   visit in 1 week As needed, If symptoms worsen [address]        Return to ED if worse                  Amount and/or Complexity of Data Reviewed  Clinical lab tests: ordered and reviewed  Tests in the radiology section of CPT®: ordered and reviewed    Patient Progress  Patient progress: stable    ED Course       Procedures    EKG interpretation: (Preliminary)  Rhythm: normal sinus rhythm with shortened AR; and regular . Rate (approx.): 65bpm; Axis: left axis deviation; AR interval: shortened; QRS interval: normal ; ST/T wave: non-specific changes; Other findings: abnormal ekg.    5:39 PM  [facility] has assessed the pt and established that the pt is not critical and does not need to be admitted for further care/ tx. Follow up with PCP/ psychiatrist this week. 5:40 PM  I have discussed with patient their diagnosis, treatment, and follow up plan. The patient agrees to follow up as outlined in discharge paperwork and also to return to the ED with any worsening.  Sultana Velazco PA-C

## 2017-08-24 NOTE — ED NOTES
Spoke with Erinn from [facility] who stated that pt could follow up with PACT team tomorrow

## 2017-08-24 NOTE — ED NOTES
.See Nursing Assessment      Emergency Department Nursing Plan of Care       The Nursing Plan of Care is developed from the Nursing assessment and Emergency Department Attending provider initial evaluation. The plan of care may be reviewed in the ED Provider note.     The Plan of Care was developed with the following considerations:   Patient / Family readiness to learn indicated by:verbalized understanding  Persons(s) to be included in education: patient  Barriers to Learning/Limitations:No    Signed     Nimisha Dunham RN    8/24/2017   4:05 PM

## 2017-08-24 NOTE — DISCHARGE INSTRUCTIONS
Depression and Chronic Disease: Care Instructions  Your Care Instructions  A chronic disease is one that you have for a long time. Some chronic diseases can be controlled, but they usually cannot be cured. Depression is common in people with chronic diseases, but it often goes unnoticed. Many people have concerns about seeking treatment for a mental health problem. You may think it's a sign of weakness, or you don't want people to know about it. It's important to overcome these reasons for not seeking treatment. Treating depression or anxiety is good for your health. Follow-up care is a key part of your treatment and safety. Be sure to make and go to all appointments, and call your doctor if you are having problems. It's also a good idea to know your test results and keep a list of the medicines you take. How can you care for yourself at home? Watch for symptoms of depression  The symptoms of depression are often subtle at first. You may think they are caused by your disease rather than depression. Or you may think it is normal to be depressed when you have a chronic disease. If you are depressed you may:  · Feel sad or hopeless. · Feel guilty or worthless. · Not enjoy the things you used to enjoy. · Feel hopeless, as though life is not worth living. · Have trouble thinking or remembering. · Have low energy, and you may not eat or sleep well. · Pull away from others. · Think often about death or killing yourself. (Keep the numbers for these national suicide hotlines: 9-474-151-TALK [1-781.324.8717] and 7-832-BSREBVS [1-547.194.9780]. )  Get treatment  By treating your depression, you can feel more hopeful and have more energy. If you feel better, you may take better care of yourself, so your health may improve. · Talk to your doctor if you have any changes in mood during treatment for your disease. · Ask your doctor for help.  Counseling, antidepressant medicine, or a combination of the two can help most people with depression. Often a combination works best. Counseling can also help you cope with having a chronic disease. When should you call for help? Call 911 anytime you think you may need emergency care. For example, call if:  · You feel like hurting yourself or someone else. · Someone you know has depression and is about to attempt or is attempting suicide. Call your doctor now or seek immediate medical care if:  · You hear voices. · Someone you know has depression and:  ¨ Starts to give away his or her possessions. ¨ Uses illegal drugs or drinks alcohol heavily. ¨ Talks or writes about death, including writing suicide notes or talking about guns, knives, or pills. ¨ Starts to spend a lot of time alone. ¨ Acts very aggressively or suddenly appears calm. Watch closely for changes in your health, and be sure to contact your doctor if:  · You do not get better as expected. Where can you learn more? Go to http://sterling-ishaan.info/. Enter X443 in the search box to learn more about \"Depression and Chronic Disease: Care Instructions. \"  Current as of: July 26, 2016  Content Version: 11.3  © 1586-9751 [employer]. Care instructions adapted under license by [employer] (which disclaims liability or warranty for this information). If you have questions about a medical condition or this instruction, always ask your healthcare professional. [address] any warranty or liability for your use of this information.

## 2017-08-24 NOTE — ED NOTES
Patient given copy of discharge instructions and 0 script(s). Patient given a current medication reconciliation form and verbalized understanding of their medications and importance of discussing medications at follow-up. Patient stable at discharge. Ambulatory out of ED with self. Yellow cab called for pt.

## 2017-08-24 NOTE — ED TRIAGE NOTES
C/o auditory hallucinations with depression starting today, denies SI/HI  C/o SOB and chest pain, worse at night, \"i think I have sleep apnea,\" no SOB noted in triage

## 2017-08-24 NOTE — ED NOTES
Pt states that she has nowhere to go and that she wants to stay here; explained to patient that there is no medical or psychiatric reason to keep her here; pt states that she is unsure of if she can go back to her assisted living facility; requested nurse call her sister, Meenakshi Miller @ [phone], with no answer

## 2017-08-24 NOTE — ED NOTES
[facility] at bedside, volume not functioning properly, pt given phone and is speaking to [facility] via phone

## 2017-08-24 NOTE — ED NOTES
Patient reports having \"crying spells\" x 2 weeks. Patient reports that she has only been eating and sleeping. Patient denies any changes in her medications, has a paper with her with all her scripts. Patient denies any changes in her living facility, pt reports that she stopped exercising & reports that she has a UTI at this time. Patient denies suicidal or homicidal ideations today. Patient was asked if she was hearing voices today, patient states \"oh I don't know, when I go to sleep I don't hear nothing\". Patient reports that she cries because she calls her children & they don't call her back. Patient reports that she eats to fast which could be causing her shortness of breath & chest pain, unknown how long this has been going on. Patient states \"I came in here to get a break\".

## 2017-08-25 LAB
ATRIAL RATE: 65 BPM
CALCULATED P AXIS, ECG09: 61 DEGREES
CALCULATED R AXIS, ECG10: -35 DEGREES
CALCULATED T AXIS, ECG11: -15 DEGREES
DIAGNOSIS, 93000: NORMAL
P-R INTERVAL, ECG05: 108 MS
Q-T INTERVAL, ECG07: 426 MS
QRS DURATION, ECG06: 88 MS
QTC CALCULATION (BEZET), ECG08: 443 MS
VENTRICULAR RATE, ECG03: 65 BPM

## 2017-10-25 ENCOUNTER — HOSPITAL ENCOUNTER (OUTPATIENT)
Age: 57
Setting detail: OBSERVATION
Discharge: HOME | End: 2017-10-25
Attending: EMERGENCY MEDICINE | Admitting: INTERNAL MEDICINE
Payer: MEDICARE

## 2017-10-25 ENCOUNTER — APPOINTMENT (OUTPATIENT)
Dept: GENERAL RADIOLOGY | Age: 57
End: 2017-10-25
Attending: EMERGENCY MEDICINE
Payer: MEDICARE

## 2017-10-25 VITALS
WEIGHT: 237 LBS | OXYGEN SATURATION: 97 % | BODY MASS INDEX: 40.46 KG/M2 | HEIGHT: 64 IN | DIASTOLIC BLOOD PRESSURE: 72 MMHG | TEMPERATURE: 97.1 F | RESPIRATION RATE: 16 BRPM | SYSTOLIC BLOOD PRESSURE: 132 MMHG | HEART RATE: 62 BPM

## 2017-10-25 DIAGNOSIS — R07.9 ACUTE CHEST PAIN: Primary | ICD-10-CM

## 2017-10-25 LAB
ALBUMIN SERPL-MCNC: 3.2 G/DL (ref 3.5–5)
ALBUMIN/GLOB SERPL: 0.9 {RATIO} (ref 1.1–2.2)
ALP SERPL-CCNC: 80 U/L (ref 45–117)
ALT SERPL-CCNC: 20 U/L (ref 12–78)
ANION GAP SERPL CALC-SCNC: 8 MMOL/L (ref 5–15)
APPEARANCE UR: CLEAR
AST SERPL-CCNC: 15 U/L (ref 15–37)
ATRIAL RATE: 59 BPM
BACTERIA URNS QL MICRO: NEGATIVE /HPF
BASOPHILS # BLD: 0 K/UL (ref 0–0.1)
BASOPHILS NFR BLD: 0 % (ref 0–1)
BILIRUB SERPL-MCNC: 0.3 MG/DL (ref 0.2–1)
BILIRUB UR QL: NEGATIVE
BUN SERPL-MCNC: 15 MG/DL (ref 6–20)
BUN/CREAT SERPL: 18 (ref 12–20)
C TRACH DNA SPEC QL NAA+PROBE: NEGATIVE
CALCIUM SERPL-MCNC: 8.7 MG/DL (ref 8.5–10.1)
CALCULATED P AXIS, ECG09: 48 DEGREES
CALCULATED R AXIS, ECG10: -26 DEGREES
CALCULATED T AXIS, ECG11: -14 DEGREES
CHLORIDE SERPL-SCNC: 104 MMOL/L (ref 97–108)
CK MB CFR SERPL CALC: NORMAL % (ref 0–2.5)
CK MB SERPL-MCNC: <1 NG/ML (ref 5–25)
CK SERPL-CCNC: 79 U/L (ref 26–192)
CLUE CELLS VAG QL WET PREP: NORMAL
CO2 SERPL-SCNC: 26 MMOL/L (ref 21–32)
COLOR UR: ABNORMAL
CREAT SERPL-MCNC: 0.85 MG/DL (ref 0.55–1.02)
D DIMER PPP FEU-MCNC: 0.37 MG/L FEU (ref 0–0.65)
DIAGNOSIS, 93000: NORMAL
EOSINOPHIL # BLD: 0.1 K/UL (ref 0–0.4)
EOSINOPHIL NFR BLD: 2 % (ref 0–7)
EPITH CASTS URNS QL MICRO: ABNORMAL /LPF
ERYTHROCYTE [DISTWIDTH] IN BLOOD BY AUTOMATED COUNT: 13.5 % (ref 11.5–14.5)
GLOBULIN SER CALC-MCNC: 3.6 G/DL (ref 2–4)
GLUCOSE SERPL-MCNC: 126 MG/DL (ref 65–100)
GLUCOSE UR STRIP.AUTO-MCNC: NEGATIVE MG/DL
HCG UR QL: NEGATIVE
HCT VFR BLD AUTO: 34.5 % (ref 35–47)
HGB BLD-MCNC: 11.3 G/DL (ref 11.5–16)
HGB UR QL STRIP: NEGATIVE
KETONES UR QL STRIP.AUTO: ABNORMAL MG/DL
LEUKOCYTE ESTERASE UR QL STRIP.AUTO: NEGATIVE
LIPASE SERPL-CCNC: 99 U/L (ref 73–393)
LYMPHOCYTES # BLD: 3.8 K/UL (ref 0.8–3.5)
LYMPHOCYTES NFR BLD: 56 % (ref 12–49)
MAGNESIUM SERPL-MCNC: 2.1 MG/DL (ref 1.6–2.4)
MCH RBC QN AUTO: 31.7 PG (ref 26–34)
MCHC RBC AUTO-ENTMCNC: 32.8 G/DL (ref 30–36.5)
MCV RBC AUTO: 96.6 FL (ref 80–99)
MONOCYTES # BLD: 0.4 K/UL (ref 0–1)
MONOCYTES NFR BLD: 6 % (ref 5–13)
MUCOUS THREADS URNS QL MICRO: ABNORMAL /LPF
N GONORRHOEA DNA SPEC QL NAA+PROBE: NEGATIVE
NEUTS SEG # BLD: 2.4 K/UL (ref 1.8–8)
NEUTS SEG NFR BLD: 36 % (ref 32–75)
NITRITE UR QL STRIP.AUTO: NEGATIVE
P-R INTERVAL, ECG05: 124 MS
PH UR STRIP: 6.5 [PH] (ref 5–8)
PLATELET # BLD AUTO: 170 K/UL (ref 150–400)
POTASSIUM SERPL-SCNC: 4.1 MMOL/L (ref 3.5–5.1)
PROT SERPL-MCNC: 6.8 G/DL (ref 6.4–8.2)
PROT UR STRIP-MCNC: NEGATIVE MG/DL
Q-T INTERVAL, ECG07: 450 MS
QRS DURATION, ECG06: 84 MS
QTC CALCULATION (BEZET), ECG08: 445 MS
RBC # BLD AUTO: 3.57 M/UL (ref 3.8–5.2)
RBC #/AREA URNS HPF: ABNORMAL /HPF (ref 0–5)
SAMPLE TYPE: NORMAL
SERVICE CMNT-IMP: NORMAL
SODIUM SERPL-SCNC: 138 MMOL/L (ref 136–145)
SP GR UR REFRACTOMETRY: 1.03 (ref 1–1.03)
SPECIMEN SOURCE: NORMAL
T VAGINALIS VAG QL WET PREP: NORMAL
TROPONIN I BLD-MCNC: <0.04 NG/ML (ref 0–0.08)
TROPONIN I SERPL-MCNC: <0.04 NG/ML
UA: UC IF INDICATED,UAUC: ABNORMAL
UROBILINOGEN UR QL STRIP.AUTO: 0.2 EU/DL (ref 0.2–1)
VENTRICULAR RATE, ECG03: 59 BPM
WBC # BLD AUTO: 6.8 K/UL (ref 3.6–11)
WBC URNS QL MICRO: ABNORMAL /HPF (ref 0–4)

## 2017-10-25 PROCEDURE — 81025 URINE PREGNANCY TEST: CPT

## 2017-10-25 PROCEDURE — 84484 ASSAY OF TROPONIN QUANT: CPT | Performed by: EMERGENCY MEDICINE

## 2017-10-25 PROCEDURE — 96372 THER/PROPH/DIAG INJ SC/IM: CPT

## 2017-10-25 PROCEDURE — 99218 HC RM OBSERVATION: CPT

## 2017-10-25 PROCEDURE — C8929 TTE W OR WO FOL WCON,DOPPLER: HCPCS

## 2017-10-25 PROCEDURE — 83735 ASSAY OF MAGNESIUM: CPT | Performed by: EMERGENCY MEDICINE

## 2017-10-25 PROCEDURE — 74011250637 HC RX REV CODE- 250/637: Performed by: INTERNAL MEDICINE

## 2017-10-25 PROCEDURE — 74011000250 HC RX REV CODE- 250: Performed by: INTERNAL MEDICINE

## 2017-10-25 PROCEDURE — 85025 COMPLETE CBC W/AUTO DIFF WBC: CPT | Performed by: EMERGENCY MEDICINE

## 2017-10-25 PROCEDURE — 83690 ASSAY OF LIPASE: CPT | Performed by: EMERGENCY MEDICINE

## 2017-10-25 PROCEDURE — 87210 SMEAR WET MOUNT SALINE/INK: CPT | Performed by: EMERGENCY MEDICINE

## 2017-10-25 PROCEDURE — 99285 EMERGENCY DEPT VISIT HI MDM: CPT

## 2017-10-25 PROCEDURE — 74011250636 HC RX REV CODE- 250/636: Performed by: INTERNAL MEDICINE

## 2017-10-25 PROCEDURE — 96374 THER/PROPH/DIAG INJ IV PUSH: CPT

## 2017-10-25 PROCEDURE — 71020 XR CHEST PA LAT: CPT

## 2017-10-25 PROCEDURE — 81001 URINALYSIS AUTO W/SCOPE: CPT | Performed by: EMERGENCY MEDICINE

## 2017-10-25 PROCEDURE — 93005 ELECTROCARDIOGRAM TRACING: CPT

## 2017-10-25 PROCEDURE — 36415 COLL VENOUS BLD VENIPUNCTURE: CPT | Performed by: EMERGENCY MEDICINE

## 2017-10-25 PROCEDURE — 85379 FIBRIN DEGRADATION QUANT: CPT | Performed by: EMERGENCY MEDICINE

## 2017-10-25 PROCEDURE — 80053 COMPREHEN METABOLIC PANEL: CPT | Performed by: EMERGENCY MEDICINE

## 2017-10-25 PROCEDURE — 87491 CHLMYD TRACH DNA AMP PROBE: CPT | Performed by: EMERGENCY MEDICINE

## 2017-10-25 PROCEDURE — 82550 ASSAY OF CK (CPK): CPT | Performed by: EMERGENCY MEDICINE

## 2017-10-25 RX ORDER — HALOPERIDOL 10 MG/1
10 TABLET ORAL
Status: DISCONTINUED | OUTPATIENT
Start: 2017-10-25 | End: 2017-10-25 | Stop reason: HOSPADM

## 2017-10-25 RX ORDER — SODIUM CHLORIDE 0.9 % (FLUSH) 0.9 %
5-10 SYRINGE (ML) INJECTION EVERY 8 HOURS
Status: DISCONTINUED | OUTPATIENT
Start: 2017-10-25 | End: 2017-10-25 | Stop reason: HOSPADM

## 2017-10-25 RX ORDER — BENZTROPINE MESYLATE 1 MG/1
0.5 TABLET ORAL
Status: DISCONTINUED | OUTPATIENT
Start: 2017-10-25 | End: 2017-10-25 | Stop reason: HOSPADM

## 2017-10-25 RX ORDER — ESCITALOPRAM OXALATE 10 MG/1
20 TABLET ORAL DAILY
Status: DISCONTINUED | OUTPATIENT
Start: 2017-10-25 | End: 2017-10-25 | Stop reason: HOSPADM

## 2017-10-25 RX ORDER — DIVALPROEX SODIUM 500 MG/1
500 TABLET, EXTENDED RELEASE ORAL
Status: DISCONTINUED | OUTPATIENT
Start: 2017-10-25 | End: 2017-10-25 | Stop reason: HOSPADM

## 2017-10-25 RX ORDER — METFORMIN HYDROCHLORIDE 500 MG/1
500 TABLET ORAL 2 TIMES DAILY WITH MEALS
COMMUNITY
End: 2022-01-22

## 2017-10-25 RX ORDER — BENZTROPINE MESYLATE 1 MG/1
0.5 TABLET ORAL 2 TIMES DAILY
Status: DISCONTINUED | OUTPATIENT
Start: 2017-10-25 | End: 2017-10-25

## 2017-10-25 RX ORDER — ONDANSETRON 2 MG/ML
4 INJECTION INTRAMUSCULAR; INTRAVENOUS
Status: DISCONTINUED | OUTPATIENT
Start: 2017-10-25 | End: 2017-10-25 | Stop reason: HOSPADM

## 2017-10-25 RX ORDER — IBUPROFEN 800 MG/1
800 TABLET ORAL
COMMUNITY
End: 2022-07-04

## 2017-10-25 RX ORDER — ZIPRASIDONE HYDROCHLORIDE 40 MG/1
80 CAPSULE ORAL
Status: DISCONTINUED | OUTPATIENT
Start: 2017-10-25 | End: 2017-10-25 | Stop reason: HOSPADM

## 2017-10-25 RX ORDER — SODIUM CHLORIDE 0.9 % (FLUSH) 0.9 %
5-10 SYRINGE (ML) INJECTION AS NEEDED
Status: DISCONTINUED | OUTPATIENT
Start: 2017-10-25 | End: 2017-10-25 | Stop reason: HOSPADM

## 2017-10-25 RX ORDER — LOVASTATIN 20 MG/1
40 TABLET ORAL
Status: DISCONTINUED | OUTPATIENT
Start: 2017-10-25 | End: 2017-10-25 | Stop reason: HOSPADM

## 2017-10-25 RX ORDER — METFORMIN HYDROCHLORIDE 500 MG/1
500 TABLET ORAL 2 TIMES DAILY WITH MEALS
Status: DISCONTINUED | OUTPATIENT
Start: 2017-10-25 | End: 2017-10-25 | Stop reason: HOSPADM

## 2017-10-25 RX ORDER — ASPIRIN 325 MG
325 TABLET ORAL ONCE
Status: DISCONTINUED | OUTPATIENT
Start: 2017-10-25 | End: 2017-10-25 | Stop reason: HOSPADM

## 2017-10-25 RX ORDER — ENOXAPARIN SODIUM 100 MG/ML
40 INJECTION SUBCUTANEOUS EVERY 24 HOURS
Status: DISCONTINUED | OUTPATIENT
Start: 2017-10-25 | End: 2017-10-25 | Stop reason: HOSPADM

## 2017-10-25 RX ORDER — SODIUM CHLORIDE 0.9 % (FLUSH) 0.9 %
20 SYRINGE (ML) INJECTION
Status: DISCONTINUED | OUTPATIENT
Start: 2017-10-25 | End: 2017-10-25 | Stop reason: HOSPADM

## 2017-10-25 RX ADMIN — FAMOTIDINE 20 MG: 10 INJECTION, SOLUTION INTRAVENOUS at 10:57

## 2017-10-25 RX ADMIN — METFORMIN HYDROCHLORIDE 500 MG: 500 TABLET, FILM COATED ORAL at 10:57

## 2017-10-25 RX ADMIN — ESCITALOPRAM OXALATE 20 MG: 10 TABLET ORAL at 10:50

## 2017-10-25 RX ADMIN — SODIUM CHLORIDE 1.5 ML: 9 INJECTION INTRAMUSCULAR; INTRAVENOUS; SUBCUTANEOUS at 10:03

## 2017-10-25 RX ADMIN — ENOXAPARIN SODIUM 40 MG: 40 INJECTION SUBCUTANEOUS at 10:50

## 2017-10-25 NOTE — ED NOTES
Patient delivered a breakfast tray. She as assisted with positioning in bed so that she could eat. Currently denies headache. Call bell within reach of patient.

## 2017-10-25 NOTE — PROGRESS NOTES
Chart reviewed. CM noted patient was last admitted to [facility] back in March 2017 for gallstones and was discharged home. This is the patient's third ED visit since her admission last March. Per assessment during last admission, pt was followed closely by [facility]  Maggie Christie ([phone]). CM met with patient to introduce role of CM and discuss discharge planning. Pt lives alone in a private residence in [place]. Pt is independent with ADLs and uses no DME. Patient has a supportive sister, Bennie Santamaria ([phone]). Patient stated she takes the bus for transportation. CM attempted to contact [address] [place] [address], however another CM, Juan Alberto answered. Darryl Walton provided this CM with patient's current 's contact info, Lenore Alejandro ([phone]). CM attempted to contact Kassandra Andrade and left voicemail message. Awaiting response. 1:45 PM: CM received call from Lenore Alejandro, [facility] [address] at [facility]. Kassandra Andrade confirmed she is is patient's new CM. Kassandra Andrade stated they do not have any cars available to transport patient home today and requested CM set up a medicaid cab. Kassandra Andrade confirmed that [facility] visits patient daily to administer medications and also follow-ups with a call to the patient each evening. [facility] provides transportation to and from appointments. Kassandra Andrade requested CM fax discharge papers to [phone]. CM called [facility] transportation line ([phone]) and spoke with Anjuke to arrange medicaid cab ride home. Confirmation # for transport is [id number]. [employer] stated it can take up to 3 hours for the transport to arrive. They will contact the nurse's station when they are on the way. CM received call from [employer] [place] transportation, they have out-sourced the ride to [place] to transport patient home. CM asked that nurse's station be called when they are on the way. RN is aware. Care Management Interventions  PCP Verified by CM:  Yes Ave Riddle MD)  Transition of Care Consult (CM Consult): Discharge Planning  MyChart Signup: No  Discharge Durable Medical Equipment: No  Physical Therapy Consult: No  Occupational Therapy Consult: No  Speech Therapy Consult: No  Current Support Network: Own Home, Lives Alone     ANYI Dunn/KENA

## 2017-10-25 NOTE — DISCHARGE SUMMARY
[address] [facility] 12, [address] [facility] SUMMARY       Name:  Yung Velasquez   MR#:  [id number]   :  1960   Account #:  [de-identified]        Date of Adm:  10/25/2017       DISCHARGE DIAGNOSES:   1. Atypical chest pain. 2. Anxiety disorder. 3. Bipolar disorder. 4. Diabetes. 5. Obesity. CONSULTANTS:  None. TOTAL PROCEDURE DONE: Echocardiogram.    HOSPITAL COURSE: Mrs. Erasmo Wilson is a 65-year-old patient who   was admitted on 10/25/2017 with atypical chest pain. Electrocardiogram was within normal limits. Cardiac markers, Troponin   was negative x2. An echocardiogram also was negative. We assume   that the chest pain is atypical and  does need to be done on an   outpatient basis. DISCHARGE MEDICATIONS:    1. Geodon 80 mg at night. 2. Glucophage 500 mg p.o. twice daily. 3. Mevacor 40 mg daily. 4. Ibuprofen 800 as needed. 5 Haldol 10 mg p.o. daily. 6. Lexapro 20 mg p.o. daily. 7. Benztropine is 0.5 mg at night. DIET: Diabetic diet. ACTIVITIES: As tolerated. The patient has been instructed to walk on   a daily basis. FOLLOWUP: The patient to followup with her PCP. DISPOSITION: Assisted living to last group home.         MD SINTIA Hernandez / NIMO   D:  10/25/2017   13:57   T:  10/25/2017   14:28   Job #:  [id number]

## 2017-10-25 NOTE — ED NOTES
Assumed care of patient at this time from EMS. She states she called EMS because she has \"chest tightness and pain. \" She states the pain started yesterday after she visited her psychiatrist, and clarified further to state after she ate her dinner around 2100. Patient states that the pain is intermittent, currently she does not have any. She also states \"I have some bleeding. \" She states she thinks it is vaginal bleeding  \"but I am going through menopause. \" EKG, labs completed. On CM x 3. Call bell within reach of patient. She is watching TV.

## 2017-10-25 NOTE — ED NOTES
TRANSFER - OUT REPORT:    Verbal report given to Jenny Sewell RN(name) on Abraham Garay  being transferred to [facility](unit) for routine progression of care       Report consisted of patients Situation, Background, Assessment and   Recommendations(SBAR). Information from the following report(s) SBAR, ED Summary, [facility] and Recent Results was reviewed with the receiving nurse. Lines:   Peripheral IV 10/25/17 Left Antecubital (Active)        Opportunity for questions and clarification was provided.

## 2017-10-25 NOTE — PROGRESS NOTES
Intravenous Proton Pump Inhibitor (Pantoprazole) Shortage Update    The IV pantoprazole for Keli Nguyen has been converted to IV famotidine x 1 in accordance with the [place]-Approved IV PPI Shortage Plan. IV pantoprazole will be reserved only for patients with clinically or endoscopically documented upper GI bleeding, confirmed diagnosis of H. pylori or acute erosive esophagitis in patients who are NPO. Please call the main inpatient pharmacy at [phone] with any questions.

## 2017-10-25 NOTE — ED NOTES
Reassessed patient at this time. She is laying in bed watching TV comfortably. She has complaints of a headache rated 5/10. Also states she is hungry and would like something to eat. Made MD aware of patient's pain. Patient told she was going to be admitted to the hospital for further evaluation.  Call bell remains at bedside

## 2017-10-25 NOTE — IP AVS SNAPSHOT
[address] 
[phone] Patient: Daria Osborne MRN: [id number] :1960 About your hospitalization You were admitted on:  2017 You last received care in the:  [facility] [place] [facility] OBSERVATION You were discharged on:  2017 Why you were hospitalized Your primary diagnosis was:  Not on File Your diagnoses also included:  Chest Pain Things You Need To Do (next 8 weeks) Follow up with Kalpana Dupree MD today Phone:  [phone] Where:  [address] [address] Discharge Orders None A check jamie indicates which time of day the medication should be taken. My Medications TAKE these medications as instructed Instructions Each Dose to Equal  
 Morning Noon Evening Bedtime  
 benztropine 0.5 mg tablet Commonly known as:  COGENTIN Your last dose was: Your next dose is: Take 0.5 mg by mouth nightly. 0.5 mg  
    
   
   
   
  
 divalproex  mg ER tablet Commonly known as:  DEPAKOTE ER Your last dose was: Your next dose is: Take 500 mg by mouth nightly. 500 mg  
    
   
   
   
  
 escitalopram oxalate 20 mg tablet Commonly known as:  Mynor Pedro Your last dose was: Your next dose is: Take 20 mg by mouth daily. 20 mg  
    
   
   
   
  
 haloperidol 10 mg tablet Commonly known as:  HALDOL Your last dose was: Your next dose is: Take 10 mg by mouth nightly. 10 mg  
    
   
   
   
  
 ibuprofen 800 mg tablet Commonly known as:  MOTRIN Your last dose was: Your next dose is: Take 800 mg by mouth every eight (8) hours as needed for Pain. 800 mg  
    
   
   
   
  
 lovastatin 40 mg tablet Commonly known as:  MEVACOR Your last dose was: Your next dose is: Take 40 mg by mouth nightly. 40 mg  
    
   
   
   
  
 metFORMIN 500 mg tablet Commonly known as:  GLUCOPHAGE Your last dose was: Your next dose is: Take 500 mg by mouth two (2) times daily (with meals). 500 mg POTASSIUM CHLORIDE SR 10 MEQ TAB Your last dose was: Your next dose is:    
   
   
 10 mEq daily. 10 mEq  
    
   
   
   
  
 ziprasidone 80 mg capsule Commonly known as:  Jenna Cowden Your last dose was: Your next dose is: Take 80 mg by mouth nightly. 80 mg Discharge Instructions Follow up with PCP and Psychiatric Take medication as indicated. Need to decrease weight. Pu[employer]ilo Announcement We are excited to announce that we are making your provider's discharge notes available to you in Puuilo. You will see these notes when they are completed and signed by the physician that discharged you from your recent hospital stay. If you have any questions or concerns about any information you see in Puuilo, please call the Health Information Department where you were seen or reach out to your Primary Care Provider for more information about your plan of care. Introducing [facility] & HEALTH SERVICES! [facility] introduces Puuilo patient portal. Now you can access parts of your medical record, email your doctor's office, and request medication refills online. 1. In your internet browser, go to [email or web address] 2. Click on the First Time User? Click Here link in the Sign In box. You will see the New Member Sign Up page. 3. Enter your [employer]ilo Access Code exactly as it appears below. You will not need to use this code after youve completed the sign-up process. If you do not sign up before the expiration date, you must request a new code.  
 
· Puuilo Access Code: [email or web address] 
 Expires: 11/22/2017  5:39 PM 
 
4. Enter the last four digits of your Social Security Number (xxxx) and Date of Birth (mm/dd/yyyy) as indicated and click Submit. You will be taken to the next sign-up page. 5. Create a [employer] ID. This will be your [employer] login ID and cannot be changed, so think of one that is secure and easy to remember. 6. Create a [employer] password. You can change your password at any time. 7. Enter your Password Reset Question and Answer. This can be used at a later time if you forget your password. 8. Enter your e-mail address. You will receive e-mail notification when new information is available in [address] 9. Click Sign Up. You can now view and download portions of your medical record. 10. Click the Download Summary menu link to download a portable copy of your medical information. If you have questions, please visit the Frequently Asked Questions section of the [employer] website. Remember, [employer] is NOT to be used for urgent needs. For medical emergencies, dial 911. Now available from your iPhone and Android! Unresulted Labs-Please follow up with your PCP about these lab tests Order Current Status KOH, OTHER SOURCES Preliminary result Providers Seen During Your Hospitalization Provider Specialty Primary office phone Urszula Perez MD Emergency Medicine [phone] Kody Bishop MD Internal Medicine [phone] Your Primary Care Physician (PCP) Primary Care Physician Office Phone Office Fax [address] [phone] You are allergic to the following Allergen Reactions Penicillins Rash Recent Documentation Height Weight BMI OB Status Smoking Status 1.626 m 107.5 kg 40.68 kg/m2 Menopause Never Smoker Emergency Contacts Name Discharge Info Relation Home Work Mobile Mima Causey N/A  AT THIS TIME [6] Other Relative [6] [phone] Patient Belongings The following personal items are in your possession at time of discharge: 
     Visual Aid: Glasses Please provide this summary of care documentation to your next provider. Signatures-by signing, you are acknowledging that this After Visit Summary has been reviewed with you and you have received a copy. Patient Signature:  ____________________________________________________________ Date:  ____________________________________________________________  
  
[facility] Provider Signature:  ____________________________________________________________ Date:  ____________________________________________________________

## 2017-10-25 NOTE — PROGRESS NOTES
TRANSFER - IN REPORT:    Verbal report received from yohan(name) on [facility]  being received from ed(unit) for routine progression of care      Report consisted of patients Situation, Background, Assessment and   Recommendations(SBAR). Information from the following report(s) Kardex, ED Summary, Procedure Summary, Intake/Output, MAR and Recent Results was reviewed with the receiving nurse. Opportunity for questions and clarification was provided. Assessment completed upon patients arrival to unit and care assumed.

## 2017-10-25 NOTE — H&P
[address] [place] [address]   HISTORY AND PHYSICAL       Name:  Elisa Lamar   MR#:  [id number]   :  1960   Account #:  [de-identified]        Date of Adm:  10/25/2017       CHIEF COMPLAINT: Chest pain. HISTORY OF PRESENT ILLNESS: The patient is a 21-year-old   patient with a history of bipolar disorder, anxiety disorder, as well as   diabetes, referred that she has a pain in her upper middle chest that   was graded 5/10 with no radiation and not associated with nausea and   vomiting. ALLERGIES: PENICILLIN. PAST MEDICAL HISTORY:   1. Depression. 2. Anxiety disorder. 3. Bipolar disorder. 4. Diabetes mellitus. MEDICATIONS:   1. Geodon 800 mg p.o. nightly. 2. Glucophage 500 mg daily. 3. Mevacor 40 mg p.o. daily. 4. Motrin as needed. 5. Haldol 10 mg daily. 6. Lexapro 20 mg p.o. daily. 7. Benztropine 0.5 at night. SURGICAL HISTORY: She had a tubal ligation as well as a   cholecystectomy done. TOXIC HABITS: Negative. FAMILY HISTORY: Positive for diabetes. SOCIAL HISTORY: Lives in assisted living/group home, has 2   children. REVIEW OF SYSTEMS: Not done. PHYSICAL EXAMINATION:   VITAL SIGNS: Blood pressure is 129/83, heart rate is 67, respiration is   16, temperature is 97.9. GENERAL APPEARANCE: This is a 21-year-old patient seen in the   ER, comfortable, in no acute distress. HEENT: There is no deformity. Moist oral mucosa. NECK: Supple, no JVD, no bruits. THORAX: Symmetrical expansion with reproducible pain on the chest   and the sternum area. HEART: Regular rhythm. S1, S2 present. LUNGS: Clear to auscultation. ABDOMEN: Soft. Bowel sounds present. There is no organomegaly. LIMBS: Symmetric. No edema. NEUROLOGIC: The patient is conscious and alert. LABORATORY DATA: WBC count is 6.8, hemoglobin and hematocrit   is 11.3 and 34 with platelet count 690.  Sodium is 138, potassium 4.1,   chloride is 104, CO2 is 26, BUN is 15, creatinine 0.85, glucose 136. Troponins are negative x2. ASSESSMENT:   1. Atypical chest pain. 2. Anxiety disorder. 3. Bipolar disorder. 4. Obesity. 5. Diabetes mellitus. PLAN:   1. The patient will be admitted in observation. 2. The patient already with 2 troponins that are negative. 3. Will get an echocardiogram that will be ordered in order to see if   there is any wall motion abnormality. 4. The patient can be discharged if all the above studies are negative.         Silvestre Olguin MD      ME / SHAYY PERDOMO [facility]   D:  10/25/2017   13:54   T:  10/25/2017   14:23   Job #:  [id number]

## 2017-10-25 NOTE — ED TRIAGE NOTES
\"I have shortness of breath and tightness in my chest\" reports patient. She also reports, \"I have some bleeding\" indicating vaginal bleeding. States the pain started yesterday when she got home from the psychiatrist.

## 2017-10-25 NOTE — ED NOTES
RN to bedside to chaperone pelvic exam.  No blood noted in patient's panties. No blood noted during speculum exam, large amount of thick, yellow discharge present. When questioned about the \"vaginal bleeding\" pt says \"Oh honey, I haven't bled in two years. I'm going through menopause. \"

## 2017-10-25 NOTE — ED PROVIDER NOTES
Patient is a 56 y.o. female presenting with chest pain and shortness of breath. Chest Pain (Angina)    Associated symptoms include back pain and shortness of breath. Pertinent negatives include no abdominal pain, no cough, no fever, no nausea, no palpitations and no vomiting. Shortness of Breath   Associated symptoms include chest pain. Pertinent negatives include no fever, no cough, no vomiting, no abdominal pain, no rash and no leg swelling. 55 yo AAF presents with chest tightness intermittently \"for a long time. \"  Pt had chest pain tonight, now resolved. Pain substernal, nonradiating. Denies fever, cough, nausea, vomiting, sweating, pain/swelling in legs. Associated with sob. Called ambulance tonight for vaginal bleeding and low back pain. LMP January 2016. Past Medical History:   Diagnosis Date    Diabetes ([address]     High cholesterol     Other ill-defined conditions(799.89)     hypercholesteremia    Psychiatric disorder     SCHIZOEFFECTIVE DISORDER, anxiety, bipolar    Schizoaffective disorder ([facility] [address]        Past Surgical History:   Procedure Laterality Date    HX CHOLECYSTECTOMY  03/03/2017    [facility]-Dr. Ja Schmitt    HX TUBAL LIGATION  1991         History reviewed. No pertinent family history. Social History     Social History    Marital status:      Spouse name: N/A    Number of children: N/A    Years of education: N/A     Occupational History    Not on file. Social History Main Topics    Smoking status: Never Smoker    Smokeless tobacco: Never Used    Alcohol use No    Drug use: No    Sexual activity: Not on file     Other Topics Concern    Not on file     Social History Narrative         ALLERGIES: Penicillins    Review of Systems   Constitutional: Negative for chills and fever. Respiratory: Positive for shortness of breath. Negative for cough. Cardiovascular: Positive for chest pain. Negative for palpitations and leg swelling.    Gastrointestinal: Negative for abdominal pain, diarrhea, nausea and vomiting. Genitourinary: Positive for vaginal bleeding. Negative for dysuria. Musculoskeletal: Positive for back pain. Skin: Negative for rash and wound. All other systems reviewed and are negative. Vitals:    10/25/17 0452   BP: 116/68   Pulse: 63   Resp: 14   Temp: 98 °F (36.7 °C)   SpO2: 98%   Weight: 107.5 kg (237 lb)   Height: 5' 4\" (1.626 m)            Physical Exam   Physical Examination: General appearance - alert, well appearing, and in no distress, oriented to person, place, and time and normal appearing weight  Eyes - pupils equal and reactive, extraocular eye movements intact  Neck - supple, no significant adenopathy  Chest - clear to auscultation, no wheezes, rales or rhonchi, symmetric air entry  Heart - normal rate, regular rhythm, normal S1, S2, no murmurs, rubs, clicks or gallops  Abdomen - soft, nontender, nondistended, no masses or organomegaly  Back exam - full range of motion, no tenderness, palpable spasm or pain on motion  Neurological - alert, oriented, normal speech, no focal findings or movement disorder noted  Musculoskeletal - no joint tenderness, deformity or swelling  Extremities - peripheral pulses normal, no pedal edema, no clubbing or cyanosis  Skin - normal coloration and turgor, no rashes, no suspicious skin lesions noted   EXAM:  External genitalia normal.  Pelvic exam: cervix normal, ovaries and uterus normal size and non-tender to palpation, no cervical motion tenderness or adnexal masses. No discharge.    MDM  Number of Diagnoses or Management Options  Acute chest pain:      Amount and/or Complexity of Data Reviewed  Clinical lab tests: ordered and reviewed  Tests in the radiology section of CPT®: ordered and reviewed  Decide to obtain previous medical records or to obtain history from someone other than the patient: yes  Obtain history from someone other than the patient: yes (EMS)  Review and summarize past medical records: yes  Discuss the patient with other providers: yes (hospitalist)  Independent visualization of images, tracings, or specimens: yes    Patient Progress  Patient progress: stable    ED Course       Procedures    EKG interpretation: (Preliminary)  Rhythm: sinus bradycardia; and regular . Rate (approx.): 59; Axis: left axis deviation; MD interval: normal; QRS interval: normal ; ST/T wave: non-specific changes; slight ST depression inferior leads and lateral leads    5:34 AM  Pelvic exam done, no bleeding noted. Pt now states she did not have vaginal bleeding but was sweating, thinks her apartment was too hot.     6:27 AM  Discussed with Dr. Miladis Fierro, hospitalist. Will see and admit for chest pain, r/o ACS.

## 2017-10-25 NOTE — PROGRESS NOTES
Admission Medication Reconciliation:    Information obtained from:  Records provided by patient from a recent (10/12/2017) PCP visit    Comments/Recommendations:  Ms. Luz Young last took her home medications yesterday evening. Added:  None  Removed:  None  Changed:  Benztropine from BID to qHS    Prior to Admission Medications:   Prior to Admission Medications   Prescriptions Last Dose Informant Patient Reported? Taking? POTASSIUM CHLORIDE SR 10 MEQ TAB 10/24/2017 at am  Yes Yes   Sig: 10 mEq daily. benztropine (COGENTIN) 0.5 mg tablet 10/24/2017 at pm  Yes Yes   Sig: Take 0.5 mg by mouth nightly. divalproex ER (DEPAKOTE ER) 500 mg ER tablet 10/24/2017 at pm  Yes Yes   Sig: Take 500 mg by mouth nightly. escitalopram oxalate (LEXAPRO) 20 mg tablet 10/24/2017 at am  Yes Yes   Sig: Take 20 mg by mouth daily. haloperidol (HALDOL) 10 mg tablet 10/24/2017 at pm  Yes Yes   Sig: Take 10 mg by mouth nightly. ibuprofen (MOTRIN) 800 mg tablet 10/24/2017 at Unknown time  Yes Yes   Sig: Take 800 mg by mouth every eight (8) hours as needed for Pain.   lovastatin (MEVACOR) 40 mg tablet 10/24/2017 at pm  Yes Yes   Sig: Take 40 mg by mouth nightly. metFORMIN (GLUCOPHAGE) 500 mg tablet 10/24/2017 at pm  Yes Yes   Sig: Take 500 mg by mouth two (2) times daily (with meals). ziprasidone (GEODON) 80 mg capsule 10/24/2017 at pm  Yes Yes   Sig: Take 80 mg by mouth nightly.       Facility-Administered Medications: None

## 2017-10-28 ENCOUNTER — HOSPITAL ENCOUNTER (EMERGENCY)
Age: 57
Discharge: HOME | End: 2017-10-28
Attending: EMERGENCY MEDICINE
Payer: MEDICARE

## 2017-10-28 VITALS
DIASTOLIC BLOOD PRESSURE: 68 MMHG | HEIGHT: 64 IN | BODY MASS INDEX: 40.46 KG/M2 | RESPIRATION RATE: 16 BRPM | TEMPERATURE: 98.5 F | HEART RATE: 80 BPM | OXYGEN SATURATION: 97 % | SYSTOLIC BLOOD PRESSURE: 102 MMHG | WEIGHT: 237 LBS

## 2017-10-28 DIAGNOSIS — G89.29 CHRONIC BILATERAL LOW BACK PAIN WITHOUT SCIATICA: Primary | ICD-10-CM

## 2017-10-28 DIAGNOSIS — M54.50 CHRONIC BILATERAL LOW BACK PAIN WITHOUT SCIATICA: Primary | ICD-10-CM

## 2017-10-28 LAB
ALBUMIN SERPL-MCNC: 3.4 G/DL (ref 3.5–5)
ALBUMIN/GLOB SERPL: 1 {RATIO} (ref 1.1–2.2)
ALP SERPL-CCNC: 89 U/L (ref 45–117)
ALT SERPL-CCNC: 18 U/L (ref 12–78)
ANION GAP SERPL CALC-SCNC: 9 MMOL/L (ref 5–15)
AST SERPL-CCNC: 12 U/L (ref 15–37)
BASOPHILS # BLD: 0 K/UL (ref 0–0.1)
BASOPHILS NFR BLD: 0 % (ref 0–1)
BILIRUB SERPL-MCNC: 0.4 MG/DL (ref 0.2–1)
BUN SERPL-MCNC: 15 MG/DL (ref 6–20)
BUN/CREAT SERPL: 15 (ref 12–20)
CALCIUM SERPL-MCNC: 8.2 MG/DL (ref 8.5–10.1)
CHLORIDE SERPL-SCNC: 104 MMOL/L (ref 97–108)
CO2 SERPL-SCNC: 25 MMOL/L (ref 21–32)
CREAT SERPL-MCNC: 1.02 MG/DL (ref 0.55–1.02)
DIFFERENTIAL METHOD BLD: ABNORMAL
EOSINOPHIL # BLD: 0.1 K/UL (ref 0–0.4)
EOSINOPHIL NFR BLD: 1 % (ref 0–7)
ERYTHROCYTE [DISTWIDTH] IN BLOOD BY AUTOMATED COUNT: 13.3 % (ref 11.5–14.5)
GLOBULIN SER CALC-MCNC: 3.3 G/DL (ref 2–4)
GLUCOSE SERPL-MCNC: 106 MG/DL (ref 65–100)
HCT VFR BLD AUTO: 33.7 % (ref 35–47)
HGB BLD-MCNC: 11.2 G/DL (ref 11.5–16)
LYMPHOCYTES # BLD: 2.3 K/UL (ref 0.8–3.5)
LYMPHOCYTES NFR BLD: 24 % (ref 12–49)
MCH RBC QN AUTO: 31.8 PG (ref 26–34)
MCHC RBC AUTO-ENTMCNC: 33.2 G/DL (ref 30–36.5)
MCV RBC AUTO: 95.7 FL (ref 80–99)
MONOCYTES # BLD: 0.8 K/UL (ref 0–1)
MONOCYTES NFR BLD: 8 % (ref 5–13)
NEUTS SEG # BLD: 6.2 K/UL (ref 1.8–8)
NEUTS SEG NFR BLD: 67 % (ref 32–75)
PLATELET # BLD AUTO: 188 K/UL (ref 150–400)
POTASSIUM SERPL-SCNC: 3.7 MMOL/L (ref 3.5–5.1)
PROT SERPL-MCNC: 6.7 G/DL (ref 6.4–8.2)
RBC # BLD AUTO: 3.52 M/UL (ref 3.8–5.2)
RBC MORPH BLD: ABNORMAL
RBC MORPH BLD: ABNORMAL
SODIUM SERPL-SCNC: 138 MMOL/L (ref 136–145)
WBC # BLD AUTO: 9.4 K/UL (ref 3.6–11)

## 2017-10-28 PROCEDURE — 80053 COMPREHEN METABOLIC PANEL: CPT | Performed by: EMERGENCY MEDICINE

## 2017-10-28 PROCEDURE — 85025 COMPLETE CBC W/AUTO DIFF WBC: CPT | Performed by: EMERGENCY MEDICINE

## 2017-10-28 PROCEDURE — 99283 EMERGENCY DEPT VISIT LOW MDM: CPT

## 2017-10-28 PROCEDURE — 36415 COLL VENOUS BLD VENIPUNCTURE: CPT | Performed by: EMERGENCY MEDICINE

## 2017-10-28 RX ORDER — HYDROCODONE BITARTRATE AND ACETAMINOPHEN 5; 325 MG/1; MG/1
1 TABLET ORAL
Qty: 10 TAB | Refills: 0 | Status: SHIPPED | OUTPATIENT
Start: 2017-10-28 | End: 2022-07-04

## 2017-10-28 NOTE — ED TRIAGE NOTES
TRIAGE NOTE: Patient arrives via EMS for lower back pain x1 week. Pain radiates around to abdomen. Prescribed ibuprofen by PCP. EMS reports patient was ambulatory at scene, getting up and down repeatedly from stretcher when she remembered things before leaving home. Upon arrival patient reports \"stooling herself\". Denies urinary symptoms.

## 2017-10-28 NOTE — ED PROVIDER NOTES
HPI Comments: 56 y.o. female with past medical history significant for DM, hypercholesteremia, schizoaffective disorder,anxiety and bipolar disorder who presents from [facility] via EMS with chief complaint of back pain. Per pt, she has been experiencing moderate bilateral lower back pain for the past week. She notes that she has not experienced any recent GLF, however she does have hx of a fall in the bath tube about a year ago. Per pt, she has been seen by her PCP for evaluation who prescribed 800 mg ibuprofen to treat the pt's pain. The pt notes that her pain has not been controlled with ibuprofen. She makes it known that her PCP scheduled her to be seen by an orthopedic specilist on 11/14/2017. Per pt, she was last seen in the ED three days ago10/25/2017 for evaluation of CP that was associated with N/V. At that time the pt reports that she received x-ray imaging of her chest which showed no acute process. The pt makes it known that she has been experiencing bowel incontinence today (10/28/2017) as well as a GLF which occurred while in the ED. The pt denies fever, chills, N/V/D, CP, SOB, dizziness and urinary symptoms. There are no other acute medical concerns at this time. Social hx: Non-smoker, No ETOH use  PCP: Jolie Faye MD    Note written by Mercy Willson, as dictated by Drea Sullivan MD 11:05 AM          The history is provided by the patient. No  was used. Past Medical History:   Diagnosis Date    Diabetes ([address]     High cholesterol     Other ill-defined conditions(799.89)     hypercholesteremia    Psychiatric disorder     SCHIZOAFFECTIVE DISORDER, anxiety, bipolar    Schizoaffective disorder ([address]        Past Surgical History:   Procedure Laterality Date    HX CHOLECYSTECTOMY  03/03/2017    beto andujarkjgai-RYZ-[facility]. Jag Oneill    HX TUBAL LIGATION  1991         History reviewed. No pertinent family history.     Social History     Social History    Marital status:      Spouse name: N/A    Number of children: N/A    Years of education: N/A     Occupational History    Not on file. Social History Main Topics    Smoking status: Never Smoker    Smokeless tobacco: Never Used    Alcohol use No    Drug use: No    Sexual activity: Not on file     Other Topics Concern    Not on file     Social History Narrative         ALLERGIES: Penicillins    Review of Systems   Constitutional: Negative. Negative for appetite change, chills and fever. HENT: Negative. Negative for rhinorrhea, sore throat and trouble swallowing. Eyes: Negative. Negative for photophobia. Respiratory: Negative. Negative for cough and shortness of breath. Cardiovascular: Negative. Negative for chest pain and palpitations. Gastrointestinal: Positive for diarrhea ( noted today and associated with incontinence ). Negative for abdominal pain, nausea and vomiting. Endocrine: Negative. Genitourinary: Negative. Negative for dysuria, frequency and hematuria. Musculoskeletal: Positive for back pain (ongoing to bilateral lower back over the past week. ). Negative for arthralgias. Skin: Negative. Allergic/Immunologic: Negative. Neurological: Negative. Negative for dizziness, syncope and weakness. Hematological: Negative. Psychiatric/Behavioral: Negative. Negative for behavioral problems. The patient is not nervous/anxious. All other systems reviewed and are negative. Vitals:    10/28/17 1007   BP: 102/68   Pulse: 80   Resp: 16   Temp: 98.5 °F (36.9 °C)   SpO2: 97%   Weight: 107.5 kg (237 lb)   Height: 5' 4\" (1.626 m)            Physical Exam   Constitutional: She appears well-developed and well-nourished. Morbidly obese female    HENT:   Head: Normocephalic and atraumatic. Mouth/Throat: Oropharynx is clear and moist.   Eyes: EOM are normal. Pupils are equal, round, and reactive to light. Neck: Normal range of motion. Neck supple. Cardiovascular: Normal rate, regular rhythm, normal heart sounds and intact distal pulses. Exam reveals no gallop and no friction rub. No murmur heard. Pulmonary/Chest: Effort normal. No respiratory distress. She has no wheezes. She has no rales. Abdominal: Soft. There is no tenderness. There is no rebound. Musculoskeletal: Normal range of motion. She exhibits tenderness (Mild bilateral lumbar region without CVA tenderness ). Patellar reflexes are trace bilaterally. Neurological: She is alert. No cranial nerve deficit. Motor; symmetric   Skin: No erythema. Psychiatric: She has a normal mood and affect. Her behavior is normal.   Nursing note and vitals reviewed.      Note written by Mercy Francisco, as dictated by Lindsay Rome MD 11:05 AM    [facility]  ED Course       Procedures

## 2017-10-28 NOTE — DISCHARGE INSTRUCTIONS

## 2017-10-28 NOTE — ED NOTES
Discharge instructions including follow-up appt reviewed with and given to pt by ER RN. Pt. ambulatory for discharge. Given bus pass and location of bus stop.

## 2017-10-28 NOTE — ED NOTES
Pt. states she was soiled with diarrhea, no odor noted and underwear does not appear soiled. Given wipes and ambulated to bathroom just outside of room.

## 2017-10-30 LAB
KOH PREP SPEC: NORMAL
SERVICE CMNT-IMP: NORMAL

## 2017-11-02 ENCOUNTER — HOSPITAL ENCOUNTER (EMERGENCY)
Age: 57
Discharge: HOME | End: 2017-11-02
Attending: EMERGENCY MEDICINE
Payer: MEDICARE

## 2017-11-02 VITALS
TEMPERATURE: 98 F | HEART RATE: 61 BPM | SYSTOLIC BLOOD PRESSURE: 113 MMHG | BODY MASS INDEX: 40.46 KG/M2 | WEIGHT: 237 LBS | DIASTOLIC BLOOD PRESSURE: 76 MMHG | HEIGHT: 64 IN | RESPIRATION RATE: 16 BRPM | OXYGEN SATURATION: 99 %

## 2017-11-02 DIAGNOSIS — M54.50 CHRONIC RIGHT-SIDED LOW BACK PAIN WITHOUT SCIATICA: Primary | ICD-10-CM

## 2017-11-02 DIAGNOSIS — G89.29 CHRONIC RIGHT-SIDED LOW BACK PAIN WITHOUT SCIATICA: Primary | ICD-10-CM

## 2017-11-02 PROCEDURE — 99283 EMERGENCY DEPT VISIT LOW MDM: CPT

## 2017-11-02 NOTE — DISCHARGE INSTRUCTIONS
Learning About How to Have a Healthy Back  What causes back pain? Back pain is often caused by overuse, strain, or injury. For example, people often hurt their backs playing sports or working in the yard, being jolted in a car accident, or lifting something too heavy. Aging plays a part too. Your bones and muscles tend to lose strength as you age, which makes injury more likely. The spongy discs between the bones of the spine (vertebrae) may suffer from wear and tear and no longer provide enough cushion between the bones. A disc that bulges or breaks open (herniated disc) can press on nerves, causing back pain. In some people, back pain is the result of arthritis, broken vertebrae caused by bone loss (osteoporosis), illness, or a spine problem. Although most people have back pain at one time or another, there are steps you can take to make it less likely. How can you have a healthy back? Reduce stress on your back through good posture  Slumping or slouching alone may not cause low back pain. But after the back has been strained or injured, bad posture can make pain worse. · Sleep in a position that maintains your back's normal curves and on a mattress that feels comfortable. Sleep on your side with a pillow between your knees, or sleep on your back with a pillow under your knees. These positions can reduce strain on your back. · Stand and sit up straight. \"Good posture\" generally means your ears, shoulders, and hips are in a straight line. · If you must stand for a long time, put one foot on a stool, ledge, or box. Switch feet every now and then. · Sit in a chair that is low enough to let you place both feet flat on the floor with both knees nearly level with your hips. If your chair or desk is too high, use a footrest to raise your knees. Place a small pillow, a rolled-up towel, or a lumbar roll in the curve of your back if you need extra support.   · Try a kneeling chair, which helps tilt your hips forward. This takes pressure off your lower back. · Try sitting on an exercise ball. It can rock from side to side, which helps keep your back loose. · When driving, keep your knees nearly level with your hips. Sit straight, and drive with both hands on the steering wheel. Your arms should be in a slightly bent position. Reduce stress on your back through careful lifting  · Squat down, bending at the hips and knees only. If you need to, put one knee to the floor and extend your other knee in front of you, bent at a right angle (half kneeling). · Press your chest straight forward. This helps keep your upper back straight while keeping a slight arch in your low back. · Hold the load as close to your body as possible, at the level of your belly button (navel). · Use your feet to change direction, taking small steps. · Lead with your hips as you change direction. Keep your shoulders in line with your hips as you move. · Set down your load carefully, squatting with your knees and hips only. Exercise and stretch your back  · Do some exercise on most days of the week, if your doctor says it is okay. You can walk, run, swim, or cycle. · Stretch your back muscles. Here are a few exercises to try:  Josephus Phlegm on your back, and gently pull one bent knee to your chest. Put that foot back on the floor, and then pull the other knee to your chest.  ¨ Do pelvic tilts. Lie on your back with your knees bent. Tighten your stomach muscles. Pull your belly button (navel) in and up toward your ribs. You should feel like your back is pressing to the floor and your hips and pelvis are slightly lifting off the floor. Hold for 6 seconds while breathing smoothly. ¨ Sit with your back flat against a wall. · Keep your core muscles strong. The muscles of your back, belly (abdomen), and buttocks support your spine. ¨ Pull in your belly and imagine pulling your navel toward your spine. Hold this for 6 seconds, then relax.  Remember to keep breathing normally as you tense your muscles. ¨ Do curl-ups. Always do them with your knees bent. Keep your low back on the floor, and curl your shoulders toward your knees using a smooth, slow motion. Keep your arms folded across your chest. If this bothers your neck, try putting your hands behind your neck (not your head), with your elbows spread apart. ¨ Lie on your back with your knees bent and your feet flat on the floor. Tighten your belly muscles, and then push with your feet and raise your buttocks up a few inches. Hold this position 6 seconds as you continue to breathe normally, then lower yourself slowly to the floor. Repeat 8 to 12 times. ¨ If you like group exercise, try Pilates or yoga. These classes have poses that strengthen the core muscles. Lead a healthy lifestyle  · Stay at a healthy weight to avoid strain on your back. · Do not smoke. Smoking increases the risk of osteoporosis, which weakens the spine. If you need help quitting, talk to your doctor about stop-smoking programs and medicines. These can increase your chances of quitting for good. Where can you learn more? Go to http://sterlingAequus Technologiesishaan.info/. Enter L315 in the search box to learn more about \"Learning About How to Have a Healthy Back. \"  Current as of: March 21, 2017  Content Version: 11.4  © 7712-3334 Healthwise, [employer]. Care instructions adapted under license by [employer] (which disclaims liability or warranty for this information). If you have questions about a medical condition or this instruction, always ask your healthcare professional. [place] [address] any warranty or liability for your use of this information. Learning About Relief for Back Pain  What is back tension and strain? Back strain happens when you overstretch, or pull, a muscle in your back. You may hurt your back in an accident or when you exercise or lift something.   Most back pain will get better with rest and time. You can take care of yourself at home to help your back heal.  What can you do first to relieve back pain? When you first feel back pain, try these steps:  · Walk. Take a short walk (10 to 20 minutes) on a level surface (no slopes, hills, or stairs) every 2 to 3 hours. Walk only distances you can manage without pain, especially leg pain. · Relax. Find a comfortable position for rest. Some people are comfortable on the floor or a medium-firm bed with a small pillow under their head and another under their knees. Some people prefer to lie on their side with a pillow between their knees. Don't stay in one position for too long. · Try heat or ice. Try using a heating pad on a low or medium setting, or take a warm shower, for 15 to 20 minutes every 2 to 3 hours. Or you can buy single-use heat wraps that last up to 8 hours. You can also try an ice pack for 10 to 15 minutes every 2 to 3 hours. You can use an ice pack or a bag of frozen vegetables wrapped in a thin towel. There is not strong evidence that either heat or ice will help, but you can try them to see if they help. You may also want to try switching between heat and cold. · Take pain medicine exactly as directed. ¨ If the doctor gave you a prescription medicine for pain, take it as prescribed. ¨ If you are not taking a prescription pain medicine, ask your doctor if you can take an over-the-counter medicine. What else can you do? · Stretch and exercise. Exercises that increase flexibility may relieve your pain and make it easier for your muscles to keep your spine in a good, neutral position. And don't forget to keep walking. · Do self-massage. You can use self-massage to unwind after work or school or to energize yourself in the morning. You can easily massage your feet, hands, or neck. Self-massage works best if you are in comfortable clothes and are sitting or lying in a comfortable position.  Use oil or lotion to massage bare skin.  · Reduce stress. Back pain can lead to a vicious [place]: Distress about the pain tenses the muscles in your back, which in turn causes more pain. Learn how to relax your mind and your muscles to lower your stress. Where can you learn more? Go to http://sterling-ishaan.info/. Enter A710 in the search box to learn more about \"Learning About Relief for Back Pain. \"  Current as of: March 21, 2017  Content Version: 11.4  © 9814-0372 Healthwise, [employer]. Care instructions adapted under license by [employer] (which disclaims liability or warranty for this information). If you have questions about a medical condition or this instruction, always ask your healthcare professional. [address] any warranty or liability for your use of this information.

## 2017-11-02 NOTE — ED PROVIDER NOTES
HPI Comments: 56 y.o. female with past medical history significant for diabetes, high cholesterol, and schizophrenia who presents from home via EMS with chief complaint of back pain. Patient complains of lower right back pain that radiates around to her lower abdomen bilaterally that began after picking up clothes around her apartment. She went to [facility] [place] for the same pain and was discharged after a negative work up. She was also seen here and discharged with percocet but patient says she has not picked up the prescription because she doesn't have the money. She has an appointment with edson Luna, on November 14 and was seen by her PCP on 10/26 after being in the ED. Patient started cipro at her visit for UTI. Patient says she gets UTI's often. No dysuria. There are no other acute medical concerns at this time. Social hx: Never smoker. No alcohol use. No illicit drug use. PCP: Jade Salomon MD    Note written by Mercy Madden, as dictated by Padma Linda MD 11:56 AM      The history is provided by the patient. Past Medical History:   Diagnosis Date    Diabetes ([facility] [address]     High cholesterol     Other ill-defined conditions(799.89)     hypercholesteremia    Psychiatric disorder     SCHIZOAFFECTIVE DISORDER, anxiety, bipolar    Schizoaffective disorder ([facility] [address]        Past Surgical History:   Procedure Laterality Date    HX CHOLECYSTECTOMY  03/03/2017    beto andujarjwaon-[facility]MJ[facility]. Urszula Landin    HX TUBAL LIGATION  1991         History reviewed. No pertinent family history. Social History     Social History    Marital status:      Spouse name: N/A    Number of children: N/A    Years of education: N/A     Occupational History    Not on file.      Social History Main Topics    Smoking status: Never Smoker    Smokeless tobacco: Never Used    Alcohol use No    Drug use: No    Sexual activity: Not on file     Other Topics Concern    Not on file     Social History Narrative         ALLERGIES: Penicillins    Review of Systems   Constitutional: Negative for activity change, chills and fever. HENT: Negative for nosebleeds, sore throat, trouble swallowing and voice change. Eyes: Negative for visual disturbance. Respiratory: Negative for shortness of breath. Cardiovascular: Negative for chest pain and palpitations. Gastrointestinal: Positive for abdominal pain (secondary to back pain). Negative for constipation, diarrhea and nausea. Genitourinary: Negative for difficulty urinating, dysuria, hematuria and urgency. Musculoskeletal: Positive for back pain. Negative for neck pain and neck stiffness. Skin: Negative for color change. Allergic/Immunologic: Negative for immunocompromised state. Neurological: Negative for dizziness, seizures, syncope, weakness, light-headedness, numbness and headaches. Psychiatric/Behavioral: Negative for behavioral problems, confusion, hallucinations, self-injury and suicidal ideas. All other systems reviewed and are negative. Vitals:    11/02/17 1154   BP: 113/76   Pulse: 61   Resp: 16   Temp: 98 °F (36.7 °C)   SpO2: 99%   Weight: 107.5 kg (237 lb)   Height: 5' 4\" (1.626 m)            Physical Exam   Constitutional: She is oriented to person, place, and time. She appears well-developed and well-nourished. No distress. HENT:   Head: Normocephalic and atraumatic. Eyes: Pupils are equal, round, and reactive to light. Neck: Normal range of motion. Neck supple. Cardiovascular: Normal rate, regular rhythm and normal heart sounds. Exam reveals no gallop and no friction rub. No murmur heard. Pulmonary/Chest: Effort normal and breath sounds normal. No respiratory distress. She has no wheezes. Abdominal: Soft. Bowel sounds are normal. She exhibits no distension. There is no tenderness. There is no rebound and no guarding. Musculoskeletal: Normal range of motion. Right sided L3 paraspinal back pain.  No midline tenderness. Neurological: She is alert and oriented to person, place, and time. Skin: Skin is warm. No rash noted. She is not diaphoretic. Psychiatric: She has a normal mood and affect. Her behavior is normal. Judgment and thought content normal.   Nursing note and vitals reviewed. Note written by Mercy Carver, as dictated by Jacinta Darby MD 11:56 AM    [facility]  ED Course   This is a 53-year-old female with past medical history, review of systems, physical exam as above, presenting with complaints of ongoing chronic back pain. She states she was seen by her primary care physician, [facility] emergency Department, this emergency department, she varying evaluations, ultimately sent home with pain control and muscle relaxants. States that pain is right-sided, radiating around her bilateral flanks, not associated with dysuria. Patient denies fevers, chills, nausea, vomiting, chest pain or shortness of breath. His go exam was remarkable for well-appearing obese female, in no acute distress, right-sided paraspinal tenderness to palpation, no midline tenderness, no CVA tenderness, patient ambulated without difficulty. Patient states pain is most likely 2 back strain, him from moving clothes in her bedroom. Patient with no signs of cauda equina syndrome, no risk factors for metastatic spinal disease. Suspect this represents back strain. We'll recommend she continue her therapy, follow up with orthopedics as she is scheduled to do in 2 weeks time, as well as her primary care physician.     Procedures

## 2017-12-12 ENCOUNTER — HOSPITAL ENCOUNTER (EMERGENCY)
Age: 57
Discharge: HOME | End: 2017-12-12
Attending: EMERGENCY MEDICINE
Payer: MEDICARE

## 2017-12-12 VITALS
RESPIRATION RATE: 16 BRPM | DIASTOLIC BLOOD PRESSURE: 80 MMHG | HEART RATE: 89 BPM | HEIGHT: 64 IN | WEIGHT: 232 LBS | SYSTOLIC BLOOD PRESSURE: 128 MMHG | OXYGEN SATURATION: 99 % | TEMPERATURE: 98.4 F | BODY MASS INDEX: 39.61 KG/M2

## 2017-12-12 DIAGNOSIS — N39.43 URINARY DRIBBLING: Primary | ICD-10-CM

## 2017-12-12 LAB
APPEARANCE UR: ABNORMAL
BACTERIA URNS QL MICRO: ABNORMAL /HPF
BILIRUB UR QL: NEGATIVE
COLOR UR: ABNORMAL
EPITH CASTS URNS QL MICRO: ABNORMAL /LPF
GLUCOSE UR STRIP.AUTO-MCNC: NEGATIVE MG/DL
HCG UR QL: NEGATIVE
HCG UR QL: NEGATIVE
HGB UR QL STRIP: NEGATIVE
KETONES UR QL STRIP.AUTO: NEGATIVE MG/DL
LEUKOCYTE ESTERASE UR QL STRIP.AUTO: ABNORMAL
MUCOUS THREADS URNS QL MICRO: ABNORMAL /LPF
NITRITE UR QL STRIP.AUTO: NEGATIVE
PH UR STRIP: 5.5 [PH] (ref 5–8)
PROT UR STRIP-MCNC: NEGATIVE MG/DL
RBC #/AREA URNS HPF: ABNORMAL /HPF (ref 0–5)
SP GR UR REFRACTOMETRY: 1.03 (ref 1–1.03)
UROBILINOGEN UR QL STRIP.AUTO: 0.2 EU/DL (ref 0.2–1)
WBC URNS QL MICRO: ABNORMAL /HPF (ref 0–4)

## 2017-12-12 PROCEDURE — 81001 URINALYSIS AUTO W/SCOPE: CPT | Performed by: EMERGENCY MEDICINE

## 2017-12-12 PROCEDURE — 81025 URINE PREGNANCY TEST: CPT

## 2017-12-12 PROCEDURE — 90791 PSYCH DIAGNOSTIC EVALUATION: CPT

## 2017-12-12 PROCEDURE — 99285 EMERGENCY DEPT VISIT HI MDM: CPT

## 2017-12-12 NOTE — ED PROVIDER NOTES
HPI Comments: 56 y.o. female with past medical history significant for DM, schizoaffective disorder, anxiety, bipolar, and hypercholesteremia who presents from [facility] via EMS with chief complaint of abd pain. Pt went to [facility] this morning where she told Dr. Meliton Candelaria that she was having back and abd pain. She was then sent to the ED for further evaluation and now reports that her \"water broke this morning and soaked her jeans from her pelvis down to her ankles. \" She states that she is experiencing abd pain because she is in labor. She reports that she has been pregnant for the past 2 years and has been seeing her OB every 2 weeks. She states that her LMP was January 2016; however, she then reported that she had a menstrual period last month with \"blood clots. \" Pt reports hx of DM. Pt denies any nausea, vomiting, fever, or chills. There are no other acute medical concerns at this time. Social hx: (-) tobacco use; (-) EtOH use  PCP: Derick Talavera MD  OB: Rosy Lucas. MD Johnathon    Note written by Mercy Robertson, as dictated by Marylin La MD 12:07 PM    The history is provided by the patient. No  was used. Past Medical History:   Diagnosis Date    Diabetes ([facility]     High cholesterol     Other ill-defined conditions(799.89)     hypercholesteremia    Psychiatric disorder     SCHIZOAFFECTIVE DISORDER, anxiety, bipolar    Schizoaffective disorder ([facility]        Past Surgical History:   Procedure Laterality Date    HX CHOLECYSTECTOMY  03/03/2017    [facility] ALEXANDREA Yuan    HX TUBAL LIGATION  1991         No family history on file. Social History     Social History    Marital status:      Spouse name: N/A    Number of children: N/A    Years of education: N/A     Occupational History    Not on file.      Social History Main Topics    Smoking status: Never Smoker    Smokeless tobacco: Never Used    Alcohol use No    Drug use: No    Sexual activity: Not on file     Other Topics Concern    Not on file     Social History Narrative         ALLERGIES: Penicillins    Review of Systems   Constitutional: Negative for chills and fever. Respiratory: Negative for shortness of breath. Cardiovascular: Negative for chest pain. Gastrointestinal: Positive for abdominal pain. Negative for nausea and vomiting. Genitourinary: Negative for decreased urine volume, dysuria, pelvic pain, vaginal bleeding and vaginal pain. All other systems reviewed and are negative. Vitals:    12/12/17 1228   BP: 130/75   Pulse: 63   Resp: 18   Temp: 98.5 °F (36.9 °C)   SpO2: 98%   Weight: 105.2 kg (232 lb)   Height: 5' 4\" (1.626 m)            Physical Exam   Constitutional: She is oriented to person, place, and time. She appears well-developed and well-nourished. NAD, AxOx4, speaking in complete but rambling sentences     HENT:   Head: Normocephalic and atraumatic. Nose: Nose normal.   Mouth/Throat: Oropharynx is clear and moist.   Eyes: Conjunctivae and EOM are normal. Pupils are equal, round, and reactive to light. Right eye exhibits no discharge. Left eye exhibits no discharge. No scleral icterus. Neck: Normal range of motion. Neck supple. No JVD present. No tracheal deviation present. Cardiovascular: Normal rate, regular rhythm, normal heart sounds and intact distal pulses. Exam reveals no gallop and no friction rub. No murmur heard. Pulmonary/Chest: Effort normal and breath sounds normal. No respiratory distress. She has no wheezes. She has no rales. She exhibits no tenderness. Abdominal: Soft. Bowel sounds are normal. There is no tenderness. There is no rebound and no guarding. Obese, nttp   Genitourinary: No vaginal discharge found. Genitourinary Comments: Pt denies urinary complaints   Musculoskeletal: Normal range of motion. She exhibits no edema, tenderness or deformity.    Neurological: She is alert and oriented to person, place, and time. She has normal reflexes. No cranial nerve deficit. She exhibits normal muscle tone. Coordination normal.   pt has motor/ CV/ Sensation grossly intact to all extremities, R = L in strength;   Skin: Skin is warm and dry. No rash noted. No erythema. No pallor. Psychiatric: She has a normal mood and affect. Her behavior is normal. Thought content normal.   Denies si/ hi   Nursing note and vitals reviewed. [facility]  ED Course       Procedures         1:47 PM BSMART/ Psychiatry has evaluated the Pt and have deemed the pt safe for discharge. They have also arranged close follow-up/ additional support as needed. Rohini Bullock  results have been reviewed with her. She has been counseled regarding her diagnosis. She verbally conveys understanding and agreement of the signs, symptoms, diagnosis, treatment and prognosis and additionally agrees to Call/ Arrange follow up as recommended with Dr. Wolfgang Perea MD in 24 - 48 hours. She also agrees with the care-plan and conveys that all of her questions have been answered. I have also put together some discharge instructions for her that include: 1) educational information regarding their diagnosis, 2) how to care for their diagnosis at home, as well a 3) list of reasons why they would want to return to the ED prior to their follow-up appointment, should their condition change or for concerns.

## 2017-12-12 NOTE — PROGRESS NOTES
Patient states she does not have any family to pick her up. I asked about the lady that is listed as her emergency contact. She asked if I could call [employer] I said I can but it will phyllis them up to 4 hrs to pick her up. She said just give me a bus ticket I can get home with that. So she was given a bus ticket.   Dhaval Kauffman RN CRM

## 2017-12-12 NOTE — DISCHARGE INSTRUCTIONS
Medicine for Schizophrenia: Care Instructions  Your Care Instructions    Medicine is the best treatment for schizophrenia. But it can be hard to take the medicine. This may be because:  · You have severe side effects. · You don't believe you are ill. · You feel better. You may think you no longer need medicine. · You forget to take your medicine. This might be because of confused thinking or depression. · You have a drug or alcohol problem that gets in the way. · You don't want to be reminded that you have a mental health problem. Taking medicine every day reminds you. But if you stop taking your medicine, you probably will have a relapse. A relapse means your symptoms return or get worse after you have been feeling better. As long as you are taking medicines, you will need to see your doctor on a regular basis. You may need to go to a hospital while you are changing or stopping medicines. Follow-up care is a key part of your treatment and safety. Be sure to make and go to all appointments, and call your doctor if you are having problems. It's also a good idea to know your test results and keep a list of the medicines you take. What medicines are used for schizophrenia? Many types of medicines can help you. It might be best to use more than one, but it may take time to find which medicines work well for you. This may be frustrating. But your doctor and family can support you during this time. Medicines used most often include:  · First-generation antipsychotics. Examples are chlorpromazine, haloperidol (Haldol), and perphenazine. They are used to reduce anxiety and agitation. They also keep you from hearing or seeing things that aren't there (hallucinations) and from believing things that aren't true (delusions). · Second-generation antipsychotics. Examples are aripiprazole (Abilify) and risperidone (Risperdal).  These medicines keep you from hearing or seeing things that aren't there (hallucinations) and from believing things that aren't true (delusions). They also help the negative symptoms, like not caring about things or finding it hard to say how you feel. These medicines may have fewer side effects than first-generation medicines. These medicines sometimes have severe side effects. Always talk to your doctor about how they are working and how you are feeling. If you feel that a medicine isn't right for you, your doctor can help you find a new one. Don't stop taking your medicines unless you talk to your doctor. How can you care for yourself at home? Take your medicine  · Be safe with medicines. Take your medicines exactly as prescribed. Call your doctor if you think you are having a problem with your medicine. · If you are having trouble taking your medicines or feel you don't need to take them, talk to your doctor. Your doctor may be able to change the medicine or the amount you take. Ask about long-acting medicines  · Ask your doctor about long-acting medicines that are injected (shots). You get a shot every week or every few weeks. This may be a good choice because:  ¨ You have a set day and time to get the shot. ¨ If you don't show up for your shot, your doctor knows right away. ¨ The medicine stays in your body longer. If you are a little late for a shot, you have more time to get help before your symptoms return. ¨ You are not reminded every day that you have a mental health problem. ¨ You don't have to carry pills with you. Have a routine  · Make a schedule for taking your medicines. Follow it every day. · Identify things you do every day at the same time, such as brushing your teeth. Use these activities to help remind you to take your medicines. · Set your watch alarm or a kitchen timer to remind you when to take your medicines. Or ask a family member to help you remember to take your medicines.   · Keep the numbers for these national suicide hotlines: 6-503-174-TALK (6-055-534-765-328-3714) and 6-182-TZHPIIW (8-497.833.8738). If you or someone you know talks about suicide or about feeling hopeless, get help right away. Use a pillbox  · Use a plastic pillbox with dividers for each day's medicines. It can have a few or many compartments. Some have timers you can program. Choose one that fits your needs. · Put your pillbox in a place where it will remind you to take your medicines. For example, if you need to take medicine 3 times a day with meals, put those medicines in a pillbox near where you eat. · Keep one pill in its original bottle. Then if you forget what a pill is for, you can find the bottle it came from. When should you call for help? Call 911 anytime you think you may need emergency care. For example, call if:  ? · You are thinking about suicide or are threatening suicide. ? · You feel you cannot stop from hurting yourself or someone else. ? · You hear voices that tell you to hurt yourself or someone else or to do something illegal, such as destroy property or steal.   ?Call your doctor now or seek immediate medical care if:  ? · You show warning signs of suicide, such as talking about death or spending long periods of time alone. ? · You hear voices. ? · You think someone is trying to harm you. ? · You cannot concentrate or are easily confused. ? · You are drinking a lot of alcohol or using illegal drugs. ? · You have a hard time taking care of basic needs, such as grooming. ? · You have signs of neuroleptic malignant syndrome, a side effect of a medicine you may be taking. Signs include:  ¨ A fever of 102°F to 103°F.  ¨ A fast or irregular heartbeat. ¨ Rapid breathing. ¨ Severe sweating. ? · You have signs of tardive dyskinesia, a side effect of a medicine you may be taking. These include:  ¨ Lip-smacking or continuous chewing. ¨ Tongue-twitching or thrusting the tongue out of the mouth. ¨ Quick and jerky movements (tics) of the head. ? Watch closely for changes in your health, and be sure to contact your doctor if:  ? · Your symptoms come back or are getting worse after you have been getting better. ? · You cannot go to your counseling sessions. ? · You are not taking your medicines or you are thinking about not taking them. Where can you learn more? Go to http://sterling-ishaan.info/. Enter U376 in the search box to learn more about \"Medicine for Schizophrenia: Care Instructions. \"  Current as of: May 12, 2017  Content Version: 11.4  © 2296-7694 [employer]. Care instructions adapted under license by [employer] (which disclaims liability or warranty for this information). If you have questions about a medical condition or this instruction, always ask your healthcare professional. [address] any warranty or liability for your use of this information. We hope that we have addressed all of your medical concerns. The examination and treatment you received in the Emergency Department were for an emergent problem and were not intended as complete care. It is important that you follow up with your healthcare provider(s) for ongoing care. If your symptoms worsen or do not improve as expected, and you are unable to reach your usual health care provider(s), you should return to the Emergency Department. Today's healthcare is undergoing tremendous change, and patient satisfaction surveys are one of the many tools to assess the quality of medical care. You may receive a survey from the [employer] regarding your experience in the Emergency Department. I hope that your experience has been completely positive, particularly the medical care that I provided. As such, please participate in the survey; anything less than excellent does not meet my expectations or intentions.         [address] [facility] and 8 [facility] participate in nationally recognized quality of care measures. If your blood pressure is greater than 120/80, as reported below, we urge that you seek medical care to address the potential of high blood pressure, commonly known as hypertension. Hypertension can be hereditary or can be caused by certain medical conditions, pain, stress, or \"white coat syndrome. \"       Please make an appointment with your health care provider(s) for follow up of your Emergency Department visit. VITALS:   Patient Vitals for the past 8 hrs:   Temp Pulse Resp BP SpO2   12/12/17 1228 98.5 °F (36.9 °C) 63 18 130/75 98 %          Thank you for allowing us to provide you with medical care today. We realize that you have many choices for your emergency care needs. Please choose us in the future for any continued health care needs. Nakul Martins 78 [address] [facility].   Office: [phone]            Recent Results (from the past 24 hour(s))   HCG URINE, QL. - POC    Collection Time: 12/12/17 12:03 PM   Result Value Ref Range    Pregnancy test,urine (POC) NEGATIVE  NEG     URINALYSIS W/MICROSCOPIC    Collection Time: 12/12/17  1:12 PM   Result Value Ref Range    Color YELLOW/STRAW      Appearance TURBID (A) CLEAR      Specific gravity 1.029 1.003 - 1.030      pH (UA) 5.5 5.0 - 8.0      Protein NEGATIVE  NEG mg/dL    Glucose NEGATIVE  NEG mg/dL    Ketone NEGATIVE  NEG mg/dL    Bilirubin NEGATIVE  NEG      Blood NEGATIVE  NEG      Urobilinogen 0.2 0.2 - 1.0 EU/dL    Nitrites NEGATIVE  NEG      Leukocyte Esterase MODERATE (A) NEG      WBC PENDING /hpf    RBC PENDING /hpf    Epithelial cells PENDING /lpf    Bacteria PENDING /hpf       No results found.

## 2017-12-12 NOTE — ED NOTES
Patient verbalizes understanding of discharge instructions. Ambulatory and in no acute distress at discharge. Provided patient with a bus ticket.

## 2017-12-12 NOTE — ED TRIAGE NOTES
Triage: [facility] told Dr. Kimberley Tobar she had back pain and abdominal pain like pain. She reports her water broke this morning. Originally sent to L&D but performed POC preg in ER and it was NEGATIVE. Patient has a book of notes regarding the infant needs from the grocery store.

## 2017-12-12 NOTE — BSMART NOTE
Comprehensive Assessment Form Part 1      Section I - Disposition    Axis I - Schizophrenia   Axis II - Deferred  Axis III -   Past Medical History:   Diagnosis Date    Diabetes ([facility] [address]     High cholesterol     Other ill-defined conditions(799.89)     hypercholesteremia    Psychiatric disorder     SCHIZOAFFECTIVE DISORDER, anxiety, bipolar    Schizoaffective disorder ([facility] [address]        Axis IV - Limited support  [place] V - 40      The Medical Doctor to Psychiatrist conference was not completed. The Medical Doctor is in agreement with Psychiatrist disposition because of (reason) Patient does not require psychiatric admission. The plan is discharge and follow up with PACT team.  They see her daily. The on-call Psychiatrist consulted was Dr. Giancarlo Sandra. The admitting Psychiatrist will be Dr. Adalid Garcia. The admitting Diagnosis is NA. The Payor source is  [facility] Medicare    Section II - Integrated Summary  Summary:  Patient came in from [facility] reporting pain from pregnancy. Patient indicated she was here for contractions that started today because she has been pregnant for 2 years. Patient has a history of being diagnosed Schizophrenic and has been admitted at [facility], [facility], and [place] [address]. Patient reported \"I was there for nothing. \"  Patient does not feel she is Schizophrenic but reported she is anxious and Bipolar. Patient is a current [facility] PACT patient and they see her daily. SPoke with Van Gallo and she indicated she is sometimes paranoid but that has not been a chronic belief of patients. Patient is alert, oriented, and cooperative. Patient denied any SI, HI, or hallucinations. Patient does not want to be \"locked up\" in a psychiatric unit and does not require admission. Patient  will follow up with PACT [facility]. The patienthas demonstrated mental capacity to provide informed consent. The information is given by the patient, past medical records and [facility]. The Chief Complaint is pregnant.   The Precipitant Factors are delusional thoughts. Previous Hospitalizations: Yes  The patient has been in restraints in the past and has not escaped from them. Current Psychiatrist and/or  is [facility] PACT. Lethality Assessment:    The potential for suicide noted by the following: Patient denied SI . The potential for homicide is not noted. The patient has not been a perpetrator of sexual or physical abuse. There are not pending charges. The patient is not felt to be at risk for self harm or harm to others. The attending nurse was advised that security has not been notified. Section III - Psychosocial  The patient's overall mood and attitude is WNL \"good\". Feelings of helplessness and hopelessness are not observed. Generalized anxiety is not observed. Panic is not observed. Phobias are not observed. Obsessive compulsive tendencies are not observed. Section IV - Mental Status Exam  The patient's appearance shows no evidence of impairment. The patient's behavior shows no evidence of impairment. The patient is oriented to time, place, person and situation. The patient's speech shows no evidence of impairment. The patient's mood is euthymic. The range of affect shows no evidence of impairment. The patient's thought content demonstrates delusions. The thought process shows no evidence of impairment. The patient's perception shows no evidence of impairment. The patient's memory shows no evidence of impairment. The patient's appetite shows no evidence of impairment. The patient's sleep shows no evidence of impairment. The patient shows no insight. The patient's judgement is psychologically impaired. Section V - Substance Abuse  The patient is not using substances. Section VI - Living Arrangements  The patient is single. The patient lives alone. The patient has 2 children ages 29, 39. The patient does plan to return home upon discharge. The patient does not have legal issues pending.  The patient's source of income comes from disability. Episcopal and cultural practices have not been voiced at this time. The patient's greatest support comes from sister and this person will not be involved with the treatment. The patient has not been in an event described as horrible or outside the realm of ordinary life experience either currently or in the past.  The patient has not been a victim of sexual/physical abuse. Section VII - Other Areas of Clinical Concern  The highest grade achieved is 12th with the overall quality of school experience being described as NA. The patient is currently disabled and speaks [place] as a primary language. The patient has no communication impairments affecting communication. The patient's preference for learning can be described as: can read and write adequately. The patient's hearing is normal.  The patient's vision is impaired and  wears glasses or contacts.       Sarah Ragsdale, FREDDY

## 2017-12-12 NOTE — ED NOTES
Pt states that pains in lower back and lower abdomen that started today. Was doing housework when this started.

## 2018-04-28 NOTE — ROUTINE PROCESS
TRANSFER - IN REPORT:    Verbal report received from Greg RN(name) on Betsy Sánchez  being received from [facility]) for routine post - op      Report consisted of patients Situation, Background, Assessment and   Recommendations(SBAR). Information from the following report(s) SBAR was reviewed with the receiving nurse. Opportunity for questions and clarification was provided. Assessment completed upon patients arrival to unit and care assumed. abd pain, 5 days postpartum/fever/cough

## 2018-05-21 ENCOUNTER — HOSPITAL ENCOUNTER (EMERGENCY)
Age: 58
Discharge: HOME | End: 2018-05-21
Attending: STUDENT IN AN ORGANIZED HEALTH CARE EDUCATION/TRAINING PROGRAM
Payer: MEDICARE

## 2018-05-21 VITALS
SYSTOLIC BLOOD PRESSURE: 116 MMHG | OXYGEN SATURATION: 97 % | TEMPERATURE: 98.2 F | RESPIRATION RATE: 18 BRPM | BODY MASS INDEX: 39.61 KG/M2 | HEIGHT: 64 IN | DIASTOLIC BLOOD PRESSURE: 41 MMHG | WEIGHT: 232 LBS

## 2018-05-21 DIAGNOSIS — L02.91 ABSCESS: Primary | ICD-10-CM

## 2018-05-21 DIAGNOSIS — M54.5 LOW BACK PAIN, UNSPECIFIED BACK PAIN LATERALITY, UNSPECIFIED CHRONICITY, WITH SCIATICA PRESENCE UNSPECIFIED: ICD-10-CM

## 2018-05-21 LAB
ALBUMIN SERPL-MCNC: 3.1 G/DL (ref 3.5–5)
ALBUMIN/GLOB SERPL: 0.8 {RATIO} (ref 1.1–2.2)
ALP SERPL-CCNC: 93 U/L (ref 45–117)
ALT SERPL-CCNC: 26 U/L (ref 12–78)
ANION GAP SERPL CALC-SCNC: 9 MMOL/L (ref 5–15)
APPEARANCE UR: ABNORMAL
AST SERPL-CCNC: 17 U/L (ref 15–37)
ATRIAL RATE: 68 BPM
BACTERIA URNS QL MICRO: ABNORMAL /HPF
BASOPHILS # BLD: 0 K/UL (ref 0–0.1)
BASOPHILS NFR BLD: 1 % (ref 0–1)
BILIRUB SERPL-MCNC: 0.2 MG/DL (ref 0.2–1)
BILIRUB UR QL: NEGATIVE
BUN SERPL-MCNC: 14 MG/DL (ref 6–20)
BUN/CREAT SERPL: 19 (ref 12–20)
CALCIUM SERPL-MCNC: 8.6 MG/DL (ref 8.5–10.1)
CALCULATED P AXIS, ECG09: 41 DEGREES
CALCULATED R AXIS, ECG10: -24 DEGREES
CALCULATED T AXIS, ECG11: -5 DEGREES
CHLORIDE SERPL-SCNC: 103 MMOL/L (ref 97–108)
CO2 SERPL-SCNC: 28 MMOL/L (ref 21–32)
COLOR UR: ABNORMAL
CREAT SERPL-MCNC: 0.72 MG/DL (ref 0.55–1.02)
DIAGNOSIS, 93000: NORMAL
DIFFERENTIAL METHOD BLD: ABNORMAL
EOSINOPHIL # BLD: 0.1 K/UL (ref 0–0.4)
EOSINOPHIL NFR BLD: 1 % (ref 0–7)
EPITH CASTS URNS QL MICRO: ABNORMAL /LPF
ERYTHROCYTE [DISTWIDTH] IN BLOOD BY AUTOMATED COUNT: 13 % (ref 11.5–14.5)
GLOBULIN SER CALC-MCNC: 3.9 G/DL (ref 2–4)
GLUCOSE SERPL-MCNC: 157 MG/DL (ref 65–100)
GLUCOSE UR STRIP.AUTO-MCNC: NEGATIVE MG/DL
HCT VFR BLD AUTO: 36 % (ref 35–47)
HGB BLD-MCNC: 11.8 G/DL (ref 11.5–16)
HGB UR QL STRIP: ABNORMAL
IMM GRANULOCYTES # BLD: 0.1 K/UL (ref 0–0.04)
IMM GRANULOCYTES NFR BLD AUTO: 1 % (ref 0–0.5)
KETONES UR QL STRIP.AUTO: ABNORMAL MG/DL
LEUKOCYTE ESTERASE UR QL STRIP.AUTO: ABNORMAL
LYMPHOCYTES # BLD: 3.5 K/UL (ref 0.8–3.5)
LYMPHOCYTES NFR BLD: 45 % (ref 12–49)
MCH RBC QN AUTO: 32.2 PG (ref 26–34)
MCHC RBC AUTO-ENTMCNC: 32.8 G/DL (ref 30–36.5)
MCV RBC AUTO: 98.4 FL (ref 80–99)
MONOCYTES # BLD: 0.6 K/UL (ref 0–1)
MONOCYTES NFR BLD: 8 % (ref 5–13)
NEUTS SEG # BLD: 3.4 K/UL (ref 1.8–8)
NEUTS SEG NFR BLD: 44 % (ref 32–75)
NITRITE UR QL STRIP.AUTO: NEGATIVE
NRBC # BLD: 0 K/UL (ref 0–0.01)
NRBC BLD-RTO: 0 PER 100 WBC
P-R INTERVAL, ECG05: 118 MS
PH UR STRIP: 5.5 [PH] (ref 5–8)
PLATELET # BLD AUTO: 194 K/UL (ref 150–400)
PMV BLD AUTO: 10.3 FL (ref 8.9–12.9)
POTASSIUM SERPL-SCNC: 4 MMOL/L (ref 3.5–5.1)
PROT SERPL-MCNC: 7 G/DL (ref 6.4–8.2)
PROT UR STRIP-MCNC: NEGATIVE MG/DL
Q-T INTERVAL, ECG07: 432 MS
QRS DURATION, ECG06: 88 MS
QTC CALCULATION (BEZET), ECG08: 459 MS
RBC # BLD AUTO: 3.66 M/UL (ref 3.8–5.2)
RBC #/AREA URNS HPF: ABNORMAL /HPF (ref 0–5)
SODIUM SERPL-SCNC: 140 MMOL/L (ref 136–145)
SP GR UR REFRACTOMETRY: 1.02 (ref 1–1.03)
TROPONIN I SERPL-MCNC: <0.04 NG/ML
UR CULT HOLD, URHOLD: NORMAL
UROBILINOGEN UR QL STRIP.AUTO: 0.2 EU/DL (ref 0.2–1)
VENTRICULAR RATE, ECG03: 68 BPM
WBC # BLD AUTO: 7.8 K/UL (ref 3.6–11)
WBC URNS QL MICRO: ABNORMAL /HPF (ref 0–4)

## 2018-05-21 PROCEDURE — 84484 ASSAY OF TROPONIN QUANT: CPT | Performed by: STUDENT IN AN ORGANIZED HEALTH CARE EDUCATION/TRAINING PROGRAM

## 2018-05-21 PROCEDURE — 85025 COMPLETE CBC W/AUTO DIFF WBC: CPT | Performed by: STUDENT IN AN ORGANIZED HEALTH CARE EDUCATION/TRAINING PROGRAM

## 2018-05-21 PROCEDURE — 36415 COLL VENOUS BLD VENIPUNCTURE: CPT | Performed by: STUDENT IN AN ORGANIZED HEALTH CARE EDUCATION/TRAINING PROGRAM

## 2018-05-21 PROCEDURE — 80053 COMPREHEN METABOLIC PANEL: CPT | Performed by: STUDENT IN AN ORGANIZED HEALTH CARE EDUCATION/TRAINING PROGRAM

## 2018-05-21 PROCEDURE — 93005 ELECTROCARDIOGRAM TRACING: CPT

## 2018-05-21 PROCEDURE — 87086 URINE CULTURE/COLONY COUNT: CPT | Performed by: STUDENT IN AN ORGANIZED HEALTH CARE EDUCATION/TRAINING PROGRAM

## 2018-05-21 PROCEDURE — 96374 THER/PROPH/DIAG INJ IV PUSH: CPT

## 2018-05-21 PROCEDURE — 81001 URINALYSIS AUTO W/SCOPE: CPT | Performed by: EMERGENCY MEDICINE

## 2018-05-21 PROCEDURE — 74011250636 HC RX REV CODE- 250/636: Performed by: STUDENT IN AN ORGANIZED HEALTH CARE EDUCATION/TRAINING PROGRAM

## 2018-05-21 PROCEDURE — 99284 EMERGENCY DEPT VISIT MOD MDM: CPT

## 2018-05-21 PROCEDURE — 75810000289 HC I&D ABSCESS SIMP/COMP/MULT

## 2018-05-21 PROCEDURE — 74011000250 HC RX REV CODE- 250: Performed by: STUDENT IN AN ORGANIZED HEALTH CARE EDUCATION/TRAINING PROGRAM

## 2018-05-21 RX ORDER — SULFAMETHOXAZOLE AND TRIMETHOPRIM 800; 160 MG/1; MG/1
2 TABLET ORAL 2 TIMES DAILY
Qty: 20 TAB | Refills: 0 | Status: SHIPPED | OUTPATIENT
Start: 2018-05-21 | End: 2018-05-26

## 2018-05-21 RX ORDER — LIDOCAINE HYDROCHLORIDE AND EPINEPHRINE 10; 10 MG/ML; UG/ML
1.5 INJECTION, SOLUTION INFILTRATION; PERINEURAL
Status: COMPLETED | OUTPATIENT
Start: 2018-05-21 | End: 2018-05-21

## 2018-05-21 RX ORDER — LIDOCAINE 50 MG/G
PATCH TOPICAL
Qty: 6 EACH | Refills: 0 | Status: SHIPPED | OUTPATIENT
Start: 2018-05-21 | End: 2022-07-04

## 2018-05-21 RX ORDER — KETOROLAC TROMETHAMINE 30 MG/ML
15 INJECTION, SOLUTION INTRAMUSCULAR; INTRAVENOUS ONCE
Status: COMPLETED | OUTPATIENT
Start: 2018-05-21 | End: 2018-05-21

## 2018-05-21 RX ADMIN — KETOROLAC TROMETHAMINE 15 MG: 30 INJECTION, SOLUTION INTRAMUSCULAR at 14:35

## 2018-05-21 RX ADMIN — LIDOCAINE HYDROCHLORIDE,EPINEPHRINE BITARTRATE 15 MG: 10; .01 INJECTION, SOLUTION INFILTRATION; PERINEURAL at 14:35

## 2018-05-21 NOTE — PROGRESS NOTES
Received call from charge nurse and pt has been discharged and has returned from outpatient pharmacy to ED waiting area awaiting her [address] cab ride home. She reported to the ED charge nurse that she couldn't pay for her meds, Bactrim $1.25 and Lydocaine patch $28.  She advised that if we couldn't assist, \"I will check back in here\". CM advised nurse that CM dept can cover antibiotics $1.25 bactrim but not pain management, lydocaine (prescribed for muscle pain). Paperwork completed for Bactrim, $1.25 and confirmed with outpatient pharmacist this CM would complete paperwork.   TAY Gomez

## 2018-05-21 NOTE — ED NOTES
Patient has received discharge instructions from ER MD, verbalizes understanding. Dressing applied to RIGHT shoulder wound/abscess. Patient ambulatory upon discharge to waiting room for [facility] healthkeepers cab ride.   ETA by 1900, confirmation # [id number]

## 2018-05-21 NOTE — DISCHARGE INSTRUCTIONS
Skin Abscess: Care Instructions  Your Care Instructions    A skin abscess is a bacterial infection that forms a pocket of pus. A boil is a kind of skin abscess. The doctor may have cut an opening in the abscess so that the pus can drain out. You may have gauze in the cut so that the abscess will stay open and keep draining. You may need antibiotics. You will need to follow up with your doctor to make sure the infection has gone away. The doctor has checked you carefully, but problems can develop later. If you notice any problems or new symptoms, get medical treatment right away. Follow-up care is a key part of your treatment and safety. Be sure to make and go to all appointments, and call your doctor if you are having problems. It's also a good idea to know your test results and keep a list of the medicines you take. How can you care for yourself at home? · Apply warm and dry compresses, a heating pad set on low, or a hot water bottle 3 or 4 times a day for pain. Keep a cloth between the heat source and your skin. · If your doctor prescribed antibiotics, take them as directed. Do not stop taking them just because you feel better. You need to take the full course of antibiotics. · Take pain medicines exactly as directed. ¨ If the doctor gave you a prescription medicine for pain, take it as prescribed. ¨ If you are not taking a prescription pain medicine, ask your doctor if you can take an over-the-counter medicine. · Keep your bandage clean and dry. Change the bandage whenever it gets wet or dirty, or at least one time a day. · If the abscess was packed with gauze:  ¨ Keep follow-up appointments to have the gauze changed or removed. If the doctor instructed you to remove the gauze, gently pull out all of the gauze when your doctor tells you to. ¨ After the gauze is removed, soak the area in warm water for 15 to 20 minutes 2 times a day, until the wound closes. When should you call for help?   Call your doctor now or seek immediate medical care if:  ? · You have signs of worsening infection, such as:  ¨ Increased pain, swelling, warmth, or redness. ¨ Red streaks leading from the infected skin. ¨ Pus draining from the wound. ¨ A fever. ? Watch closely for changes in your health, and be sure to contact your doctor if:  ? · You do not get better as expected. Where can you learn more? Go to http://sterling-ishaan.info/. Enter C803 in the search box to learn more about \"Skin Abscess: Care Instructions. \"  Current as of: October 13, 2016  Content Version: 11.4  © 3707-3805 [employer]. Care instructions adapted under license by [email or web address] (which disclaims liability or warranty for this information). If you have questions about a medical condition or this instruction, always ask your healthcare professional. [place] [address] any warranty or liability for your use of this information. Learning About How to Have a Healthy Back  What causes back pain? Back pain is often caused by overuse, strain, or injury. For example, people often hurt their backs playing sports or working in the yard, being jolted in a car accident, or lifting something too heavy. Aging plays a part too. Your bones and muscles tend to lose strength as you age, which makes injury more likely. The spongy discs between the bones of the spine (vertebrae) may suffer from wear and tear and no longer provide enough cushion between the bones. A disc that bulges or breaks open (herniated disc) can press on nerves, causing back pain. In some people, back pain is the result of arthritis, broken vertebrae caused by bone loss (osteoporosis), illness, or a spine problem. Although most people have back pain at one time or another, there are steps you can take to make it less likely. How can you have a healthy back?   Reduce stress on your back through good posture  Slumping or slouching alone may not cause low back pain. But after the back has been strained or injured, bad posture can make pain worse. · Sleep in a position that maintains your back's normal curves and on a mattress that feels comfortable. Sleep on your side with a pillow between your knees, or sleep on your back with a pillow under your knees. These positions can reduce strain on your back. · Stand and sit up straight. \"Good posture\" generally means your ears, shoulders, and hips are in a straight line. · If you must stand for a long time, put one foot on a stool, ledge, or box. Switch feet every now and then. · Sit in a chair that is low enough to let you place both feet flat on the floor with both knees nearly level with your hips. If your chair or desk is too high, use a footrest to raise your knees. Place a small pillow, a rolled-up towel, or a lumbar roll in the curve of your back if you need extra support. · Try a kneeling chair, which helps tilt your hips forward. This takes pressure off your lower back. · Try sitting on an exercise ball. It can rock from side to side, which helps keep your back loose. · When driving, keep your knees nearly level with your hips. Sit straight, and drive with both hands on the steering wheel. Your arms should be in a slightly bent position. Reduce stress on your back through careful lifting  · Squat down, bending at the hips and knees only. If you need to, put one knee to the floor and extend your other knee in front of you, bent at a right angle (half kneeling). · Press your chest straight forward. This helps keep your upper back straight while keeping a slight arch in your low back. · Hold the load as close to your body as possible, at the level of your belly button (navel). · Use your feet to change direction, taking small steps. · Lead with your hips as you change direction. Keep your shoulders in line with your hips as you move.   · Set down your load carefully, squatting with your knees and hips only. Exercise and stretch your back  · Do some exercise on most days of the week, if your doctor says it is okay. You can walk, run, swim, or cycle. · Stretch your back muscles. Here are a few exercises to try:  Ana Marcano on your back, and gently pull one bent knee to your chest. Put that foot back on the floor, and then pull the other knee to your chest.  ¨ Do pelvic tilts. Lie on your back with your knees bent. Tighten your stomach muscles. Pull your belly button (navel) in and up toward your ribs. You should feel like your back is pressing to the floor and your hips and pelvis are slightly lifting off the floor. Hold for 6 seconds while breathing smoothly. ¨ Sit with your back flat against a wall. · Keep your core muscles strong. The muscles of your back, belly (abdomen), and buttocks support your spine. ¨ Pull in your belly and imagine pulling your navel toward your spine. Hold this for 6 seconds, then relax. Remember to keep breathing normally as you tense your muscles. ¨ Do curl-ups. Always do them with your knees bent. Keep your low back on the floor, and curl your shoulders toward your knees using a smooth, slow motion. Keep your arms folded across your chest. If this bothers your neck, try putting your hands behind your neck (not your head), with your elbows spread apart. ¨ Lie on your back with your knees bent and your feet flat on the floor. Tighten your belly muscles, and then push with your feet and raise your buttocks up a few inches. Hold this position 6 seconds as you continue to breathe normally, then lower yourself slowly to the floor. Repeat 8 to 12 times. ¨ If you like group exercise, try Pilates or yoga. These classes have poses that strengthen the core muscles. Lead a healthy lifestyle  · Stay at a healthy weight to avoid strain on your back. · Do not smoke. Smoking increases the risk of osteoporosis, which weakens the spine.  If you need help quitting, talk to your doctor about stop-smoking programs and medicines. These can increase your chances of quitting for good. Where can you learn more? Go to http://sterling-ishaan.info/. Enter L315 in the search box to learn more about \"Learning About How to Have a Healthy Back. \"  Current as of: March 21, 2017  Content Version: 11.4  © 2880-7211 [employer], [employer]. Care instructions adapted under license by [employer] (which disclaims liability or warranty for this information). If you have questions about a medical condition or this instruction, always ask your healthcare professional. [place] [address] any warranty or liability for your use of this information.

## 2018-05-21 NOTE — ED PROVIDER NOTES
HPI Comments: 56 y.o. female with past medical history significant for diabetes, schizoaffective disorder, and anxiety who presents from home via EMS with chief complaint of back pain. Pt is a poor historian, arrives with multiple complaints. Pt reports lower back pain for an unknown amount of time, pt notes she was told it is caused by \"a kidney infection or a UTI because I take long showers\". Pt also reports central chest tightness for a long time, pt states \"I been having that\", denies any cardiac history. Pt reports R shoulder pain and swelling over the last day, states she received haldol shots once a month. Pt denies any recent falls or injuries. Pt denies any dysuria, diarrhea, constipation, or fever. There are no other acute medical concerns at this time. Full history, physical exam, and ROS unable to be obtained due to:  Psychiatric history. Social hx: Nonsmoker; No EtOH use  PCP: Manuel Monahan MD    Note written by Mercy Valnetino, as dictated by Waleska Joaquin MD 2:16 PM    The history is provided by the patient. No  was used. Past Medical History:   Diagnosis Date    Diabetes ([address]     High cholesterol     Other ill-defined conditions(799.89)     hypercholesteremia    Psychiatric disorder     SCHIZOAFFECTIVE DISORDER, anxiety, bipolar    Schizoaffective disorder ([facility] [address]        Past Surgical History:   Procedure Laterality Date    HX CHOLECYSTECTOMY  03/03/2017    beto colmenaresyjqcv-[facility]    HX TUBAL LIGATION  1991         No family history on file. Social History     Social History    Marital status:      Spouse name: N/A    Number of children: N/A    Years of education: N/A     Occupational History    Not on file.      Social History Main Topics    Smoking status: Never Smoker    Smokeless tobacco: Never Used    Alcohol use No    Drug use: No    Sexual activity: Not on file     Other Topics Concern    Not on file     Social History Narrative         ALLERGIES: Penicillins    Review of Systems   Unable to perform ROS: Psychiatric disorder       Vitals:    05/21/18 1403   BP: 133/72   Resp: 18            Physical Exam   Constitutional: She is oriented to person, place, and time. She appears well-developed. No distress. Obese. HENT:   Head: Normocephalic and atraumatic. Eyes: Conjunctivae and EOM are normal. Pupils are equal, round, and reactive to light. Neck: Normal range of motion. Neck supple. Cardiovascular: Normal rate, regular rhythm and normal heart sounds. No murmur heard. Pulmonary/Chest: Effort normal and breath sounds normal. No respiratory distress. Abdominal: Soft. Bowel sounds are normal. She exhibits no distension. There is no tenderness. There is no rebound. Musculoskeletal: Normal range of motion. She exhibits no edema. Cervical back: She exhibits no bony tenderness. Thoracic back: She exhibits no bony tenderness. Lumbar back: She exhibits no bony tenderness. No bony tenderness over spine. Neurological: She is alert and oriented to person, place, and time. No cranial nerve deficit. She exhibits normal muscle tone. Coordination normal.   Skin: Skin is warm and dry. No rash noted. No erythema. 2 cm x 3 cm area over R deltoid that is firm and tender to palpation, no surrounding erythema. Psychiatric: She has a normal mood and affect. Her behavior is normal.   Nursing note and vitals reviewed.      Note written by Mercy Hwang, as dictated by Tessie Singh MD 2:16 PM    [facility]      ED Course       I&D Abcess Simple  Date/Time: 5/21/2018 3:07 PM  Performed by: January Ledbetter  Authorized by: aJnuary Ledbetter     Consent:     Consent obtained:  Verbal    Consent given by:  Patient  Location:     Type:  Abscess    Location:  Upper extremity    Upper extremity location:  Shoulder    Shoulder location:  R shoulder  Pre-procedure details:     Skin preparation: Chloraprep  Anesthesia (see MAR for exact dosages): Anesthesia method:  Local infiltration    Local anesthetic:  Lidocaine 1% WITH epi  Procedure details:     Incision types:  Stab incision    Incision and drainage depth: 1 cm incision. Scalpel blade:  11    Wound management:  Probed and deloculated    Drainage:  Purulent and bloody (Minimal purulent drainage, mostly bloody)    Wound treatment:  Wound left open    Packing materials:  None  Post-procedure details:     Patient tolerance of procedure: Tolerated well, no immediate complications    Note written by Mercy Haley, as dictated by Rangel Lopez MD 2:16 PM    ED EKG interpretation: 14:29  Rhythm: normal sinus rhythm; and regular . Rate (approx.): 68 bpm; Axis: normal; Normal intervals; no STEMI; Diffuse T wave inversions in precordial leads. When compared to 10/25/17 study, T wave inversions are present, but less pronounced than today.      Note written by Mercy Haley, as dictated by Rangel Lopez MD 2:31 PM

## 2018-05-21 NOTE — ED TRIAGE NOTES
Patient reports back pain for 2 months ago. She is rambling in triage says she has a \"Kidney infection\" or a \"yeast infection\". Patient says \"I is running to the bathroom\". Denies vaginal discharge and itching. Right arm and shoulder pain for 2 weeks since her haldol shot.

## 2018-05-22 LAB
BACTERIA SPEC CULT: NORMAL
CC UR VC: NORMAL
SERVICE CMNT-IMP: NORMAL

## 2018-05-25 ENCOUNTER — HOSPITAL ENCOUNTER (EMERGENCY)
Age: 58
Discharge: HOME | End: 2018-05-25
Attending: EMERGENCY MEDICINE
Payer: MEDICARE

## 2018-05-25 VITALS
DIASTOLIC BLOOD PRESSURE: 86 MMHG | HEIGHT: 64 IN | SYSTOLIC BLOOD PRESSURE: 144 MMHG | OXYGEN SATURATION: 98 % | HEART RATE: 98 BPM | RESPIRATION RATE: 16 BRPM | TEMPERATURE: 98.5 F

## 2018-05-25 DIAGNOSIS — F41.1 ANXIETY STATE: Primary | ICD-10-CM

## 2018-05-25 DIAGNOSIS — F45.8 DELUSION OF PREGNANCY: ICD-10-CM

## 2018-05-25 LAB
ALBUMIN SERPL-MCNC: 4 G/DL (ref 3.5–5)
ALBUMIN/GLOB SERPL: 1 {RATIO} (ref 1.1–2.2)
ALP SERPL-CCNC: 139 U/L (ref 45–117)
ALT SERPL-CCNC: 46 U/L (ref 12–78)
AMPHET UR QL SCN: NEGATIVE
ANION GAP SERPL CALC-SCNC: 10 MMOL/L (ref 5–15)
APAP SERPL-MCNC: <2 UG/ML (ref 10–30)
APPEARANCE UR: ABNORMAL
AST SERPL-CCNC: 38 U/L (ref 15–37)
BACTERIA URNS QL MICRO: ABNORMAL /HPF
BARBITURATES UR QL SCN: NEGATIVE
BASOPHILS # BLD: 0 K/UL (ref 0–0.1)
BASOPHILS NFR BLD: 1 % (ref 0–1)
BENZODIAZ UR QL: NEGATIVE
BILIRUB SERPL-MCNC: 0.4 MG/DL (ref 0.2–1)
BILIRUB UR QL: NEGATIVE
BUN SERPL-MCNC: 15 MG/DL (ref 6–20)
BUN/CREAT SERPL: 16 (ref 12–20)
CALCIUM SERPL-MCNC: 8.9 MG/DL (ref 8.5–10.1)
CANNABINOIDS UR QL SCN: NEGATIVE
CHLORIDE SERPL-SCNC: 104 MMOL/L (ref 97–108)
CO2 SERPL-SCNC: 24 MMOL/L (ref 21–32)
COCAINE UR QL SCN: NEGATIVE
COLOR UR: ABNORMAL
CREAT SERPL-MCNC: 0.94 MG/DL (ref 0.55–1.02)
DIFFERENTIAL METHOD BLD: ABNORMAL
DRUG SCRN COMMENT,DRGCM: NORMAL
EOSINOPHIL # BLD: 0.1 K/UL (ref 0–0.4)
EOSINOPHIL NFR BLD: 1 % (ref 0–7)
EPITH CASTS URNS QL MICRO: ABNORMAL /LPF
ERYTHROCYTE [DISTWIDTH] IN BLOOD BY AUTOMATED COUNT: 13.4 % (ref 11.5–14.5)
ETHANOL SERPL-MCNC: <10 MG/DL
GLOBULIN SER CALC-MCNC: 3.9 G/DL (ref 2–4)
GLUCOSE SERPL-MCNC: 99 MG/DL (ref 65–100)
GLUCOSE UR STRIP.AUTO-MCNC: NEGATIVE MG/DL
HCG UR QL: NEGATIVE
HCT VFR BLD AUTO: 36.6 % (ref 35–47)
HGB BLD-MCNC: 12.2 G/DL (ref 11.5–16)
HGB UR QL STRIP: NEGATIVE
IMM GRANULOCYTES # BLD: 0 K/UL (ref 0–0.04)
IMM GRANULOCYTES NFR BLD AUTO: 0 % (ref 0–0.5)
KETONES UR QL STRIP.AUTO: 15 MG/DL
LEUKOCYTE ESTERASE UR QL STRIP.AUTO: ABNORMAL
LYMPHOCYTES # BLD: 3 K/UL (ref 0.8–3.5)
LYMPHOCYTES NFR BLD: 37 % (ref 12–49)
MCH RBC QN AUTO: 32.2 PG (ref 26–34)
MCHC RBC AUTO-ENTMCNC: 33.3 G/DL (ref 30–36.5)
MCV RBC AUTO: 96.6 FL (ref 80–99)
METHADONE UR QL: NEGATIVE
MONOCYTES # BLD: 0.7 K/UL (ref 0–1)
MONOCYTES NFR BLD: 8 % (ref 5–13)
NEUTS SEG # BLD: 4.3 K/UL (ref 1.8–8)
NEUTS SEG NFR BLD: 53 % (ref 32–75)
NITRITE UR QL STRIP.AUTO: NEGATIVE
NRBC # BLD: 0 K/UL (ref 0–0.01)
NRBC BLD-RTO: 0 PER 100 WBC
OPIATES UR QL: NEGATIVE
PCP UR QL: NEGATIVE
PH UR STRIP: 6 [PH] (ref 5–8)
PLATELET # BLD AUTO: 207 K/UL (ref 150–400)
PMV BLD AUTO: 9.6 FL (ref 8.9–12.9)
POTASSIUM SERPL-SCNC: 3.7 MMOL/L (ref 3.5–5.1)
PROT SERPL-MCNC: 7.9 G/DL (ref 6.4–8.2)
PROT UR STRIP-MCNC: ABNORMAL MG/DL
RBC # BLD AUTO: 3.79 M/UL (ref 3.8–5.2)
RBC #/AREA URNS HPF: ABNORMAL /HPF (ref 0–5)
SALICYLATES SERPL-MCNC: <1.7 MG/DL (ref 2.8–20)
SODIUM SERPL-SCNC: 138 MMOL/L (ref 136–145)
SP GR UR REFRACTOMETRY: 1.02 (ref 1–1.03)
UR CULT HOLD, URHOLD: NORMAL
URATE CRY URNS QL MICRO: ABNORMAL
UROBILINOGEN UR QL STRIP.AUTO: 1 EU/DL (ref 0.2–1)
WBC # BLD AUTO: 8.1 K/UL (ref 3.6–11)
WBC URNS QL MICRO: >100 /HPF (ref 0–4)

## 2018-05-25 PROCEDURE — 85025 COMPLETE CBC W/AUTO DIFF WBC: CPT | Performed by: EMERGENCY MEDICINE

## 2018-05-25 PROCEDURE — 36415 COLL VENOUS BLD VENIPUNCTURE: CPT | Performed by: EMERGENCY MEDICINE

## 2018-05-25 PROCEDURE — 80307 DRUG TEST PRSMV CHEM ANLYZR: CPT | Performed by: EMERGENCY MEDICINE

## 2018-05-25 PROCEDURE — 81025 URINE PREGNANCY TEST: CPT

## 2018-05-25 PROCEDURE — 99284 EMERGENCY DEPT VISIT MOD MDM: CPT

## 2018-05-25 PROCEDURE — 80053 COMPREHEN METABOLIC PANEL: CPT | Performed by: EMERGENCY MEDICINE

## 2018-05-25 PROCEDURE — 81001 URINALYSIS AUTO W/SCOPE: CPT | Performed by: EMERGENCY MEDICINE

## 2018-05-25 NOTE — ED TRIAGE NOTES
Pt arrives from [facility] via EMS reporting LLQ abdominal pain and bilateral lower back pain since Monday. Pt reports she was diagnosed with recent UTI and taking Bactrim. Pt denies current urinary frequency, discharge, or hematuria. Pt states, \"I'm 8 months pregnant with no menstrual cycle and I had a sterilization procedure like 34 years ago. \" \"I can't do nothing wrong, my  going to call me a murderer if anything happens to this baby. I don't know where he is\". Pt has a history of anxiety and bipolar.

## 2018-05-25 NOTE — ED PROVIDER NOTES
HPI Comments: 56 y.o. female with past medical history significant for diabetes, hypercholesterolemia, anxiety, and bipolar disorder who presents via EMS with chief complaint of mental health problem. Patient notes that she is \"8 months pregnant\" and that her  \"will call her a murderer if anything happens to this baby. \" Patient notes that she lives alone in an independent living facility and does not know where her  lives. She notes that she does her housework by herself and gets grocery money from food stamps. Patient reports that she had menopause for 1-2 years and denies current menstrual cycles. Of note, patient reports that she takes anxiety and bipolar disorder medications. Patient also notes appetite loss. Pertinent negatives include SI, HI, and hallucinations. There are no other acute medical concerns at this time. Social hx: Denies tobacco use; denies alcohol use; denies illicit drug use  PCP: Shaq Wolfe MD    Note written by Mercy Balbuena, as dictated by Stephen Pimentel MD 5:36 PM      The history is provided by the patient. No  was used. Past Medical History:   Diagnosis Date    Diabetes ([address]     High cholesterol     Other ill-defined conditions(799.89)     hypercholesteremia    Psychiatric disorder     SCHIZOAFFECTIVE DISORDER, anxiety, bipolar    Schizoaffective disorder ([address]        Past Surgical History:   Procedure Laterality Date    HX CHOLECYSTECTOMY  03/03/2017    [facility]    HX TUBAL LIGATION  1991         History reviewed. No pertinent family history. Social History     Social History    Marital status:      Spouse name: N/A    Number of children: N/A    Years of education: N/A     Occupational History    Not on file.      Social History Main Topics    Smoking status: Never Smoker    Smokeless tobacco: Never Used    Alcohol use No    Drug use: No    Sexual activity: Not on file Other Topics Concern    Not on file     Social History Narrative         ALLERGIES: Penicillins    Review of Systems   Constitutional: Positive for appetite change (decreased). Negative for chills and fever. HENT: Negative for rhinorrhea, sore throat and trouble swallowing. Eyes: Negative for photophobia. Respiratory: Negative for cough and shortness of breath. Cardiovascular: Negative for chest pain and palpitations. Gastrointestinal: Negative for abdominal pain, nausea and vomiting. Genitourinary: Negative for dysuria, frequency and hematuria. Musculoskeletal: Negative for arthralgias. Neurological: Negative for dizziness, syncope and weakness. Psychiatric/Behavioral: Negative for behavioral problems, hallucinations and suicidal ideas. Vitals:    05/25/18 1724 05/25/18 1734   BP: 131/75    Pulse: 82    Resp: 14    Temp: 98.5 °F (36.9 °C)    SpO2: 96% 96%   Height: 5' 4\" (1.626 m)             Physical Exam   Constitutional: She appears well-developed and well-nourished. Obese with muscle deconditioning   HENT:   Head: Normocephalic and atraumatic. Mouth/Throat: Oropharynx is clear and moist.   Eyes: EOM are normal. Pupils are equal, round, and reactive to light. Neck: Normal range of motion. Neck supple. Cardiovascular: Normal rate, regular rhythm, normal heart sounds and intact distal pulses. Exam reveals no gallop and no friction rub. No murmur heard. Pulmonary/Chest: Effort normal. No respiratory distress. She has no wheezes. She has no rales. Abdominal: Soft. There is no tenderness. There is no rebound. Musculoskeletal: Normal range of motion. She exhibits no tenderness. Neurological: She is alert. No cranial nerve deficit. Motor; symmetric   Skin: No erythema. Psychiatric: Her behavior is normal. Her mood appears anxious (mild). Behavior: Cooperative  Cognition: Normal except for her delusion of being pregnant   Nursing note and vitals reviewed.    Note written by Mercy Stevens, as dictated by Mayank Baptiste MD 5:36 PM      [facility]      ED Course       Procedures

## 2018-05-25 NOTE — ED NOTES
Transport arranged for patient, patient ambulatory to waiting room to await . A&O x4, RA, pain controlled.

## 2018-05-25 NOTE — ED NOTES
Patient given discharge instructions. No questions or concerns at this time. Patient's VSS and in no acute distress. Patient ambulatory out of unit at discharge to waiting room to await cab.

## 2018-05-25 NOTE — BSMART NOTE
Comprehensive Assessment Form Part 1      Section I - Disposition    Axis I - Schziophernia   Axis II - Deferred  Axis III -  Past Medical History Date Comments      Diabetes ([facility] [E11.9]       Other ill-defined conditions(799.89) [R69]  hypercholesteremia     Schizoaffective disorder ([facility] [F25.9]       High cholesterol [E78.00]       Psychiatric disorder [F99]          Axis IV - Lacks support system  [place] V -       The Medical Doctor to Psychiatrist conference was not completed. The Medical Doctor is in agreement with Psychiatrist disposition because of (reason) patient does not require psychiatric hospitalization. The plan is discharge. [facility]-PACT team Huseyin Branham reported that she will see patient Saturday. Patient was seen earlier today and sees them weekly. The on-call Psychiatrist consulted was Dr. Lena Sanchez. The admitting Psychiatrist will be Dr. Scott Pierce. The admitting Diagnosis is none. The Payor source is [address] [place] [address]. The name of the representative was . This was approved for  days. The authorization number is . Section II - Integrated Summary  Summary:  Patient is 56year old AA female reporting to ED; Pt arrives from [facility] via EMS reporting LLQ abdominal pain and bilateral lower back pain since Monday. Pt reports she was diagnosed with recent UTI and taking Bactrim. Pt denies current urinary frequency, discharge, or hematuria. Pt states, \"I'm 8 months pregnant with no menstrual cycle and I had a sterilization procedure like 34 years ago. \" \"I can't do nothing wrong, my  going to call me a murderer if anything happens to this baby. I don't know where he is\". Pt has a history of anxiety and bipolar. At bedside, patient denied suicidal, homicidal and hallucinations. Patient reported she was coming because she was having labor pains associated with her being 8 months pregnant. Patient reported she was cleaning and her back started hurting. Patient reported living independently and stated she has been doing well. Patient initially stated she did not want identify who her psychiatrist is with as reported she does not want to take baby and think that she is not able to take care of baby. Patient then reported having services with [facility] and PACT team. Patient started laughing when asked where her  was/ father of baby stated \"I couldn't tell you\". Patient reported feeling she was pregnant because her back was hurting and stated look at my stomach, stating it looks pregnant. Patient saw pregnancy test stating being negative and said what does that mean, the lines were explained and she continued to question why she felt how she did. The client was seen earlier this week for a UTI and she continues to take medications for it. Patient does not use substances. Patient was calm and cooperative. Patient reported previously she was feelings depressed because she has two adult children that she hasnot spoken to in 3 months, as reported they called her three days ago and she love it; as reported felt better. Patient reported they said they have been working a lot.  spoke with Akhil Roberts from [facility] PACT team and she reported seeing patient today and will see patient on Saturday. The patienthas demonstrated mental capacity to provide informed consent. The information is given by the patient. The Chief Complaint is pregnant/ delusional .  The Precipitant Factors are stress/ birthday. Previous Hospitalizations: yes  The patient has not previously been in restraints. Current Psychiatrist and/or  is [facility]/ PACT team.    Lethality Assessment:    The potential for suicide noted by the following: not noted . The potential for homicide is not noted. The patient has not been a perpetrator of sexual or physical abuse. There are not pending charges. The patient is not felt to be at risk for self harm or harm to others.   The attending nurse was advised not noted. Section III - Psychosocial  The patient's overall mood and attitude is calm and cooperative. Feelings of helplessness and hopelessness are not observed. Generalized anxiety is not observed. Panic is not observed. Phobias are not observed. Obsessive compulsive tendencies are not observed. Section IV - Mental Status Exam  The patient's appearance shows no evidence of impairment. The patient's behavior shows no evidence of impairment. The patient is oriented to time, place, person and situation. The patient's speech shows no evidence of impairment. The patient's mood is euthymic. The range of affect shows no evidence of impairment. The patient's thought content demonstrates no evidence of impairment. The thought process shows no evidence of impairment. The patient's perception shows no evidence of impairment. The patient's memory shows no evidence of impairment. The patient's appetite shows no evidence of impairment. The patient's sleep shows no evidence of impairment. The patient's insight shows no evidence of impairment. The patient's judgement shows no evidence of impairment. Section V - Substance Abuse  The patient is not using substances. The patient is using not noted. The patient has experienced the following withdrawal symptoms: N/A. Section VI - Living Arrangements  The patient is single. The patient lives alone. The patient has 2 children ages adults. The patient does plan to return home upon discharge. The patient does not have legal issues pending. The patient's source of income comes from disability. Adventist and cultural practices have not been voiced at this time. The patient's greatest support comes from community supports [facility] and this person will be involved with the treatment.     The patient has not been in an event described as horrible or outside the realm of ordinary life experience either currently or in the past.  The patient has not been a victim of sexual/physical abuse. Section VII - Other Areas of Clinical Concern  The highest grade achieved is 12th with the overall quality of school experience being described as unknown. The patient is currently unemployed and speaks [place] as a primary language. The patient has no communication impairments affecting communication. The patient's preference for learning can be described as: can read and write adequately.   The patient's hearing is normal.  The patient's vision is normal.      Lupis Wright

## 2018-05-31 ENCOUNTER — APPOINTMENT (OUTPATIENT)
Dept: GENERAL RADIOLOGY | Age: 58
End: 2018-05-31
Attending: PHYSICIAN ASSISTANT
Payer: MEDICARE

## 2018-05-31 ENCOUNTER — HOSPITAL ENCOUNTER (EMERGENCY)
Age: 58
Discharge: HOME | End: 2018-05-31
Attending: EMERGENCY MEDICINE
Payer: MEDICARE

## 2018-05-31 VITALS
OXYGEN SATURATION: 96 % | HEIGHT: 64 IN | TEMPERATURE: 98.2 F | WEIGHT: 232 LBS | RESPIRATION RATE: 18 BRPM | DIASTOLIC BLOOD PRESSURE: 86 MMHG | BODY MASS INDEX: 39.61 KG/M2 | HEART RATE: 72 BPM | SYSTOLIC BLOOD PRESSURE: 157 MMHG

## 2018-05-31 DIAGNOSIS — E66.01 SEVERE OBESITY (BMI 35.0-39.9): ICD-10-CM

## 2018-05-31 DIAGNOSIS — F25.9 SCHIZOAFFECTIVE DISORDER, UNSPECIFIED TYPE (HCC): Primary | ICD-10-CM

## 2018-05-31 DIAGNOSIS — F45.8 DELUSION OF PREGNANCY: ICD-10-CM

## 2018-05-31 LAB
AMPHET UR QL SCN: NEGATIVE
ANION GAP SERPL CALC-SCNC: 9 MMOL/L (ref 5–15)
APPEARANCE UR: ABNORMAL
ATRIAL RATE: 70 BPM
BACTERIA URNS QL MICRO: NEGATIVE /HPF
BARBITURATES UR QL SCN: NEGATIVE
BASOPHILS # BLD: 0 K/UL (ref 0–0.1)
BASOPHILS NFR BLD: 0 % (ref 0–1)
BENZODIAZ UR QL: NEGATIVE
BILIRUB UR QL: NEGATIVE
BUN SERPL-MCNC: 19 MG/DL (ref 6–20)
BUN/CREAT SERPL: 24 (ref 12–20)
CALCIUM SERPL-MCNC: 8.8 MG/DL (ref 8.5–10.1)
CALCULATED P AXIS, ECG09: 52 DEGREES
CALCULATED R AXIS, ECG10: -27 DEGREES
CANNABINOIDS UR QL SCN: NEGATIVE
CAOX CRY URNS QL MICRO: ABNORMAL
CHLORIDE SERPL-SCNC: 105 MMOL/L (ref 97–108)
CO2 SERPL-SCNC: 28 MMOL/L (ref 21–32)
COCAINE UR QL SCN: NEGATIVE
COLOR UR: ABNORMAL
CREAT SERPL-MCNC: 0.79 MG/DL (ref 0.55–1.02)
DIAGNOSIS, 93000: NORMAL
DIFFERENTIAL METHOD BLD: ABNORMAL
DRUG SCRN COMMENT,DRGCM: NORMAL
EOSINOPHIL # BLD: 0.1 K/UL (ref 0–0.4)
EOSINOPHIL NFR BLD: 1 % (ref 0–7)
EPITH CASTS URNS QL MICRO: ABNORMAL /LPF
ERYTHROCYTE [DISTWIDTH] IN BLOOD BY AUTOMATED COUNT: 13 % (ref 11.5–14.5)
ETHANOL SERPL-MCNC: <10 MG/DL
GLUCOSE SERPL-MCNC: 111 MG/DL (ref 65–100)
GLUCOSE UR STRIP.AUTO-MCNC: NEGATIVE MG/DL
HCT VFR BLD AUTO: 34.3 % (ref 35–47)
HGB BLD-MCNC: 11.5 G/DL (ref 11.5–16)
HGB UR QL STRIP: ABNORMAL
IMM GRANULOCYTES # BLD: 0 K/UL (ref 0–0.04)
IMM GRANULOCYTES NFR BLD AUTO: 0 % (ref 0–0.5)
KETONES UR QL STRIP.AUTO: 15 MG/DL
LEUKOCYTE ESTERASE UR QL STRIP.AUTO: ABNORMAL
LYMPHOCYTES # BLD: 2.7 K/UL (ref 0.8–3.5)
LYMPHOCYTES NFR BLD: 36 % (ref 12–49)
MCH RBC QN AUTO: 32 PG (ref 26–34)
MCHC RBC AUTO-ENTMCNC: 33.5 G/DL (ref 30–36.5)
MCV RBC AUTO: 95.5 FL (ref 80–99)
METHADONE UR QL: NEGATIVE
MONOCYTES # BLD: 0.6 K/UL (ref 0–1)
MONOCYTES NFR BLD: 9 % (ref 5–13)
NEUTS SEG # BLD: 4 K/UL (ref 1.8–8)
NEUTS SEG NFR BLD: 54 % (ref 32–75)
NITRITE UR QL STRIP.AUTO: NEGATIVE
NRBC # BLD: 0 K/UL (ref 0–0.01)
NRBC BLD-RTO: 0 PER 100 WBC
OPIATES UR QL: NEGATIVE
P-R INTERVAL, ECG05: 140 MS
PCP UR QL: NEGATIVE
PH UR STRIP: 6 [PH] (ref 5–8)
PLATELET # BLD AUTO: 220 K/UL (ref 150–400)
PMV BLD AUTO: 9.8 FL (ref 8.9–12.9)
POTASSIUM SERPL-SCNC: 3.6 MMOL/L (ref 3.5–5.1)
PROT UR STRIP-MCNC: NEGATIVE MG/DL
Q-T INTERVAL, ECG07: 422 MS
QRS DURATION, ECG06: 90 MS
QTC CALCULATION (BEZET), ECG08: 455 MS
RBC # BLD AUTO: 3.59 M/UL (ref 3.8–5.2)
RBC #/AREA URNS HPF: ABNORMAL /HPF (ref 0–5)
SODIUM SERPL-SCNC: 142 MMOL/L (ref 136–145)
SP GR UR REFRACTOMETRY: 1.03 (ref 1–1.03)
TROPONIN I BLD-MCNC: <0.04 NG/ML (ref 0–0.08)
UA: UC IF INDICATED,UAUC: ABNORMAL
UROBILINOGEN UR QL STRIP.AUTO: 1 EU/DL (ref 0.2–1)
VALPROATE SERPL-MCNC: 32 UG/ML (ref 50–100)
VENTRICULAR RATE, ECG03: 70 BPM
WBC # BLD AUTO: 7.4 K/UL (ref 3.6–11)
WBC URNS QL MICRO: ABNORMAL /HPF (ref 0–4)

## 2018-05-31 PROCEDURE — 36415 COLL VENOUS BLD VENIPUNCTURE: CPT | Performed by: PHYSICIAN ASSISTANT

## 2018-05-31 PROCEDURE — 80307 DRUG TEST PRSMV CHEM ANLYZR: CPT | Performed by: PHYSICIAN ASSISTANT

## 2018-05-31 PROCEDURE — 80164 ASSAY DIPROPYLACETIC ACD TOT: CPT | Performed by: PHYSICIAN ASSISTANT

## 2018-05-31 PROCEDURE — 93005 ELECTROCARDIOGRAM TRACING: CPT

## 2018-05-31 PROCEDURE — 99284 EMERGENCY DEPT VISIT MOD MDM: CPT

## 2018-05-31 PROCEDURE — 80048 BASIC METABOLIC PNL TOTAL CA: CPT | Performed by: PHYSICIAN ASSISTANT

## 2018-05-31 PROCEDURE — 74018 RADEX ABDOMEN 1 VIEW: CPT

## 2018-05-31 PROCEDURE — 81001 URINALYSIS AUTO W/SCOPE: CPT | Performed by: PHYSICIAN ASSISTANT

## 2018-05-31 PROCEDURE — 84484 ASSAY OF TROPONIN QUANT: CPT

## 2018-05-31 PROCEDURE — 85025 COMPLETE CBC W/AUTO DIFF WBC: CPT | Performed by: PHYSICIAN ASSISTANT

## 2018-05-31 NOTE — ED NOTES
Patient states she went to see PCP today for check up. States she is having abd pain due to being in labor, reports to be 8 months pregnant. \"Dr. Woody Chandler sent me here for labor pains\".

## 2018-05-31 NOTE — ED NOTES
Patient discharged to home at this time with self. Patient provided with written instructions and 0 prescriptions. All questions answered. Pt provided ride home with courtesy Tamara Lobo.

## 2018-05-31 NOTE — BSMART NOTE
BSMART Note    Patient is a 66year old female. She was sent to the ER via EMS from her PCP Dr. Kayla Rios' office at [facility]. She told Dr. Kayla Rios she was having abdominal pain due to being in labor. She reported she is 8 months pregnant. She denied suicidal and homicidal ideation. She was seen at [facility] ER on 5/25/18 for the same complaint and was discharged. Patient is a client of the [facility] PACT team.  This clinician called and spoke to Babita Gilman from the PACT team.  He reported that patient has some delusions at baseline. She is medication compliant and is seen 3 times a week for medication delivery/observation. Patient has presented at her baseline recently. She is due to be seen by the team tomorrow. Given this historical information the ER attending did not want patient to be seen face to face by [facility]. Once she is medically cleared she will be discharged home and will see the [facility] PACT team tomorrow.     Douglas Santillan

## 2018-05-31 NOTE — ED TRIAGE NOTES
Patient arrives by EMS from [facility] where the patient was seeing Dr. Kelly Walsh. Pt's doctor wanted her seen for abdominal pain and mental health concerns. Patient thinks she is pregnant. Patient denies SI or HI.

## 2018-05-31 NOTE — DISCHARGE INSTRUCTIONS
Schizophrenia: Care Instructions  Your Care Instructions    Schizophrenia is a disease that makes it hard to think clearly, manage emotions, and interact with other people. It can cause:  · Delusions. These are beliefs that are not real.  · Hallucinations. These are things that you may see or hear that are not really there. · Paranoia. This is the belief that others are lying, cheating, using you, or trying to harm you. The disease may change your ability to enjoy life, express emotions, or function. At times, you may hear voices, behave strangely, have trouble speaking or understanding speech, or keep to yourself. The goal of treatment is to lower your stress and help your brain function normally. You may need lifelong treatment with medicines and counseling to keep your schizophrenia under control. When schizophrenia is not treated, the risks are higher for suicide, a hospital stay, and other problems. Early treatment called coordinated specialty care [facility]) may help a person who is having his or her first episode of psychotic thoughts. Ask your doctor about [address]. Follow-up care is a key part of your treatment and safety. Be sure to make and go to all appointments, and call your doctor if you are having problems. It's also a good idea to know your test results and keep a list of the medicines you take. How can you care for yourself at home? · Be safe with medicines. Take your medicines exactly as prescribed. Call your doctor if you think you are having a problem with your medicine. When you feel good, you may think that you do not need your medicines. But it is important to keep taking them as scheduled. · Go to your counseling sessions. Call and talk with your counselor if you can't attend or if you don't think the sessions are helping. Do not just stop going. · Eat a healthy diet. Talk with a dietitian about what type of diet may be best for you. · Do not use alcohol or illegal drugs.   · Keep the numbers for these national suicide hotlines: 9-172-771-TALK (1-483.317.8509) and 2-784-WANNXSA (7-284.779.2752). If you or someone you know talks about suicide or feeling hopeless, get help right away. What should you do if someone in your family has schizophrenia? · Learn about the disease and how it may get worse over time. · Remind your family member that you love him or her. · Make a plan with all family members about how to take care of your loved one when his or her symptoms are bad. · Talk about your fears and concerns and those of other family members. Seek counseling if needed. · Do not focus attention only on the person who is in treatment. · Remind yourself that it will take time for changes to occur. · Do not blame yourself for the disease. · Know your legal rights and the legal rights of your family member. · Take care of yourself. Stay involved with your own interests, such as your career, hobbies, and friends. Use exercise, positive self-talk, relaxation, and deep breathing to help lower your stress. · Give yourself time to grieve. You may need to deal with emotions such as anger, fear, and frustration. After you work through your feelings, you will be better able to care for yourself and your family. · If you are having a hard time with your feelings and your interactions with your family member, talk with a counselor. When should you call for help? Call 911 anytime you think you may need emergency care. For example, call if:  ? · You are thinking about suicide or are threatening suicide. ? · You feel like hurting yourself or someone else. ?Call your doctor now or seek immediate medical care if:  ? · You hear voices. ? · You think someone is trying to harm you. ? · You cannot concentrate or are easily confused. ? Watch closely for changes in your health, and be sure to contact your doctor if:  ? · You are having trouble taking care of yourself.    ? · You cannot attend your counseling sessions. Where can you learn more? Go to http://sterling-ishaan.info/. Enter I522 in the search box to learn more about \"Schizophrenia: Care Instructions. \"  Current as of: May 12, 2017  Content Version: 11.4  © 7095-8168 [employer], [employer]. Care instructions adapted under license by [employer] (which disclaims liability or warranty for this information). If you have questions about a medical condition or this instruction, always ask your healthcare professional. [address] any warranty or liability for your use of this information.

## 2018-05-31 NOTE — ED PROVIDER NOTES
EMERGENCY DEPARTMENT HISTORY AND PHYSICAL EXAM      Date: 5/31/2018  Patient Name: Leah Grimm    History of Presenting Illness     Chief Complaint   Patient presents with    Mental Health Problem       History Provided By: Patient    HPI: Leah Grimm, 66 y.o. female with PMHx significant for DM, hypercholesteremia, schizoaffective ds brought in by EMS to ED. Pt sent from Dr. Jenny Davison office - pt's PCP. Pt was being seen today, and she was discussing her pregnancy with Dr. Halina Telles. He recommended pt be seen in ED. Pt states she is 8 mo pregnant and having contractions. Pt also states she has be through menopause x 2 years. States she is f/u with Dr. Halina Telles for OB. Pt states she has been taking meds as prescribed. States she has been eating and sleeping normally. Denies SI and HI. Reports generalized abd pain Denies N/V, diarrhea, constipation, chest pain, SOB. There are no other complaints, changes, or physical findings at this time. PCP: Elmer Childress MD    Current Outpatient Prescriptions   Medication Sig Dispense Refill    lidocaine (LIDODERM) 5 % Apply patch to the affected area for 12 hours a day and remove for 12 hours a day. 6 Each 0    HYDROcodone-acetaminophen (NORCO) 5-325 mg per tablet Take 1 Tab by mouth every four (4) hours as needed for Pain. Max Daily Amount: 6 Tabs. 10 Tab 0    ibuprofen (MOTRIN) 800 mg tablet Take 800 mg by mouth every eight (8) hours as needed for Pain.  metFORMIN (GLUCOPHAGE) 500 mg tablet Take 500 mg by mouth two (2) times daily (with meals).  lovastatin (MEVACOR) 40 mg tablet Take 40 mg by mouth nightly.  benztropine (COGENTIN) 0.5 mg tablet Take 0.5 mg by mouth nightly.  divalproex ER (DEPAKOTE ER) 500 mg ER tablet Take 500 mg by mouth nightly.  escitalopram oxalate (LEXAPRO) 20 mg tablet Take 20 mg by mouth daily.  haloperidol (HALDOL) 10 mg tablet Take 10 mg by mouth nightly.       POTASSIUM CHLORIDE SR 10 MEQ TAB 10 mEq daily.  ziprasidone (GEODON) 80 mg capsule Take 80 mg by mouth nightly. Past History     Past Medical History:  Past Medical History:   Diagnosis Date    Diabetes ([facility] [address]     High cholesterol     Other ill-defined conditions(799.89)     hypercholesteremia    Psychiatric disorder     SCHIZOAFFECTIVE DISORDER, anxiety, bipolar    Schizoaffective disorder ([facility] [address]        Past Surgical History:  Past Surgical History:   Procedure Laterality Date    [facility]    HX TUBAL LIGATION  1991       Family History:  History reviewed. No pertinent family history. Social History:  Social History   Substance Use Topics    Smoking status: Never Smoker    Smokeless tobacco: Never Used    Alcohol use No       Allergies: Allergies   Allergen Reactions    Penicillins Rash         Review of Systems   Review of Systems   Constitutional: Negative for chills and fever. Respiratory: Negative for shortness of breath. Cardiovascular: Negative for chest pain. Gastrointestinal: Positive for abdominal pain. Negative for nausea and vomiting. Genitourinary: Negative for flank pain. Musculoskeletal: Negative for back pain and myalgias. Skin: Negative for color change, pallor, rash and wound. Neurological: Negative for dizziness, weakness and light-headedness. All other systems reviewed and are negative. Physical Exam   Physical Exam   Constitutional: She is oriented to person, place, and time. She appears well-developed and well-nourished. No distress. HENT:   Head: Normocephalic and atraumatic. Eyes: Conjunctivae are normal.   Cardiovascular: Normal rate, regular rhythm and normal heart sounds. Pulmonary/Chest: Effort normal and breath sounds normal. No respiratory distress. Abdominal: Soft. Bowel sounds are normal. She exhibits no distension. Musculoskeletal: Normal range of motion.    Neurological: She is alert and oriented to person, place, and time. Skin: Skin is warm. No rash noted. Psychiatric: Her speech is normal. She is not agitated and not aggressive. Thought content is delusional. Thought content is not paranoid. She expresses no homicidal and no suicidal ideation. She expresses no suicidal plans and no homicidal plans. Continues to show stomach and breasts - talking about pregnancy  Often states she has enough money to pay - order all of the tests - concerned about money  Thoughts are not linear but redirectable  Not distracted  Not agitated  No insight into illness   Nursing note and vitals reviewed. Diagnostic Study Results     Labs -     Recent Results (from the past 12 hour(s))   CBC WITH AUTOMATED DIFF    Collection Time: 05/31/18 12:01 PM   Result Value Ref Range    WBC 7.4 3.6 - 11.0 K/uL    RBC 3.59 (L) 3.80 - 5.20 M/uL    HGB 11.5 11.5 - 16.0 g/dL    HCT 34.3 (L) 35.0 - 47.0 %    MCV 95.5 80.0 - 99.0 FL    MCH 32.0 26.0 - 34.0 PG    MCHC 33.5 30.0 - 36.5 g/dL    RDW 13.0 11.5 - 14.5 %    PLATELET 228 712 - 916 K/uL    MPV 9.8 8.9 - 12.9 FL    NRBC 0.0 0  WBC    ABSOLUTE NRBC 0.00 0.00 - 0.01 K/uL    NEUTROPHILS 54 32 - 75 %    LYMPHOCYTES 36 12 - 49 %    MONOCYTES 9 5 - 13 %    EOSINOPHILS 1 0 - 7 %    BASOPHILS 0 0 - 1 %    IMMATURE GRANULOCYTES 0 0.0 - 0.5 %    ABS. NEUTROPHILS 4.0 1.8 - 8.0 K/UL    ABS. LYMPHOCYTES 2.7 0.8 - 3.5 K/UL    ABS. MONOCYTES 0.6 0.0 - 1.0 K/UL    ABS. EOSINOPHILS 0.1 0.0 - 0.4 K/UL    ABS. BASOPHILS 0.0 0.0 - 0.1 K/UL    ABS. IMM.  GRANS. 0.0 0.00 - 0.04 K/UL    DF AUTOMATED     METABOLIC PANEL, BASIC    Collection Time: 05/31/18 12:01 PM   Result Value Ref Range    Sodium 142 136 - 145 mmol/L    Potassium 3.6 3.5 - 5.1 mmol/L    Chloride 105 97 - 108 mmol/L    CO2 28 21 - 32 mmol/L    Anion gap 9 5 - 15 mmol/L    Glucose 111 (H) 65 - 100 mg/dL    BUN 19 6 - 20 MG/DL    Creatinine 0.79 0.55 - 1.02 MG/DL    BUN/Creatinine ratio 24 (H) 12 - 20      GFR est AA >60 >60 ml/min/1.73m2    GFR est non-AA >60 >60 ml/min/1.73m2    Calcium 8.8 8.5 - 10.1 MG/DL   VALPROIC ACID    Collection Time: 05/31/18 12:01 PM   Result Value Ref Range    Valproic acid 32 (L) 50 - 100 ug/ml   ETHYL ALCOHOL    Collection Time: 05/31/18 12:01 PM   Result Value Ref Range    ALCOHOL(ETHYL),SERUM <10 <10 MG/DL   URINALYSIS W/ REFLEX CULTURE    Collection Time: 05/31/18 12:35 PM   Result Value Ref Range    Color YELLOW/STRAW      Appearance CLOUDY (A) CLEAR      Specific gravity 1.030 1.003 - 1.030      pH (UA) 6.0 5.0 - 8.0      Protein NEGATIVE  NEG mg/dL    Glucose NEGATIVE  NEG mg/dL    Ketone 15 (A) NEG mg/dL    Bilirubin NEGATIVE  NEG      Blood MODERATE (A) NEG      Urobilinogen 1.0 0.2 - 1.0 EU/dL    Nitrites NEGATIVE  NEG      Leukocyte Esterase SMALL (A) NEG      WBC 0-4 0 - 4 /hpf    RBC 10-20 0 - 5 /hpf    Epithelial cells MODERATE (A) FEW /lpf    Bacteria NEGATIVE  NEG /hpf    UA:UC IF INDICATED CULTURE NOT INDICATED BY UA RESULT CNI      CA Oxalate crystals 1+ (A) NEG   DRUG SCREEN, URINE    Collection Time: 05/31/18 12:35 PM   Result Value Ref Range    AMPHETAMINES NEGATIVE  NEG      BARBITURATES NEGATIVE  NEG      BENZODIAZEPINES NEGATIVE  NEG      COCAINE NEGATIVE  NEG      METHADONE NEGATIVE  NEG      OPIATES NEGATIVE  NEG      PCP(PHENCYCLIDINE) NEGATIVE  NEG      THC (TH-CANNABINOL) NEGATIVE  NEG      Drug screen comment (NOTE)    POC TROPONIN-I    Collection Time: 05/31/18  1:32 PM   Result Value Ref Range    Troponin-I (POC) <0.04 0.00 - 0.08 ng/mL   EKG, 12 LEAD, INITIAL    Collection Time: 05/31/18  1:35 PM   Result Value Ref Range    Ventricular Rate 70 BPM    Atrial Rate 70 BPM    P-R Interval 140 ms    QRS Duration 90 ms    Q-T Interval 422 ms    QTC Calculation (Bezet) 455 ms    Calculated P Axis 52 degrees    Calculated R Axis -27 degrees    Diagnosis       Normal sinus rhythm  Nonspecific T wave abnormality  Abnormal ECG  When compared with ECG of 21-MAY-2018 14:29,  No significant change was found         Radiologic Studies -   XR ABD (KUB)   Final Result        CT Results  (Last 48 hours)    None        CXR Results  (Last 48 hours)    None            Medical Decision Making   I am the first provider for this patient. I reviewed the vital signs, available nursing notes, past medical history, past surgical history, family history and social history. Vital Signs-Reviewed the patient's vital signs. Patient Vitals for the past 12 hrs:   Temp Pulse Resp BP SpO2   05/31/18 1134 98.2 °F (36.8 °C) 72 18 157/86 96 %         EKG interpretation: (Preliminary)  Rhythm: normal sinus rhythm; and regular . Rate (approx.): 70; Axis: normal; SD interval: normal; QRS interval: normal ; ST/T wave: non-specific changes; Other findings: borderline ekg. Written by Yudy Mcnamara PA-C. Records Reviewed: Nursing Notes, Old Medical Records, Previous Radiology Studies and Previous Laboratory Studies    Provider Notes (Medical Decision Making):   DDx: Schizoaffective Disorder, Delusions, Psychosis, UTI, MI, angina, constipation    ED Course:   Initial assessment performed. The patients presenting problems have been discussed, and they are in agreement with the care plan formulated and outlined with them. I have encouraged them to ask questions as they arise throughout their visit. [facility] evaluated pt, and spoke with S[facility]. Someone is coming out to pt's home tomorrow to continue to evaluate her daily and ensure she takes her meds. Discussed discharge with pt. She states she does not what to leave bc she is having contractions and chest pain. Denies SOB, dizziness, blurred vision. Unable to describe chest pain. Pt also reports unsure of last BM. Disposition:  Discussed lab and imaging results with pt along with dx and treatment plan. Discussed importance of  PCP follow up. All questions answered. Pt voiced they understood. Return if sx worsen. PLAN:  1.    Discharge Medication List as of 5/31/2018  1:16 PM      CONTINUE these medications which have NOT CHANGED    Details   lidocaine (LIDODERM) 5 % Apply patch to the affected area for 12 hours a day and remove for 12 hours a day., Print, Disp-6 Each, R-0      HYDROcodone-acetaminophen (NORCO) 5-325 mg per tablet Take 1 Tab by mouth every four (4) hours as needed for Pain. Max Daily Amount: 6 Tabs., Print, Disp-10 Tab, R-0      ibuprofen (MOTRIN) 800 mg tablet Take 800 mg by mouth every eight (8) hours as needed for Pain., Historical Med      metFORMIN (GLUCOPHAGE) 500 mg tablet Take 500 mg by mouth two (2) times daily (with meals). , Historical Med      lovastatin (MEVACOR) 40 mg tablet Take 40 mg by mouth nightly., Historical Med      benztropine (COGENTIN) 0.5 mg tablet Take 0.5 mg by mouth nightly., Historical Med      divalproex ER (DEPAKOTE ER) 500 mg ER tablet Take 500 mg by mouth nightly., Historical Med      escitalopram oxalate (LEXAPRO) 20 mg tablet Take 20 mg by mouth daily. , Historical Med      haloperidol (HALDOL) 10 mg tablet Take 10 mg by mouth nightly., Historical Med      POTASSIUM CHLORIDE SR 10 MEQ TAB 10 mEq daily. , Historical Med      ziprasidone (GEODON) 80 mg capsule Take 80 mg by mouth nightly., Historical Med           2. Follow-up Information     Follow up With Details Comments Contact Richa Hernandez MD Schedule an appointment as soon as possible for a visit As needed [phone]  [phone]        outpatient behavioral health will follow up tomorrow         Return to ED if worse     Diagnosis     Clinical Impression:   1. Schizoaffective disorder, unspecified type ([facility] [address]    2. Delusion of pregnancy    3.  Severe obesity (BMI 35.0-39.9) ([address]

## 2018-06-08 ENCOUNTER — HOSPITAL ENCOUNTER (EMERGENCY)
Age: 58
Discharge: HOME | End: 2018-06-08
Attending: EMERGENCY MEDICINE | Admitting: EMERGENCY MEDICINE
Payer: MEDICARE

## 2018-06-08 VITALS
BODY MASS INDEX: 40.08 KG/M2 | HEIGHT: 64 IN | WEIGHT: 234.79 LBS | HEART RATE: 65 BPM | RESPIRATION RATE: 16 BRPM | SYSTOLIC BLOOD PRESSURE: 147 MMHG | DIASTOLIC BLOOD PRESSURE: 79 MMHG | OXYGEN SATURATION: 99 % | TEMPERATURE: 97.9 F

## 2018-06-08 DIAGNOSIS — F22 DELUSION (HCC): Primary | ICD-10-CM

## 2018-06-08 PROCEDURE — 99285 EMERGENCY DEPT VISIT HI MDM: CPT

## 2018-06-08 PROCEDURE — 74011250637 HC RX REV CODE- 250/637: Performed by: EMERGENCY MEDICINE

## 2018-06-08 PROCEDURE — 90791 PSYCH DIAGNOSTIC EVALUATION: CPT

## 2018-06-08 RX ORDER — FAMOTIDINE 20 MG/1
20 TABLET, FILM COATED ORAL
Status: COMPLETED | OUTPATIENT
Start: 2018-06-08 | End: 2018-06-08

## 2018-06-08 RX ADMIN — FAMOTIDINE 20 MG: 20 TABLET ORAL at 19:51

## 2018-06-08 NOTE — BSMART NOTE
Comprehensive Assessment Form Part 1        Section I - Disposition     Axis I - Schziophernia   Axis II - Deferred  Axis III -  Past Medical History Date Comments        Diabetes ([facility] [address] [E11.9]          Other ill-defined conditions(799.89) [R69]   hypercholesteremia      Schizoaffective disorder ([facility] [address] [F25.9]          High cholesterol [E78.00]          Psychiatric disorder [F99]             Axis IV - Lacks support system  Axis V - 50        The Medical Doctor to Psychiatrist conference was not completed. The Medical Doctor is in agreement with Psychiatrist disposition because of (reason) patient does not require psychiatric hospitalization. The plan is discharge. F/U with [facility] as planned. The on-call Psychiatrist consulted was Dr. Walker Kinds  The admitting Psychiatrist will be Dr. Uma Castellanos. The admitting Diagnosis is none. The Payor source is NA   Section II - Integrated Summary  Summary:  Patient is 66year old AA female reporting to ED; Pt arrives from [facility] via EMS reporting LLQ abdominal pain and bilateral lower back pain \"every Friday because of my pregnancy\". Says that she doesn't understand why this happens all the time and nothing is done about it. Explained to patient that she wasn't pregnant and that she needed to follow up with her PCP/Ob-Gyn about ongoing pain. Patient talks about not having had her cycle in 3yrs \"and surely I must be pregnant\". Still actively involved with [facility] and says that PACT comes to her home every other day. Saw her RN this morning and was given her Rx for this weekend. Admits that sometimes she argues with residents at her assisted. Denies any SI/HI. Denies any SA. Reports her mood has been a little down but \"I manage just fine\". She's preoccupied with her medical complaints at this time. Feels that she's not getting appropriate care. The patienthas demonstrated mental capacity to provide informed consent. The information is given by the patient.   The Chief Complaint is pregnant/ delusional .  The Precipitant Factors are   Previous Hospitalizations: yes  The patient has not previously been in restraints. Current Psychiatrist and/or  is [facility]/ PACT team.     Lethality Assessment:     The potential for suicide noted by the following: not noted . The potential for homicide is not noted. The patient has not been a perpetrator of sexual or physical abuse. There are not pending charges. The patient is not felt to be at risk for self harm or harm to others. The attending nurse was advised not noted.     Section III - Psychosocial  The patient's overall mood and attitude is calm and cooperative. Feelings of helplessness and hopelessness are not observed. Generalized anxiety is not observed. Panic is not observed. Phobias are not observed. Obsessive compulsive tendencies are not observed.       Section IV - Mental Status Exam  The patient's appearance shows no evidence of impairment. The patient's behavior shows no evidence of impairment. The patient is oriented to time, place, person and situation. The patient's speech shows no evidence of impairment. The patient's mood is euthymic. The range of affect shows no evidence of impairment. The patient's thought content demonstrates no evidence of impairment. The thought process shows no evidence of impairment. The patient's perception shows no evidence of impairment. The patient's memory shows no evidence of impairment. The patient's appetite shows no evidence of impairment. The patient's sleep shows no evidence of impairment. The patient's insight shows no evidence of impairment. The patient's judgement shows no evidence of impairment.             Section V - Substance Abuse  The patient is not using substances. The patient is using not noted. The patient has experienced the following withdrawal symptoms: N/A.        Section VI - Living Arrangements  The patient is single. The patient lives in a group home. The patient has 2 children ages adults. The patient does plan to return home upon discharge. The patient does not have legal issues pending. The patient's source of income comes from disability. Worship and cultural practices have not been voiced at this time.     The patient's greatest support comes from community [facility] and this person will be involved with the treatment. The patient has not been in an event described as horrible or outside the realm of ordinary life experience either currently or in the past. The patient has not been a victim of sexual/physical abuse.     Section VII - Other Areas of Clinical Concern  The highest grade achieved is 12th with the overall quality of school experience being described as unknown. The patient is currently unemployed and speaks [place] as a primary language. The patient has no communication impairments affecting communication. The patient's preference for learning can be described as: can read and write adequately.   The patient's hearing is normal.  The patient's vision is normal.    Leeanna Sam, [facility]

## 2018-06-08 NOTE — ED TRIAGE NOTES
Pt arrived via ems complaints of back pain 4/10. Pt states that the back pain is due to her pregnancy every Friday. Pt states no period for 3 years. EMS bs 99 bp 110/70s, HR 60s, 100% on room air.

## 2018-06-08 NOTE — ED PROVIDER NOTES
HPI Comments: 66 y.o. female with past medical history significant for DM, hypercholesterolemia, schizoaffective disorder, anxiety, and bipolar disorder who presents via EMS with chief complaint of mental health problem. EMS personnel report picking up the patient for a complaint of back pain. They report the patient's heart rate was \"in the 60s,\" blood pressure was \"110/70,\" blood sugar level was \"99,\" and SpO2 was \"100% on room air. \" Patient reports living in an assisted living facility. Patient reports eating a lot of food and falling asleep while sitting up \"earlier today. \" Patient reports being pregnant \"for the last three years,\" reports having back pain \"every Friday\" due to her pregnancy. Patient reports her LMP was ~3 years ago. Patient reports going to [address] [place] [address] every Sunday. Patient reports being compliant with her regular medications. There are no other acute medical concerns at this time. Old Chart Review: Patient had labs drawn on the 21st, 25th, and 31st of the previous month; last labs were drawn ~8 days ago. PCP: Rafaela Sandra MD    Note written by Mercy Morales, as dictated by Reyes Sierras, MD 5:22 PM     The history is provided by the patient. Past Medical History:   Diagnosis Date    Diabetes ([facility] [address]     High cholesterol     Other ill-defined conditions(799.89)     hypercholesteremia    Psychiatric disorder     SCHIZOAFFECTIVE DISORDER, anxiety, bipolar    Schizoaffective disorder ([facility] [address]        Past Surgical History:   Procedure Laterality Date    HX CHOLECYSTECTOMY  03/03/2017    [facility]    HX TUBAL LIGATION  1991         History reviewed. No pertinent family history. Social History     Social History    Marital status:      Spouse name: N/A    Number of children: N/A    Years of education: N/A     Occupational History    Not on file.      Social History Main Topics    Smoking status: Never Smoker    Smokeless tobacco: Never Used    Alcohol use No    Drug use: No    Sexual activity: Not on file     Other Topics Concern    Not on file     Social History Narrative         ALLERGIES: Penicillins    Review of Systems   Constitutional: Negative for appetite change, chills and fever. HENT: Negative for rhinorrhea, sore throat and trouble swallowing. Eyes: Negative for photophobia. Respiratory: Negative for cough and shortness of breath. Cardiovascular: Negative for chest pain and palpitations. Gastrointestinal: Negative for abdominal pain, nausea and vomiting. Genitourinary: Negative for dysuria, frequency and hematuria. Musculoskeletal: Positive for back pain. Negative for arthralgias. Neurological: Negative for dizziness, syncope and weakness. Psychiatric/Behavioral: Negative for behavioral problems. The patient is not nervous/anxious. All other systems reviewed and are negative. Vitals:    06/08/18 1704   BP: 130/67   Pulse: 65   Resp: 16   Temp: 97.9 °F (36.6 °C)   SpO2: 97%   Weight: 106.5 kg (234 lb 12.6 oz)   Height: 5' 4\" (1.626 m)            Physical Exam   Constitutional: She appears well-developed. Obese with muscle deconditioning; truncal obesity. HENT:   Head: Normocephalic and atraumatic. Mouth/Throat: Oropharynx is clear and moist.   Poor dentition, especially of the upper front teeth. Eyes: EOM are normal. Pupils are equal, round, and reactive to light. Neck: Normal range of motion. Neck supple. Cardiovascular: Normal rate, regular rhythm, normal heart sounds and intact distal pulses. Exam reveals no gallop and no friction rub. No murmur heard. Pulmonary/Chest: Effort normal. No respiratory distress. She has no wheezes. She has no rales. Abdominal: Soft. There is no tenderness. There is no rebound. Musculoskeletal: Normal range of motion. She exhibits no tenderness. Neurological: She is alert. No cranial nerve deficit.    Motor; symmetric   Skin: No erythema. Psychiatric: She has a normal mood and affect. Her behavior is normal.   Nursing note and vitals reviewed.   Note written by Mercy Moreau, as dictated by Gordon King MD 5:32 PM      [facility]      ED Course       Procedures

## 2018-06-08 NOTE — ED NOTES
Called for medicaid/medicare cab. Cab confirmation [id number]. Estimated time 30 mins- 3hours. Called at 7:10pm. Discharge papers in room. Awaiting ride.

## 2018-06-08 NOTE — ED NOTES
CONSULT NOTE:  6:32 PM Mayank Baptiste MD spoke with Oneyda Ornelas, Consult for [facility]. Discussed available diagnostic tests and clinical findings. Provider is in agreement with care plans as outlined. Oneyda Ornelas has evaluated and cleared the patient for discharge.

## 2018-06-10 ENCOUNTER — HOSPITAL ENCOUNTER (EMERGENCY)
Age: 58
Discharge: HOME | End: 2018-06-10
Attending: EMERGENCY MEDICINE
Payer: MEDICARE

## 2018-06-10 ENCOUNTER — APPOINTMENT (OUTPATIENT)
Dept: CT IMAGING | Age: 58
End: 2018-06-10
Attending: PHYSICIAN ASSISTANT
Payer: MEDICARE

## 2018-06-10 VITALS
DIASTOLIC BLOOD PRESSURE: 87 MMHG | RESPIRATION RATE: 18 BRPM | SYSTOLIC BLOOD PRESSURE: 148 MMHG | TEMPERATURE: 97.9 F | OXYGEN SATURATION: 100 % | HEART RATE: 63 BPM

## 2018-06-10 DIAGNOSIS — R10.32 ABDOMINAL PAIN, LLQ (LEFT LOWER QUADRANT): Primary | ICD-10-CM

## 2018-06-10 LAB
ANION GAP BLD CALC-SCNC: 14 MMOL/L (ref 10–20)
BUN BLD-MCNC: 18 MG/DL (ref 9–20)
CA-I BLD-MCNC: 1.1 MMOL/L (ref 1.12–1.32)
CHLORIDE BLD-SCNC: 104 MMOL/L (ref 98–107)
CO2 BLD-SCNC: 27 MMOL/L (ref 21–32)
CREAT BLD-MCNC: 0.6 MG/DL (ref 0.6–1.3)
GLUCOSE BLD-MCNC: 87 MG/DL (ref 65–100)
HCT VFR BLD CALC: 36 % (ref 35–47)
POTASSIUM BLD-SCNC: 4 MMOL/L (ref 3.5–5.1)
SERVICE CMNT-IMP: ABNORMAL
SODIUM BLD-SCNC: 140 MMOL/L (ref 136–145)

## 2018-06-10 PROCEDURE — 74177 CT ABD & PELVIS W/CONTRAST: CPT

## 2018-06-10 PROCEDURE — 74011636320 HC RX REV CODE- 636/320

## 2018-06-10 PROCEDURE — 80047 BASIC METABLC PNL IONIZED CA: CPT

## 2018-06-10 PROCEDURE — 99284 EMERGENCY DEPT VISIT MOD MDM: CPT

## 2018-06-10 RX ORDER — SODIUM CHLORIDE 0.9 % (FLUSH) 0.9 %
10 SYRINGE (ML) INJECTION
Status: DISCONTINUED | OUTPATIENT
Start: 2018-06-10 | End: 2018-06-10 | Stop reason: HOSPADM

## 2018-06-10 NOTE — ED PROVIDER NOTES
HPI Comments: 66year old female hx schizoaffective disorder, high cholesterol, DM, high cholesterol presenting for abd pain. Pt somewhat poor historian related to psych disorder, developmental delay. Reports abdominal pain that started this morning after [facility]. Pt points to her entire abdomen but notes that pain is worse on the left side. Denies N/V/D/C, melena, hematochezia, CP, SOB, urinary symptoms. Old charts indicate that pt often has delusions of pregnancy but did not voice any such concerns today. Unsure of fever. No medications taken PTA. No other concerns. PMHx: as above  PSx: cholecystectomy, tubal ligation      Patient is a 66 y.o. female presenting with abdominal pain. The history is provided by the patient and medical records. Abdominal Pain    Pertinent negatives include no vomiting, no dysuria and no chest pain. Past Medical History:   Diagnosis Date    [facility] [address]     High cholesterol     Other ill-defined conditions(799.89)     hypercholesteremia    Psychiatric disorder     SCHIZOAFFECTIVE DISORDER, anxiety, bipolar    Schizoaffective disorder ([facility] [address]        Past Surgical History:   Procedure Laterality Date    [facility]  [facility]    HX TUBAL LIGATION  1991         History reviewed. No pertinent family history. Social History     Social History    Marital status:      Spouse name: N/A    Number of children: N/A    Years of education: N/A     Occupational History    Not on file. Social History Main Topics    Smoking status: Never Smoker    Smokeless tobacco: Never Used    Alcohol use No    Drug use: No    Sexual activity: Not on file     Other Topics Concern    Not on file     Social History Narrative         ALLERGIES: Penicillins    Review of Systems   Unable to perform ROS: Psychiatric disorder   Respiratory: Negative for shortness of breath. Cardiovascular: Negative for chest pain. Gastrointestinal: Positive for abdominal pain. Negative for vomiting. Genitourinary: Negative for dysuria. Vitals:    06/10/18 1541   BP: (!) 142/118   Pulse: (!) 59   Resp: 16   Temp: 98.4 °F (36.9 °C)   SpO2: 100%            Physical Exam   Constitutional: She is oriented to person, place, and time. She appears well-developed and well-nourished. No distress. Developmentally delayed AA female   HENT:   Head: Normocephalic and atraumatic. Right Ear: External ear normal.   Left Ear: External ear normal.   Eyes: Conjunctivae are normal. No scleral icterus. Neck: Neck supple. No tracheal deviation present. Cardiovascular: Normal rate, regular rhythm and normal heart sounds. Exam reveals no gallop and no friction rub. No murmur heard. Pulmonary/Chest: Effort normal and breath sounds normal. No stridor. No respiratory distress. She has no wheezes. Abdominal: Soft. She exhibits no distension. Soft, obese  Very distractible during exam, does wince somewhat on palpation of the LLQ  No CVAT   Musculoskeletal: Normal range of motion. Neurological: She is alert and oriented to person, place, and time. Skin: Skin is warm and dry. Psychiatric: She has a normal mood and affect. Her behavior is normal.   Nursing note and vitals reviewed. MDM  Number of Diagnoses or Management Options  Abdominal pain, LLQ (left lower quadrant):   Diagnosis management comments: 66year old female presenting for left sided abd pain that started today. Hx schizoaffective disorder, several visits for the same in the last couple of weeks. Normal recent labs, UA. Given recurrence of pain, ? LLQ TTP on exam, CT ordered which was normal.  Discussed possible causes of pain, encouraged PCP f/u, return precautions given.        Amount and/or Complexity of Data Reviewed  Clinical lab tests: ordered and reviewed  Tests in the radiology section of CPT®: ordered and reviewed  Review and summarize past medical records: yes  Discuss the patient with other providers: yes (Dr. Tariq Conroy, ED attending)          ED Course       Procedures         Pt stating that she is hungry and ready to eat. Discussed results.   Discussed return precautions and need for PCP f/u  CHAPITO Posadas  7:06 PM

## 2018-06-10 NOTE — ED NOTES
Present with Dr. Mariela Celaya during review of lab results and during review of discharge instructions. Patient with inconsistencies, first stating to nurse and MD that she was in [facility]), next stating that she was in [place], [place]. Patient denied being seen at [facility] or [facility] recently. Discharge instructions presented. Patient removed IV and threw it as nurse was standing by the door. Patient asked to please not throw anything in general direction of nurse. Patient stated she threw it in the trash. Patient presented with discharge instructions. Patient removed patient ID band and dressed and ambulated out.

## 2018-06-10 NOTE — ED NOTES
Patient received discharged paperwork. Patient able to tolerate lean cuisine meal.   Nicolas contacted for transport home. ETA 6 mins.

## 2018-06-10 NOTE — ED TRIAGE NOTES
Pt has had abd pain x3 days. Was seen here Friday. 1/10 abd pain periumbilically. Pt is 8 months pregnant according to her. Pt following commands, in no visible distress, up ad chanel. A&ox4. Pt from home.

## 2018-06-10 NOTE — DISCHARGE INSTRUCTIONS
Abdominal Pain: Care Instructions  Your Care Instructions    Abdominal pain has many possible causes. Some aren't serious and get better on their own in a few days. Others need more testing and treatment. If your pain continues or gets worse, you need to be rechecked and may need more tests to find out what is wrong. You may need surgery to correct the problem. Don't ignore new symptoms, such as fever, nausea and vomiting, urination problems, pain that gets worse, and dizziness. These may be signs of a more serious problem. Your doctor may have recommended a follow-up visit in the next 8 to 12 hours. If you are not getting better, you may need more tests or treatment. The doctor has checked you carefully, but problems can develop later. If you notice any problems or new symptoms, get medical treatment right away. Follow-up care is a key part of your treatment and safety. Be sure to make and go to all appointments, and call your doctor if you are having problems. It's also a good idea to know your test results and keep a list of the medicines you take. How can you care for yourself at home? · Rest until you feel better. · To prevent dehydration, drink plenty of fluids, enough so that your urine is light yellow or clear like water. Choose water and other caffeine-free clear liquids until you feel better. If you have kidney, heart, or liver disease and have to limit fluids, talk with your doctor before you increase the amount of fluids you drink. · If your stomach is upset, eat mild foods, such as rice, dry toast or crackers, bananas, and applesauce. Try eating several small meals instead of two or three large ones. · Wait until 48 hours after all symptoms have gone away before you have spicy foods, alcohol, and drinks that contain caffeine. · Do not eat foods that are high in fat. · Avoid anti-inflammatory medicines such as aspirin, ibuprofen (Advil, Motrin), and naproxen (Aleve).  These can cause stomach upset. Talk to your doctor if you take daily aspirin for another health problem. When should you call for help? Call 911 anytime you think you may need emergency care. For example, call if:  ? · You passed out (lost consciousness). ? · You pass maroon or very bloody stools. ? · You vomit blood or what looks like coffee grounds. ? · You have new, severe belly pain. ?Call your doctor now or seek immediate medical care if:  ? · Your pain gets worse, especially if it becomes focused in one area of your belly. ? · You have a new or higher fever. ? · Your stools are black and look like tar, or they have streaks of blood. ? · You have unexpected vaginal bleeding. ? · You have symptoms of a urinary tract infection. These may include:  ¨ Pain when you urinate. ¨ Urinating more often than usual.  ¨ Blood in your urine. ? · You are dizzy or lightheaded, or you feel like you may faint. ? Watch closely for changes in your health, and be sure to contact your doctor if:  ? · You are not getting better after 1 day (24 hours). Where can you learn more? Go to http://sterling-ishaan.info/. Enter S660 in the search box to learn more about \"Abdominal Pain: Care Instructions. \"  Current as of: March 20, 2017  Content Version: 11.4  © 2756-1216 [employer]. Care instructions adapted under license by [employer] (which disclaims liability or warranty for this information). If you have questions about a medical condition or this instruction, always ask your healthcare professional. [place] [address] any warranty or liability for your use of this information.

## 2018-09-28 ENCOUNTER — HOSPITAL ENCOUNTER (EMERGENCY)
Age: 58
Discharge: HOME | End: 2018-09-28
Attending: EMERGENCY MEDICINE
Payer: MEDICARE

## 2018-09-28 ENCOUNTER — APPOINTMENT (OUTPATIENT)
Dept: CT IMAGING | Age: 58
End: 2018-09-28
Attending: NURSE PRACTITIONER
Payer: MEDICARE

## 2018-09-28 VITALS
HEIGHT: 64 IN | RESPIRATION RATE: 18 BRPM | TEMPERATURE: 98.3 F | SYSTOLIC BLOOD PRESSURE: 133 MMHG | DIASTOLIC BLOOD PRESSURE: 78 MMHG | WEIGHT: 234 LBS | BODY MASS INDEX: 39.95 KG/M2 | HEART RATE: 87 BPM | OXYGEN SATURATION: 99 %

## 2018-09-28 DIAGNOSIS — R32 URINARY INCONTINENCE, UNSPECIFIED TYPE: ICD-10-CM

## 2018-09-28 DIAGNOSIS — R44.3 HALLUCINATIONS: Primary | ICD-10-CM

## 2018-09-28 LAB
ALBUMIN SERPL-MCNC: 3.9 G/DL (ref 3.5–5)
ALBUMIN/GLOB SERPL: 1 {RATIO} (ref 1.1–2.2)
ALP SERPL-CCNC: 84 U/L (ref 45–117)
ALT SERPL-CCNC: 31 U/L (ref 12–78)
ANION GAP SERPL CALC-SCNC: 10 MMOL/L (ref 5–15)
APPEARANCE UR: CLEAR
AST SERPL-CCNC: 27 U/L (ref 15–37)
BACTERIA URNS QL MICRO: NEGATIVE /HPF
BASOPHILS # BLD: 0 K/UL (ref 0–0.1)
BASOPHILS NFR BLD: 0 % (ref 0–1)
BILIRUB SERPL-MCNC: 0.4 MG/DL (ref 0.2–1)
BILIRUB UR QL: NEGATIVE
BUN SERPL-MCNC: 12 MG/DL (ref 6–20)
BUN/CREAT SERPL: 16 (ref 12–20)
CALCIUM SERPL-MCNC: 9.1 MG/DL (ref 8.5–10.1)
CHLORIDE SERPL-SCNC: 103 MMOL/L (ref 97–108)
CO2 SERPL-SCNC: 25 MMOL/L (ref 21–32)
COLOR UR: ABNORMAL
COMMENT, HOLDF: NORMAL
CREAT SERPL-MCNC: 0.73 MG/DL (ref 0.55–1.02)
DIFFERENTIAL METHOD BLD: NORMAL
EOSINOPHIL # BLD: 0.1 K/UL (ref 0–0.4)
EOSINOPHIL NFR BLD: 2 % (ref 0–7)
EPITH CASTS URNS QL MICRO: ABNORMAL /LPF
ERYTHROCYTE [DISTWIDTH] IN BLOOD BY AUTOMATED COUNT: 13.1 % (ref 11.5–14.5)
GLOBULIN SER CALC-MCNC: 3.8 G/DL (ref 2–4)
GLUCOSE SERPL-MCNC: 135 MG/DL (ref 65–100)
GLUCOSE UR STRIP.AUTO-MCNC: 100 MG/DL
HCT VFR BLD AUTO: 38.3 % (ref 35–47)
HGB BLD-MCNC: 12.7 G/DL (ref 11.5–16)
HGB UR QL STRIP: NEGATIVE
HYALINE CASTS URNS QL MICRO: ABNORMAL /LPF (ref 0–5)
IMM GRANULOCYTES # BLD: 0 K/UL (ref 0–0.04)
IMM GRANULOCYTES NFR BLD AUTO: 0 % (ref 0–0.5)
KETONES UR QL STRIP.AUTO: NEGATIVE MG/DL
LEUKOCYTE ESTERASE UR QL STRIP.AUTO: ABNORMAL
LYMPHOCYTES # BLD: 3.4 K/UL (ref 0.8–3.5)
LYMPHOCYTES NFR BLD: 41 % (ref 12–49)
MCH RBC QN AUTO: 32.4 PG (ref 26–34)
MCHC RBC AUTO-ENTMCNC: 33.2 G/DL (ref 30–36.5)
MCV RBC AUTO: 97.7 FL (ref 80–99)
MONOCYTES # BLD: 0.6 K/UL (ref 0–1)
MONOCYTES NFR BLD: 7 % (ref 5–13)
NEUTS SEG # BLD: 4 K/UL (ref 1.8–8)
NEUTS SEG NFR BLD: 50 % (ref 32–75)
NITRITE UR QL STRIP.AUTO: NEGATIVE
NRBC # BLD: 0 K/UL (ref 0–0.01)
NRBC BLD-RTO: 0 PER 100 WBC
PH UR STRIP: 5.5 [PH] (ref 5–8)
PLATELET # BLD AUTO: 207 K/UL (ref 150–400)
PMV BLD AUTO: 11.1 FL (ref 8.9–12.9)
POTASSIUM SERPL-SCNC: 3.9 MMOL/L (ref 3.5–5.1)
PROT SERPL-MCNC: 7.7 G/DL (ref 6.4–8.2)
PROT UR STRIP-MCNC: NEGATIVE MG/DL
RBC # BLD AUTO: 3.92 M/UL (ref 3.8–5.2)
RBC #/AREA URNS HPF: ABNORMAL /HPF (ref 0–5)
SAMPLES BEING HELD,HOLD: NORMAL
SODIUM SERPL-SCNC: 138 MMOL/L (ref 136–145)
SP GR UR REFRACTOMETRY: 1.01 (ref 1–1.03)
UR CULT HOLD, URHOLD: NORMAL
UROBILINOGEN UR QL STRIP.AUTO: 1 EU/DL (ref 0.2–1)
WBC # BLD AUTO: 8.2 K/UL (ref 3.6–11)
WBC URNS QL MICRO: ABNORMAL /HPF (ref 0–4)

## 2018-09-28 PROCEDURE — 36415 COLL VENOUS BLD VENIPUNCTURE: CPT | Performed by: EMERGENCY MEDICINE

## 2018-09-28 PROCEDURE — 70450 CT HEAD/BRAIN W/O DYE: CPT

## 2018-09-28 PROCEDURE — 99284 EMERGENCY DEPT VISIT MOD MDM: CPT

## 2018-09-28 PROCEDURE — 81001 URINALYSIS AUTO W/SCOPE: CPT | Performed by: EMERGENCY MEDICINE

## 2018-09-28 PROCEDURE — 85025 COMPLETE CBC W/AUTO DIFF WBC: CPT | Performed by: EMERGENCY MEDICINE

## 2018-09-28 PROCEDURE — 90791 PSYCH DIAGNOSTIC EVALUATION: CPT

## 2018-09-28 PROCEDURE — 87086 URINE CULTURE/COLONY COUNT: CPT | Performed by: NURSE PRACTITIONER

## 2018-09-28 PROCEDURE — 80053 COMPREHEN METABOLIC PANEL: CPT | Performed by: EMERGENCY MEDICINE

## 2018-09-28 NOTE — ED TRIAGE NOTES
Urinary frequency with incontinence onset today. \"I'm going through menopause but think I'm pregnant because I haven't had a period and my stomach is getting big\" Denies N/V/D or fever. Pt has psychiatric hx and appears anxious and delusional in triage. Denies SI/HI.

## 2018-09-28 NOTE — Clinical Note
Thank you for allowing us to care for you today. Please follow-up with your Primary Care provider in the next 2-3 days if your symptoms do not improve. Plan for home:  
 
Please be certain you are taking your antipsychotic medications.  Please follow -up with your psychiatrist.

## 2018-09-29 ENCOUNTER — HOSPITAL ENCOUNTER (EMERGENCY)
Age: 58
Discharge: HOME | End: 2018-09-29
Attending: EMERGENCY MEDICINE

## 2018-09-29 NOTE — ED NOTES
MD reviewed discharge instructions and options with patient and patient verbalized understanding. RN reviewed discharge instructions using teachback method. Pt ambulated to exit without difficulty and in no signs of acute distress escorted by self, and medicaid  will drive home. No complaints or needs expressed at this time. Patient was counseled on medications prescribed at discharge. VSS, verbalized relief from most intense pain. Patient to call PCP  in the morning for appointment. [facility] transport set up home, address verified with patient.  Confirmation #[id number]

## 2018-09-29 NOTE — ED PROVIDER NOTES
HPI Comments: 66 y.o. female with past medical history significant for bipolar disorder, anxiety, and schizoaffective disorder who presents from a group home via EMS for a mental health problem. Pt persistently states that \"she was pregnant\" and \"had three more months to go. \" Pt is also reporting that she  \"had the baby today here. \" Pt states she is lactating which she described as \"leaking from her breasts off and on\". Pt states her last normal menstrual period was a \"while ago\" before menopause. Pt also complains of some back pain and two episodes of urinary incontinence today. Pt states she is [place] a hard time breathing\" as well. Pt denies any dysuria, fever, nausea, and vomiting. There are no other acute medical concerns at this time. Social hx - Tobacco use: none, Alcohol Use: none    PCP: Jamshid King MD    Note written by Mercy Blake, as dictated by Ernesto Braswell NP 8:20 PM.        The history is provided by the patient. No  was used. Past Medical History:   Diagnosis Date    Diabetes ([address]     High cholesterol     Other ill-defined conditions(799.89)     hypercholesteremia    Psychiatric disorder     SCHIZOAFFECTIVE DISORDER, anxiety, bipolar    Schizoaffective disorder ([address]        Past Surgical History:   Procedure Laterality Date    HX CHOLECYSTECTOMY  03/03/2017    [facility]    HX TUBAL LIGATION  1991         History reviewed. No pertinent family history. Social History     Social History    Marital status:      Spouse name: N/A    Number of children: N/A    Years of education: N/A     Occupational History    Not on file.      Social History Main Topics    Smoking status: Never Smoker    Smokeless tobacco: Never Used    Alcohol use No    Drug use: No    Sexual activity: Not on file     Other Topics Concern    Not on file     Social History Narrative         ALLERGIES: Penicillins    Review of Systems   Constitutional: Negative for appetite change, chills and fever. HENT: Negative. Negative for ear pain and sore throat. Eyes: Negative for pain. Respiratory: Negative for cough, chest tightness, shortness of breath and wheezing. Cardiovascular: Negative for chest pain and leg swelling. Gastrointestinal: Negative for abdominal pain, constipation, diarrhea, nausea and vomiting. Genitourinary: Positive for enuresis. Negative for dysuria, flank pain, frequency and urgency. Musculoskeletal: Positive for back pain. Skin: Negative for color change and rash. Neurological: Negative for dizziness and headaches. Psychiatric/Behavioral: Negative. All other systems reviewed and are negative. Vitals:    09/28/18 1829   BP: 137/81   Pulse: 89   Resp: 20   Temp: 98.9 °F (37.2 °C)   SpO2: 98%   Weight: 106.1 kg (234 lb)   Height: 5' 4\" (1.626 m)            Physical Exam   Constitutional: She is oriented to person, place, and time. She appears well-developed and well-nourished. HENT:   Head: Normocephalic and atraumatic. Eyes: Conjunctivae are normal.   Neck: Normal range of motion. Neck supple. Cardiovascular: Normal rate, regular rhythm, normal heart sounds and intact distal pulses. Pulmonary/Chest: Effort normal and breath sounds normal. No respiratory distress. She has no wheezes. She has no rales. She exhibits no tenderness. Abdominal: Soft. Bowel sounds are normal. She exhibits no distension and no mass. There is no tenderness. There is no rebound and no guarding. Musculoskeletal: Normal range of motion. Neurological: She is alert and oriented to person, place, and time. Skin: Skin is warm and dry. No erythema. Psychiatric: Her speech is normal. Her affect is labile. She is actively hallucinating. Thought content is delusional. Cognition and memory are impaired. Nursing note and vitals reviewed.      [facility]      ED Course     Assessment & Plan:     Orders Placed This Encounter    URINE CULTURE HOLD SAMPLE    CULTURE, URINE    CT HEAD WO CONT    URINALYSIS W/MICROSCOPIC    Hold Sample    CBC WITH AUTOMATED DIFF    METABOLIC PANEL, COMPREHENSIVE    INSERT PERIPHERAL IV ONE TIME STAT    IP CONSULT TO BSMART     Seen by BSMART. Cleared to return to group home. Urine culture pending. No treatment. Will wait for culture results. Head Ct without acute findings. Has metallic object in head. Will NOT be able to have MRI's in future. No changes to medications. Discussed with Kerwin Whelan MD,ED Provider    Edgardo Simmons NP  09/28/18      10:33 PM  The patient has been reevaluated. The patient is ready for discharge. The patient's signs, symptoms, diagnosis, and discharge instructions have been discussed and the patient/ family has conveyed their understanding. The patient is to follow up as recommended or return to the ED should their symptoms worsen. Plan has been discussed and the patient is in agreement. LABORATORY TESTS:  Labs Reviewed   URINALYSIS W/MICROSCOPIC - Abnormal; Notable for the following:        Result Value    Glucose 100 (*)     Leukocyte Esterase MODERATE (*)     All other components within normal limits   METABOLIC PANEL, COMPREHENSIVE - Abnormal; Notable for the following:     Glucose 135 (*)     A-G Ratio 1.0 (*)     All other components within normal limits   URINE CULTURE HOLD SAMPLE   CULTURE, URINE   SAMPLES BEING HELD   CBC WITH AUTOMATED DIFF       IMAGING RESULTS:  Ct Head Wo Cont    Result Date: 9/28/2018  EXAM:  CT HEAD WO CONT INDICATION:   Hallucinations, possibly lactating COMPARISON: None. TECHNIQUE: Unenhanced CT of the head was performed using 5 mm images. Brain and bone windows were generated. CT dose reduction was achieved through use of a standardized protocol tailored for this examination and automatic exposure control for dose modulation. FINDINGS: The ventricles are normal in size and position.  Basilar cisterns are patent. No midline shift. There is no evidence of acute infarct, hemorrhage, or extraaxial fluid collection. The paranasal sinuses, mastoid air cells, and middle ears are clear. There is a 4.5 mm left intraorbital metallic foreign body within the left posterior orbit, which appears to lie in the intraconal fat adjacent to the lateral rectus muscle. There are no significant osseous or extracranial soft tissue lesions. IMPRESSION: 1. No evidence of acute intracranial abnormality. 2. A 4.5 mm intraorbital metallic foreign body within the left posterior orbit. Note that this precludes the patient from getting an MRI in the future. 23X         MEDICATIONS GIVEN:  Medications - No data to display    IMPRESSION:  1. Hallucinations    2. Urinary incontinence, unspecified type        PLAN:  1. Current Discharge Medication List      CONTINUE these medications which have NOT CHANGED    Details   lidocaine (LIDODERM) 5 % Apply patch to the affected area for 12 hours a day and remove for 12 hours a day. Qty: 6 Each, Refills: 0      HYDROcodone-acetaminophen (NORCO) 5-325 mg per tablet Take 1 Tab by mouth every four (4) hours as needed for Pain. Max Daily Amount: 6 Tabs. Qty: 10 Tab, Refills: 0      ibuprofen (MOTRIN) 800 mg tablet Take 800 mg by mouth every eight (8) hours as needed for Pain.      metFORMIN (GLUCOPHAGE) 500 mg tablet Take 500 mg by mouth two (2) times daily (with meals). lovastatin (MEVACOR) 40 mg tablet Take 40 mg by mouth nightly. benztropine (COGENTIN) 0.5 mg tablet Take 0.5 mg by mouth nightly. divalproex ER (DEPAKOTE ER) 500 mg ER tablet Take 500 mg by mouth nightly. escitalopram oxalate (LEXAPRO) 20 mg tablet Take 20 mg by mouth daily. haloperidol (HALDOL) 10 mg tablet Take 10 mg by mouth nightly. POTASSIUM CHLORIDE SR 10 MEQ TAB 10 mEq daily. ziprasidone (GEODON) 80 mg capsule Take 80 mg by mouth nightly.            2.   Follow-up Information     Follow up With Details Comments Contact Info    Enrike Fagan MD Schedule an appointment as soon as possible for a visit and follow-up with 1 week Deyanira Jayesh [address]      [address], [facility] DEP  As needed, If symptoms worsen [facility]  [phone]        3.      Return to ED for new or worsening symptoms       Moira Heredia NP        Procedures

## 2018-09-29 NOTE — ED NOTES
Discharge instructions given to patient by MD/RN. Patient verbalizes understanding of discharge instructions and medications. IV discontinued. Questions answered. Patient ambulated off unit with no signs of acute distress. Home to self by cab.

## 2018-09-29 NOTE — ED NOTES
Called for updated ETANicolas reports cab should be here at [address]. Patient and waiting room staff made aware.

## 2018-09-29 NOTE — BSMART NOTE
Section I - Disposition      Axis I - Schizophrenia  Axis II - Deferred  Axis III -  Past Medical History Date Comments         Diabetes ([facility] [address] [E11.9]            Other ill-defined conditions(799.89) [R69]    hypercholesteremia      Schizoaffective disorder ([facility] [address] [F25.9]            High cholesterol [E78.00]            Psychiatric disorder [F99]                Axis IV - Lacks support system  Axis V - 55          The Medical Doctor to Psychiatrist conference was not completed. Maria Fernanda Alvarado Doctor is in agreement with Psychiatrist disposition because of (reason) patient doesn't warrant inpatient care  The plan is discharge. F/U with [facility] as planned. On Call PACT worker, Zak Marquez, will come and see patient tomorrow. The on-call Psychiatrist consulted was [facility]  The admitting Psychiatrist will be [facility]  The admitting Diagnosis is none. The Payor source is NA   Section II - Integrated Summary  Summary: Patient is known to this therapist from previous ER visits. She has a fixed delusion about being pregnant and today says that she has been lactating. In addition she says that she is having convulsions and hasn't been able stop moving. Patient denies any changes at  [facility]. She says that she was supposed to clean her room today and that she didn't have the energy to do so. PC with Norma from [facility]. Patient has been stable during the past week. She's been medication compliant and denies any concerns that they have noted. She will visit patient tomorrow at home. The patienthas demonstrated mental capacity to provide informed consent. The information is given by the patient. The Chief Complaint is \"I was lactating today and don't have supplies for my pregnancy\". The Precipitant Factors are unknown   Previous Hospitalizations: yes  The patient has not previously been in restraints.   Current Psychiatrist and/or  is [facility]/ PACT team.      Lethality Assessment:      The potential for suicide noted by the following: not noted .  The potential for homicide is not noted. The patient has not been a perpetrator of sexual or physical abuse. Carter Buddle are not pending charges. The patient is not felt to be at risk for self harm or harm to others. [address] attending nurse was advised not noted.      Section III - Psychosocial  The patient's overall mood and attitude is calm and cooperative.  Feelings of helplessness and hopelessness are not observed.  Generalized anxiety is not observed.  Panic is not observed. Phobias are not observed. Gelene Fray compulsive tendencies are not observed.        Section IV - Mental Status Exam  The patient's appearance shows no evidence of impairment.  The patient's behavior shows no evidence of impairment. The patient is oriented to time, place, person and situation.  The patient's speech shows no evidence of impairment.  The patient's mood is euthymic.  The range of affect shows no evidence of impairment.  The patient's thought content demonstrates no evidence of impairment.  The thought process shows no evidence of impairment.  The patient's perception shows no evidence of impairment. The patient's memory shows no evidence of impairment.  The patient's appetite shows no evidence of impairment.  The patient's sleep shows no evidence of impairment. The patient's insight shows no evidence of impairment.  The patient's judgement shows no evidence of impairment.                Section V - Substance Abuse  The patient is not using substances.  The patient is using not noted. The patient has experienced the following withdrawal symptoms: N/A.          Section VI - Living Arrangements  The patient is single.  The patient lives in a group home. The patient has 2 children ages adults.  The patient does plan to return home upon discharge. The patient does not have legal issues pending. The patient's source of income comes from disability.  [employer] and cultural practices have not been voiced at this time.     The patient's greatest support comes from community supports Filipe Georges this person will be involved with the treatment.  The patient has not been in an event described as horrible or outside the realm of ordinary life experience either currently or in the past. The patient has not been a victim of sexual/physical abuse.      Section VII - Other Areas of Clinical Concern  The highest grade achieved is 12th with the overall quality of school experience being described as unknown. The patient is currently unemployed and speaks English as a primary language.  The patient has no communication impairments affecting communication.  The patient's preference for learning can be described as: can read and write adequately. Jacqueline Rosi patient's hearing is normal.  The patient's vision is normal.     Jayy Sam [facility]

## 2018-09-30 LAB
BACTERIA SPEC CULT: NORMAL
CC UR VC: NORMAL
SERVICE CMNT-IMP: NORMAL

## 2018-10-03 ENCOUNTER — HOSPITAL ENCOUNTER (EMERGENCY)
Age: 58
Discharge: HOME | End: 2018-10-03
Attending: EMERGENCY MEDICINE
Payer: MEDICARE

## 2018-10-03 ENCOUNTER — HOSPITAL ENCOUNTER (EMERGENCY)
Age: 58
Discharge: HOME | End: 2018-10-04
Attending: EMERGENCY MEDICINE
Payer: MEDICARE

## 2018-10-03 VITALS
DIASTOLIC BLOOD PRESSURE: 79 MMHG | OXYGEN SATURATION: 99 % | HEIGHT: 64 IN | SYSTOLIC BLOOD PRESSURE: 146 MMHG | BODY MASS INDEX: 39.95 KG/M2 | RESPIRATION RATE: 18 BRPM | HEART RATE: 94 BPM | TEMPERATURE: 99.7 F | WEIGHT: 234 LBS

## 2018-10-03 VITALS
DIASTOLIC BLOOD PRESSURE: 72 MMHG | RESPIRATION RATE: 16 BRPM | TEMPERATURE: 98.2 F | SYSTOLIC BLOOD PRESSURE: 170 MMHG | OXYGEN SATURATION: 99 %

## 2018-10-03 DIAGNOSIS — F22 DELUSIONAL DISORDER (HCC): Primary | ICD-10-CM

## 2018-10-03 DIAGNOSIS — F22 DELUSIONS (HCC): Primary | ICD-10-CM

## 2018-10-03 LAB
ALBUMIN SERPL-MCNC: 3.8 G/DL (ref 3.5–5)
ALBUMIN/GLOB SERPL: 1.1 {RATIO} (ref 1.1–2.2)
ALP SERPL-CCNC: 91 U/L (ref 45–117)
ALT SERPL-CCNC: 32 U/L (ref 12–78)
ANION GAP SERPL CALC-SCNC: 8 MMOL/L (ref 5–15)
AST SERPL-CCNC: 34 U/L (ref 15–37)
BASOPHILS # BLD: 0.1 K/UL (ref 0–0.1)
BASOPHILS NFR BLD: 1 % (ref 0–1)
BILIRUB SERPL-MCNC: 0.4 MG/DL (ref 0.2–1)
BUN SERPL-MCNC: 15 MG/DL (ref 6–20)
BUN/CREAT SERPL: 21 (ref 12–20)
CALCIUM SERPL-MCNC: 8.5 MG/DL (ref 8.5–10.1)
CHLORIDE SERPL-SCNC: 106 MMOL/L (ref 97–108)
CO2 SERPL-SCNC: 26 MMOL/L (ref 21–32)
COMMENT, HOLDF: NORMAL
CREAT SERPL-MCNC: 0.73 MG/DL (ref 0.55–1.02)
DIFFERENTIAL METHOD BLD: ABNORMAL
EOSINOPHIL # BLD: 0.3 K/UL (ref 0–0.4)
EOSINOPHIL NFR BLD: 3 % (ref 0–7)
ERYTHROCYTE [DISTWIDTH] IN BLOOD BY AUTOMATED COUNT: 13.1 % (ref 11.5–14.5)
ETHANOL SERPL-MCNC: <10 MG/DL
GLOBULIN SER CALC-MCNC: 3.6 G/DL (ref 2–4)
GLUCOSE SERPL-MCNC: 108 MG/DL (ref 65–100)
HCT VFR BLD AUTO: 34.8 % (ref 35–47)
HGB BLD-MCNC: 11.7 G/DL (ref 11.5–16)
IMM GRANULOCYTES # BLD: 0 K/UL (ref 0–0.04)
IMM GRANULOCYTES NFR BLD AUTO: 0 % (ref 0–0.5)
LIPASE SERPL-CCNC: 98 U/L (ref 73–393)
LYMPHOCYTES # BLD: 3.3 K/UL (ref 0.8–3.5)
LYMPHOCYTES NFR BLD: 39 % (ref 12–49)
MCH RBC QN AUTO: 32.8 PG (ref 26–34)
MCHC RBC AUTO-ENTMCNC: 33.6 G/DL (ref 30–36.5)
MCV RBC AUTO: 97.5 FL (ref 80–99)
MONOCYTES # BLD: 0.6 K/UL (ref 0–1)
MONOCYTES NFR BLD: 7 % (ref 5–13)
NEUTS SEG # BLD: 4.2 K/UL (ref 1.8–8)
NEUTS SEG NFR BLD: 50 % (ref 32–75)
NRBC # BLD: 0 K/UL (ref 0–0.01)
NRBC BLD-RTO: 0 PER 100 WBC
PLATELET # BLD AUTO: 205 K/UL (ref 150–400)
PMV BLD AUTO: 10.6 FL (ref 8.9–12.9)
POTASSIUM SERPL-SCNC: 3.9 MMOL/L (ref 3.5–5.1)
PROT SERPL-MCNC: 7.4 G/DL (ref 6.4–8.2)
RBC # BLD AUTO: 3.57 M/UL (ref 3.8–5.2)
SAMPLES BEING HELD,HOLD: NORMAL
SODIUM SERPL-SCNC: 140 MMOL/L (ref 136–145)
WBC # BLD AUTO: 8.5 K/UL (ref 3.6–11)

## 2018-10-03 PROCEDURE — 80307 DRUG TEST PRSMV CHEM ANLYZR: CPT | Performed by: EMERGENCY MEDICINE

## 2018-10-03 PROCEDURE — 36415 COLL VENOUS BLD VENIPUNCTURE: CPT | Performed by: EMERGENCY MEDICINE

## 2018-10-03 PROCEDURE — 99283 EMERGENCY DEPT VISIT LOW MDM: CPT

## 2018-10-03 PROCEDURE — 90791 PSYCH DIAGNOSTIC EVALUATION: CPT

## 2018-10-03 PROCEDURE — 85025 COMPLETE CBC W/AUTO DIFF WBC: CPT | Performed by: EMERGENCY MEDICINE

## 2018-10-03 PROCEDURE — 99284 EMERGENCY DEPT VISIT MOD MDM: CPT

## 2018-10-03 PROCEDURE — 80053 COMPREHEN METABOLIC PANEL: CPT | Performed by: EMERGENCY MEDICINE

## 2018-10-03 PROCEDURE — 83690 ASSAY OF LIPASE: CPT | Performed by: EMERGENCY MEDICINE
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
## 2018-10-03 NOTE — DISCHARGE INSTRUCTIONS
Learning About Schizoaffective Disorder  What is schizoaffective disorder? Schizoaffective (say \"eslf-pt-bi-FECK-tiv\") disorder is a complex mental illness. People who have it have the symptoms of both schizophrenia and a mood disorder. The disorder affects how clearly you can think. It can also make it hard to manage your emotions and connect with others. And it affects how happy or sad you feel. What causes it? Experts don't know what causes schizoaffective disorder. It may have different causes for different people. It's not caused by anything you did or how your parents raised you. And it's not a sign of weakness. What are the symptoms? The symptoms of schizoaffective disorder are the same as those of schizophrenia and a mood disorder. People with schizoaffective disorder may have many of these symptoms. Schizophrenia symptoms include:  · Having hallucinations. This means that you see or hear things that aren't really there. · Having delusions. These are beliefs that aren't real.  · Having a hard time feeling and showing emotion. Mood disorder symptoms include:  · Depression. · Feeling extremely happy or having lots of energy. How is it diagnosed? Your doctor or mental health professional usually can tell if you have schizoaffective disorder by talking with you. He or she will look at the order and timing of your symptoms and how long your symptoms last.  Your doctor will ask you about other things too. These may include questions about:  · Any odd experiences you may have had, such as hearing voices or having confusing thoughts. · Your feelings. · Any changes in eating habits, energy level, and interest in daily tasks. · How well you are sleeping. · If you can focus on the things you do. How is it treated? Finding out that you have schizoaffective disorder can be scary and hard to deal with. But the disorder can be treated.   The goal of treatment is to lower your stress and help your brain work as it should. Ongoing treatment can keep the disorder under control. Treatment includes medicines and counseling. Medicines help your symptoms. It's important to take your medicines on schedule to keep your moods even. When you feel good, you may think that you don't need them. But it is important to keep taking them. Counseling helps you change how you think about things. It can also help you cope with the illness. You will work with a mental health professional. This may be a psychologist, a licensed professional counselor, a clinical , or a psychiatrist.  Follow-up care is a key part of your treatment and safety. Be sure to make and go to all appointments, and call your doctor if you are having problems. It's also a good idea to know your test results and keep a list of the medicines you take. Where can you learn more? Go to http://sterling-ishaan.info/. Enter A016 in the search box to learn more about \"Learning About Schizoaffective Disorder. \"  Current as of: December 7, 2017  Content Version: 11.8  © 9755-4825 [employer], [employer]. Care instructions adapted under license by [employer] (which disclaims liability or warranty for this information). If you have questions about a medical condition or this instruction, always ask your healthcare professional. [address] any warranty or liability for your use of this information.

## 2018-10-03 NOTE — ED TRIAGE NOTES
Patient comes to the ER via EMS claiming she is 8 months pregnant and that she had a :labor episode\" today. Extensive psych history. Patient has no complaints at this time.

## 2018-10-03 NOTE — BSMART NOTE
Axis 1- Schizoaffective D/O   Axis 11- Deferred  Axis 111- Diabetes    Insurance:  Medicare and Medicaid    Patient is a 66year old female w  called an ambulance to take her to the ED reporting that she is 8 months pregnant and needing to have the baby because her Esthela Pole broke a few days ago.   Patient is a client of [address] PACT Team. ([phone])BTQZ therapist contacted Terrell Ribeiro at [facility] who advised that Jose Avitia, a PACT team worker was with patient this morning and took her to her PCP and then home. She wanted to go to the ER afterwards but he refused to take her. She said she would call an ambulance which she did and subsequently she was transported to [address] [place] [address]. Upon evaluation patient was alert and oriented. She continued with her usual fixed delusion insisting that she was pregnant and needed to deliver the baby. Ruma Rossi at [facility] team advised that patient saw psychiatrist, Dr. Lakhwinder Ruiz yesterday. She is on a Monday, Wednesday and Friday Pact team schedule which means that a Pact team member visits her at her home on those days. Ruma Rossi advised to send patient home and she will visit her this evening before 7 PM. She states that It may be that patient is decompensating and will ultimately need inpatient treatment but the PACT team will work with patient and Dr. Lakhwinder Ruiz in an effort to keep her out of the hospital. However, if she continues to decompensate despite their interventions, a TDO may be necessary in the near future. Plan: Discharge when medically cleared. Transportation home has been arranged through her insurance. Patient to be followed up by Pact team this evening.     [facility] [place]

## 2018-10-04 PROCEDURE — 75810000275 HC EMERGENCY DEPT VISIT NO LEVEL OF CARE

## 2018-10-04 NOTE — ED TRIAGE NOTES
Patient arrives to the ED with c/o of being 8 months pregnant, patient presents with a very convoluted story, no SI/HI, patient states she has not had a \"cycle\" in 4 yrs. Patient seen and discharged at this ER this afternoon.

## 2018-10-04 NOTE — BSMART NOTE
Patient is 66year old female known to this . Patient denied suicidal thoughts, homicidal thoughts and hallucinations. Patient reported she went to her PCP today to be seen did not report the disposition but stated last time she was at [facility] they told her to come back in 2-3 days if nothing had changed. Patient reports that she hasnot had her cycle in four years and she is 8 months pregnant. The patient is functioning at her baseline. When asked patient why she is here and what she needs assistance with she begins to discuss how her ride was to her appointment and stated they the  was acting funny. Patient also discussed her two sons and reported on was at her home and she thought it was funny that he put grease in the pan to cook an mervat burger. The patient reported seeing the PACT team on Monday and she saw her psychiatrist. Patient reported being compliant with medications and maintaining her daily living.  spoke with Chiqui Schumacher from PACT team who stated that they will see patient on Thursday at her home. James reported that patient's report of being pregnant has been her baseline functioning. As reported by Chiqui Schumacher she was seen by the psychiatrist on Monday who felt that patient did not meet criteria or require a TDO. Patient will be discharged to her home.

## 2018-10-04 NOTE — ED NOTES
Discharge instructions given to pt by MD and RN . Pt has been given counseling on med use and verbalizes  understanding . Pt wheeled  off unit with no signs of distress.

## 2018-10-04 NOTE — ED NOTES
6060 Patient assisted with placing underwear and pants on. Another RN was called in to assist patient in w/c. X 2 RN escorted patient into lobby.

## 2018-10-04 NOTE — PROGRESS NOTES
8:45 AM  CM received a call to assist with patient. CM met with patient in the waiting room. Patient requiring assistance with transport home. CM contacted [address] [place] [address] Team [phone] for assistance. CM spoke with James. CM provided updates. PACT Worker to come to hospital to  patient and transport home. RN notified. Patient notified. CM to be contacted directly from PACT Team worker when en-route for transport.     Troy Escobar, MSW/KENA

## 2018-10-04 NOTE — ED NOTES
Bedside and Verbal shift change report given to Justus Ingram (oncoming nurse) by Mariana Cabral (offgoing nurse). Report included the following information SBAR, Kardex and ED Summary.

## 2018-10-04 NOTE — ED PROVIDER NOTES
HPI Comments: Luann Olson is a 66 y.o. female  who presents by private vehicle to ER with c/o Patient presents with:  Mental Health Problem. Patient presents with complaints of being 8 months pregnant, patient with extensive psych history. Seen in ED this morning for same and discharged home. Patient denies suicidal or homicidal ideation. [facility]. She specifically denies any fevers, chills, nausea, vomiting, chest pain, shortness of breath, headache, rash, diarrhea, abdominal pain, urinary/bowel changes, sweating or weight loss. PCP: Grace Rey MD   PMHx significant for: Past Medical History:  No date: Diabetes [facility])  No date: High cholesterol  No date: Other ill-defined conditions(799.89)      Comment: hypercholesteremia  No date: Psychiatric disorder      Comment: SCHIZOAFFECTIVE DISORDER, anxiety, bipolar  No date: Schizoaffective disorder ([facility] [address]   PSHx significant for: Past Surgical History:  03/03/2017: HX CHOLECYSTECTOMY      Comment: [facility]o andujaremxbc-ZSW-ImDr. ALEXANDREA Yuan  1991: HX TUBAL LIGATION  Social Hx: Tobacco use: Smoking status: Never Smoker                                                              Smokeless status: Never Used                      ; EtOH use: The patient states she drinks 0 per week.; Illicit Drug use: Allergies:   -- Penicillins -- Rash    There are no other complaints, changes or physical findings at this time. Patient is a 66 y.o. female presenting with mental health disorder. The history is provided by the patient. Mental Health Problem    This is a recurrent problem. The current episode started more than 1 week ago. The problem has not changed since onset. Associated symptoms include confusion and delusions. Pertinent negatives include no somnolence, no seizures, no unresponsiveness, no weakness, no agitation, no hallucinations, no self-injury, no violence, no tingling and no numbness.  Her past medical history is significant for diabetes and psychotropic medication treatment. Past Medical History:   Diagnosis Date    Diabetes ([facility] [address]     High cholesterol     Other ill-defined conditions(799.89)     hypercholesteremia    Psychiatric disorder     SCHIZOAFFECTIVE DISORDER, anxiety, bipolar    Schizoaffective disorder ([facility] [address]        Past Surgical History:   Procedure Laterality Date    HX CHOLECYSTECTOMY  03/03/2017    beto colmenaresqhbgl-OLZ-[facility]    HX TUBAL LIGATION  1991         History reviewed. No pertinent family history. Social History     Social History    Marital status:      Spouse name: N/A    Number of children: N/A    Years of education: N/A     Occupational History    Not on file. Social History Main Topics    Smoking status: Never Smoker    Smokeless tobacco: Never Used    Alcohol use No    Drug use: No    Sexual activity: Not on file     Other Topics Concern    Not on file     Social History Narrative         ALLERGIES: Penicillins    Review of Systems   Constitutional: Negative. HENT: Negative. Eyes: Negative. Respiratory: Negative. Cardiovascular: Negative. Gastrointestinal: Negative. Endocrine: Negative. Genitourinary: Negative. Musculoskeletal: Negative. Skin: Negative. Allergic/Immunologic: Negative. Neurological: Negative. Negative for tingling, seizures, weakness and numbness. Hematological: Negative. Psychiatric/Behavioral: Positive for confusion. Negative for agitation, hallucinations, self-injury and suicidal ideas. All other systems reviewed and are negative. Vitals:    10/03/18 2037   BP: 170/72   Resp: 16   Temp: 98.2 °F (36.8 °C)   SpO2: 99%            Physical Exam   Constitutional: She appears well-developed. Non-toxic appearance. She does not have a sickly appearance. She does not appear ill. No distress. Patient is disheveled appearing   HENT:   Head: Normocephalic and atraumatic.    Right Ear: External ear normal.   Left Ear: External ear normal.   Nose: Nose normal.   Mouth/Throat: Oropharynx is clear and moist. No oropharyngeal exudate. Eyes: Conjunctivae, EOM and lids are normal. Right eye exhibits no discharge. Left eye exhibits no discharge. Neck: Normal range of motion. No tracheal deviation present. No thyromegaly present. Cardiovascular: Normal rate, regular rhythm, normal heart sounds and intact distal pulses. Pulmonary/Chest: Effort normal and breath sounds normal.   Abdominal: Soft. Normal appearance and bowel sounds are normal.   Musculoskeletal: Normal range of motion. Neurological: She is alert. She has normal strength. She is disoriented. No cranial nerve deficit or sensory deficit. She displays a negative Romberg sign. GCS eye subscore is 4. GCS verbal subscore is 5. GCS motor subscore is 6. Skin: Skin is warm and dry. Psychiatric: She has a normal mood and affect. Her speech is normal. Thought content is paranoid and delusional. Cognition and memory are impaired. She expresses inappropriate judgment. She expresses no homicidal and no suicidal ideation. She expresses no suicidal plans and no homicidal plans. She exhibits abnormal recent memory. MDM  Number of Diagnoses or Management Options  Delusions [facility]):   Diagnosis management comments: Assesment/Plan- 66 y.o. Patient presents with:  Mental Health Problem  differential includes: delusional. Patient was seen by [facility], patient with extensive psych history. Discussed with patient's psychiatrist, who states this is patients baseline. Patient seen in ED earlier today, no change. Recommend Psychiatry follow up. Patient educated on reasons to return to the ED.           ED Course       Procedures

## 2018-11-28 ENCOUNTER — HOSPITAL ENCOUNTER (EMERGENCY)
Age: 58
Discharge: HOME | End: 2018-11-28
Attending: EMERGENCY MEDICINE
Payer: MEDICARE

## 2018-11-28 ENCOUNTER — APPOINTMENT (OUTPATIENT)
Dept: ULTRASOUND IMAGING | Age: 58
End: 2018-11-28
Attending: PHYSICIAN ASSISTANT
Payer: MEDICARE

## 2018-11-28 VITALS
HEIGHT: 64 IN | RESPIRATION RATE: 16 BRPM | TEMPERATURE: 98.1 F | SYSTOLIC BLOOD PRESSURE: 120 MMHG | HEART RATE: 67 BPM | OXYGEN SATURATION: 99 % | WEIGHT: 234 LBS | BODY MASS INDEX: 39.95 KG/M2 | DIASTOLIC BLOOD PRESSURE: 80 MMHG

## 2018-11-28 DIAGNOSIS — R10.9 ABDOMINAL PAIN, UNSPECIFIED ABDOMINAL LOCATION: Primary | ICD-10-CM

## 2018-11-28 LAB
APPEARANCE UR: CLEAR
BACTERIA URNS QL MICRO: NEGATIVE /HPF
BILIRUB UR QL: NEGATIVE
COLOR UR: ABNORMAL
EPITH CASTS URNS QL MICRO: ABNORMAL /LPF
GLUCOSE UR STRIP.AUTO-MCNC: NEGATIVE MG/DL
HGB UR QL STRIP: NEGATIVE
KETONES UR QL STRIP.AUTO: ABNORMAL MG/DL
LEUKOCYTE ESTERASE UR QL STRIP.AUTO: ABNORMAL
NITRITE UR QL STRIP.AUTO: NEGATIVE
PH UR STRIP: 6 [PH] (ref 5–8)
PROT UR STRIP-MCNC: NEGATIVE MG/DL
RBC #/AREA URNS HPF: ABNORMAL /HPF (ref 0–5)
SP GR UR REFRACTOMETRY: 1.02 (ref 1–1.03)
UR CULT HOLD, URHOLD: NORMAL
UROBILINOGEN UR QL STRIP.AUTO: 0.2 EU/DL (ref 0.2–1)
WBC URNS QL MICRO: ABNORMAL /HPF (ref 0–4)

## 2018-11-28 PROCEDURE — 99283 EMERGENCY DEPT VISIT LOW MDM: CPT

## 2018-11-28 PROCEDURE — 81001 URINALYSIS AUTO W/SCOPE: CPT

## 2018-11-28 PROCEDURE — 76830 TRANSVAGINAL US NON-OB: CPT

## 2018-11-28 PROCEDURE — 76856 US EXAM PELVIC COMPLETE: CPT

## 2018-11-28 RX ORDER — IBUPROFEN 400 MG/1
800 TABLET ORAL
Status: DISCONTINUED | OUTPATIENT
Start: 2018-11-28 | End: 2018-11-28 | Stop reason: HOSPADM

## 2018-11-28 RX ORDER — DICYCLOMINE HYDROCHLORIDE 10 MG/1
20 CAPSULE ORAL
Status: DISCONTINUED | OUTPATIENT
Start: 2018-11-28 | End: 2018-11-28 | Stop reason: HOSPADM

## 2018-11-28 NOTE — ED NOTES
Patient given discharge instructions. No questions or concerns at this time. Patient's VSS and in no acute distress. Patient ambulatory to waiting room.

## 2018-11-28 NOTE — ED PROVIDER NOTES
66year old female extensive psych hx presenting to the ED for abdominal pain. Pt states \"I'm in labor, I haven't had a period in 4 years. \"  + vaginal bleeding that started last month, light, occurs every few days. Reports some abdominal cramping. Denies fever or vomiting. Frequent bowel movements. No urinary symptoms. Supposed to see PCP tomorrow. PMHx: DM, high cholesterol, schizoaffective             Past Medical History:   Diagnosis Date    [facility] ([facility] [address]     High cholesterol     Other ill-defined conditions(799.89)     hypercholesteremia    Psychiatric disorder     SCHIZOAFFECTIVE DISORDER, anxiety, bipolar    Schizoaffective disorder ([facility] [address]        Past Surgical History:   Procedure Laterality Date    HX CHOLECYSTECTOMY  03/03/2017    beto andujarseyhy-KID-UoDr. Griffin Duenas    HX TUBAL LIGATION  1991         History reviewed. No pertinent family history. Social History     Socioeconomic History    Marital status:      Spouse name: Not on file    Number of children: Not on file    Years of education: Not on file    Highest education level: Not on file   Social Needs    Financial resource strain: Not on file    Food insecurity - worry: Not on file    Food insecurity - inability: Not on file    Transportation needs - medical: Not on file   [employer] needs - non-medical: Not on file   Occupational History    Not on file   Tobacco Use    Smoking status: Never Smoker    Smokeless tobacco: Never Used   Substance and Sexual Activity    Alcohol use: No    Drug use: No    Sexual activity: Not on file   Other Topics Concern    Not on file   Social History Narrative    Not on file         ALLERGIES: Penicillins    Review of Systems   Constitutional: Negative for fever. HENT: Negative for congestion. Eyes: Negative for discharge. Respiratory: Negative for cough. Cardiovascular: Negative for chest pain. Gastrointestinal: Positive for abdominal pain. Negative for vomiting. Genitourinary: Positive for vaginal bleeding. Negative for difficulty urinating. Skin: Negative for wound. Neurological: Negative for syncope. Psychiatric/Behavioral: Negative for behavioral problems. All other systems reviewed and are negative. Vitals:    11/28/18 1035 11/28/18 1201 11/28/18 1357   BP:  114/79 120/80   Pulse: 84 67 67   Resp:  16 16   Temp:  98.2 °F (36.8 °C) 98.1 °F (36.7 °C)   SpO2: 97% 98% 99%   Weight:  106.1 kg (234 lb)    Height:  5' 4\" (1.626 m)             Physical Exam   Constitutional: She appears well-developed and well-nourished. No distress. Alert, well-appearing AA female   HENT:   Right Ear: External ear normal.   Left Ear: External ear normal.   Eyes: Pupils are equal, round, and reactive to light. Neck: Normal range of motion. Neck supple. Cardiovascular: Normal rate, regular rhythm and normal heart sounds. Exam reveals no gallop and no friction rub. No murmur heard. Pulmonary/Chest: Effort normal and breath sounds normal. No respiratory distress. She has no wheezes. Abdominal: Soft. There is no tenderness. There is no guarding. Obese, soft. Non-tender to deep palpation in all quadrants. No CVAT. Musculoskeletal: Normal range of motion. Neurological: She is alert. Skin: Skin is warm and dry. Psychiatric: She has a normal mood and affect. Nursing note and vitals reviewed. MDM  Number of Diagnoses or Management Options  Abdominal pain, unspecified abdominal location:   Diagnosis management comments: 66year old female presenting to the ED for one month of abdominal cramps, rare vaginal spotting. Psych hx, concerned she may be pregnant. Post-menopausal.  Normal UA and US. Encouraged continued f/u with PCP tomorrow.        Amount and/or Complexity of Data Reviewed  Clinical lab tests: ordered and reviewed  Tests in the radiology section of CPT®: ordered and reviewed  Discuss the patient with other providers: yes (Dr. Brian Ceron, ED attending)           Procedures

## 2018-11-28 NOTE — ED TRIAGE NOTES
\"Im in labor and I have vaginal bleeding. Like last month. \" Patient reports she has not had a period in years and is pregnant. Patient has a hx of delusions.

## 2018-11-28 NOTE — DISCHARGE INSTRUCTIONS
- return for new or worsening symptoms  - keep your primary care follow up appt     Abdominal Pain: Care Instructions  Your Care Instructions    Abdominal pain has many possible causes. Some aren't serious and get better on their own in a few days. Others need more testing and treatment. If your pain continues or gets worse, you need to be rechecked and may need more tests to find out what is wrong. You may need surgery to correct the problem. Don't ignore new symptoms, such as fever, nausea and vomiting, urination problems, pain that gets worse, and dizziness. These may be signs of a more serious problem. Your doctor may have recommended a follow-up visit in the next 8 to 12 hours. If you are not getting better, you may need more tests or treatment. The doctor has checked you carefully, but problems can develop later. If you notice any problems or new symptoms, get medical treatment right away. Follow-up care is a key part of your treatment and safety. Be sure to make and go to all appointments, and call your doctor if you are having problems. It's also a good idea to know your test results and keep a list of the medicines you take. How can you care for yourself at home? · Rest until you feel better. · To prevent dehydration, drink plenty of fluids, enough so that your urine is light yellow or clear like water. Choose water and other caffeine-free clear liquids until you feel better. If you have kidney, heart, or liver disease and have to limit fluids, talk with your doctor before you increase the amount of fluids you drink. · If your stomach is upset, eat mild foods, such as rice, dry toast or crackers, bananas, and applesauce. Try eating several small meals instead of two or three large ones. · Wait until 48 hours after all symptoms have gone away before you have spicy foods, alcohol, and drinks that contain caffeine. · Do not eat foods that are high in fat.   · Avoid anti-inflammatory medicines such as aspirin, ibuprofen (Advil, Motrin), and naproxen (Aleve). These can cause stomach upset. Talk to your doctor if you take daily aspirin for another health problem. When should you call for help? Call 911 anytime you think you may need emergency care. For example, call if:    · You passed out (lost consciousness).     · You pass maroon or very bloody stools.     · You vomit blood or what looks like coffee grounds.     · You have new, severe belly pain.    Call your doctor now or seek immediate medical care if:    · Your pain gets worse, especially if it becomes focused in one area of your belly.     · You have a new or higher fever.     · Your stools are black and look like tar, or they have streaks of blood.     · You have unexpected vaginal bleeding.     · You have symptoms of a urinary tract infection. These may include:  ? Pain when you urinate. ? Urinating more often than usual.  ? Blood in your urine.     · You are dizzy or lightheaded, or you feel like you may faint.    Watch closely for changes in your health, and be sure to contact your doctor if:    · You are not getting better after 1 day (24 hours). Where can you learn more? Go to http://sterling-ishaan.info/. Enter R275 in the search box to learn more about \"Abdominal Pain: Care Instructions. \"  Current as of: November 20, 2017  Content Version: 11.8  © 4779-6266 [employer]. Care instructions adapted under license by [employer] (which disclaims liability or warranty for this information). If you have questions about a medical condition or this instruction, always ask your healthcare professional. [address] any warranty or liability for your use of this information.

## 2018-11-28 NOTE — PROGRESS NOTES
Care Management     Received consult for CM, discharge transport requested. Call made to [facility] team [phone], spoke with Marissa Cantu. They are unable to transport home today they are out seeing clients. They ask that Medicaid Transportation be set up and they will check on patient tonight, either Marissa Cantu or Abby Causey will see her at apartment.     Elan Valdez, RN, BSN, [facility]  ED Care Management  [phone]

## 2019-02-20 ENCOUNTER — HOSPITAL ENCOUNTER (EMERGENCY)
Age: 59
Discharge: HOME | End: 2019-02-20
Attending: STUDENT IN AN ORGANIZED HEALTH CARE EDUCATION/TRAINING PROGRAM
Payer: MEDICARE

## 2019-02-20 ENCOUNTER — APPOINTMENT (OUTPATIENT)
Dept: GENERAL RADIOLOGY | Age: 59
End: 2019-02-20
Attending: EMERGENCY MEDICINE
Payer: MEDICARE

## 2019-02-20 VITALS
SYSTOLIC BLOOD PRESSURE: 120 MMHG | RESPIRATION RATE: 8 BRPM | DIASTOLIC BLOOD PRESSURE: 90 MMHG | TEMPERATURE: 98.6 F | OXYGEN SATURATION: 100 % | HEART RATE: 74 BPM

## 2019-02-20 DIAGNOSIS — R10.9 CHRONIC ABDOMINAL PAIN: Primary | ICD-10-CM

## 2019-02-20 DIAGNOSIS — G89.29 CHRONIC ABDOMINAL PAIN: Primary | ICD-10-CM

## 2019-02-20 LAB
APPEARANCE UR: CLEAR
BACTERIA URNS QL MICRO: NEGATIVE /HPF
BILIRUB UR QL: NEGATIVE
COLOR UR: ABNORMAL
EPITH CASTS URNS QL MICRO: ABNORMAL /LPF
GLUCOSE UR STRIP.AUTO-MCNC: NEGATIVE MG/DL
HGB UR QL STRIP: NEGATIVE
KETONES UR QL STRIP.AUTO: NEGATIVE MG/DL
LEUKOCYTE ESTERASE UR QL STRIP.AUTO: ABNORMAL
NITRITE UR QL STRIP.AUTO: NEGATIVE
PH UR STRIP: 8 [PH] (ref 5–8)
PROT UR STRIP-MCNC: NEGATIVE MG/DL
RBC #/AREA URNS HPF: ABNORMAL /HPF (ref 0–5)
SP GR UR REFRACTOMETRY: 1.02 (ref 1–1.03)
UROBILINOGEN UR QL STRIP.AUTO: 1 EU/DL (ref 0.2–1)
WBC URNS QL MICRO: ABNORMAL /HPF (ref 0–4)

## 2019-02-20 PROCEDURE — 81001 URINALYSIS AUTO W/SCOPE: CPT

## 2019-02-20 PROCEDURE — 99284 EMERGENCY DEPT VISIT MOD MDM: CPT

## 2019-02-20 NOTE — ED PROVIDER NOTES
HPI   Pt is a 66 yr female with an extensive psych history presenting to the ED for mid right abdominal pain and no menstrual cycle for 4years. Denies fever, cough, cold symptoms, headache, neck pain, visual changes, focal weakness or rash. Denies any difficulty breathing, difficulty swallowing, SOB or chest pain. Denies any nausea, vomiting or diarrhea. Pt. Reports that she had licorice candy and oatmeal prior to arrival.  PMH, social history and ROS are limited secondary to schizoaffective disorder  Past Medical History:   Diagnosis Date    Diabetes ([facility] [address]     High cholesterol     Other ill-defined conditions(799.89)     hypercholesteremia    Psychiatric disorder     SCHIZOAFFECTIVE DISORDER, anxiety, bipolar    Schizoaffective disorder ([facility] [address]        Past Surgical History:   Procedure Laterality Date    HX CHOLECYSTECTOMY  03/03/2017    [id number]    HX TUBAL LIGATION  1991         History reviewed. No pertinent family history. Social History     Socioeconomic History    Marital status:      Spouse name: Not on file    Number of children: Not on file    Years of education: Not on file    Highest education level: Not on file   Social Needs    Financial resource strain: Not on file    Food insecurity - worry: Not on file    Food insecurity - inability: Not on file    Transportation needs - medical: Not on file   [place] needs - non-medical: Not on file   Occupational History    Not on file   Tobacco Use    Smoking status: Never Smoker    Smokeless tobacco: Never Used   Substance and Sexual Activity    Alcohol use: No    Drug use: No    Sexual activity: Not on file   Other Topics Concern    Not on file   Social History Narrative    Not on file         ALLERGIES: Penicillins    Review of Systems   Constitutional: Negative for activity change, appetite change and fever. HENT: Negative for congestion, facial swelling and trouble swallowing.     Respiratory: Negative for cough and shortness of breath. Cardiovascular: Negative for chest pain, palpitations and leg swelling. Gastrointestinal: Positive for abdominal pain. Negative for diarrhea, nausea and vomiting. Genitourinary: Negative for dysuria and hematuria. Musculoskeletal: Negative for arthralgias and myalgias. Skin: Negative for rash. Neurological: Negative for headaches. All other systems reviewed and are negative. Vitals:    02/20/19 1711 02/20/19 1717   BP:  120/90   Pulse: 68 74   Resp:  8   Temp:  98.6 °F (37 °C)   SpO2: 100% 100%            Physical Exam   Constitutional: She is oriented to person, place, and time. Obese black female; non smoker   HENT:   Head: Normocephalic. Mouth/Throat: Oropharynx is clear and moist.   Cardiovascular: Normal rate and regular rhythm. Pulmonary/Chest: Effort normal and breath sounds normal.   Abdominal: Soft. Normal appearance and bowel sounds are normal. There is tenderness in the right upper quadrant. Neurological: She is alert and oriented to person, place, and time. Skin: Skin is warm and dry. No rash noted. Psychiatric: She has a normal mood and affect. Nursing note and vitals reviewed. MDM       Procedures    Pt refuses any any xrays or further testing. She was given referrals to gastroenterology for follow up and for OB/GYN for her concerns over no menstrual cycle for 4 yrs. Patient's results and plan of care have been reviewed with her. Patient has verbally conveyed her understanding and agreement of her signs, symptoms, diagnosis, treatment and prognosis and additionally agrees to follow up as recommended or return to the Emergency Room should her condition change prior to follow-up.   Discharge instructions have also been provided to the patient with some educational information regarding her diagnosis as well a list of reasons why she would want to return to the ER prior to her follow-up appointment should her condition change. Arrangements were made for her transportation home by care management.  Shaq Salinas NP

## 2019-02-20 NOTE — PROGRESS NOTES
Care Management set up [employer]/Ly[employer] transportation home.     Juliana Holder RN, BSN, [facility]  ED Care Management  [phone]

## 2019-02-20 NOTE — ED TRIAGE NOTES
Patient comes to the ER via EMS c/o R flank pain.  Recently treated for UTI but unsure if finished ABX

## 2019-02-20 NOTE — ED NOTES
NP reviewed discharge instructions with patient. Patient verbalized understanding. Time allotted for questions. A&O at time of discharge. VSS.  Patient ambulatory off unit to waiting room for transportation

## 2019-02-20 NOTE — DISCHARGE INSTRUCTIONS

## 2019-02-20 NOTE — ED TRIAGE NOTES
Pt arrives via squad , pt recently dx with uti and is still having frequent urination, pt has not had a period in 4 years and thinks she may be pregnant

## 2019-07-30 ENCOUNTER — HOSPITAL ENCOUNTER (OUTPATIENT)
Dept: ULTRASOUND IMAGING | Age: 59
Discharge: HOME | End: 2019-07-30
Attending: NURSE PRACTITIONER
Payer: MEDICARE

## 2019-07-30 DIAGNOSIS — R10.9 ABDOMINAL PAIN: ICD-10-CM

## 2019-07-30 PROCEDURE — 76700 US EXAM ABDOM COMPLETE: CPT

## 2019-07-30 PROCEDURE — 76856 US EXAM PELVIC COMPLETE: CPT

## 2019-07-30 PROCEDURE — 76830 TRANSVAGINAL US NON-OB: CPT

## 2020-06-23 NOTE — DISCHARGE INSTRUCTIONS
Anxiety Disorder: Care Instructions  Your Care Instructions    Anxiety is a normal reaction to stress. Difficult situations can cause you to have symptoms such as sweaty palms and a nervous feeling. In an anxiety disorder, the symptoms are far more severe. Constant worry, muscle tension, trouble sleeping, nausea and diarrhea, and other symptoms can make normal daily activities difficult or impossible. These symptoms may occur for no reason, and they can affect your work, school, or social life. Medicines, counseling, and self-care can all help. Follow-up care is a key part of your treatment and safety. Be sure to make and go to all appointments, and call your doctor if you are having problems. It's also a good idea to know your test results and keep a list of the medicines you take. How can you care for yourself at home? · Take medicines exactly as directed. Call your doctor if you think you are having a problem with your medicine. · Go to your counseling sessions and follow-up appointments. · Recognize and accept your anxiety. Then, when you are in a situation that makes you anxious, say to yourself, \"This is not an emergency. I feel uncomfortable, but I am not in danger. I can keep going even if I feel anxious. \"  · Be kind to your body:  ¨ Relieve tension with exercise or a massage. ¨ Get enough rest.  ¨ Avoid alcohol, caffeine, nicotine, and illegal drugs. They can increase your anxiety level and cause sleep problems. ¨ Learn and do relaxation techniques. See below for more about these techniques. · Engage your mind. Get out and do something you enjoy. Go to a funny movie, or take a walk or hike. Plan your day. Having too much or too little to do can make you anxious. · Keep a record of your symptoms. Discuss your fears with a good friend or family member, or join a support group for people with similar problems. Talking to others sometimes relieves stress.   · Get involved in social groups, or volunteer to help others. Being alone sometimes makes things seem worse than they are. · Get at least 30 minutes of exercise on most days of the week to relieve stress. Walking is a good choice. You also may want to do other activities, such as running, swimming, cycling, or playing tennis or team sports. Relaxation techniques  Do relaxation exercises 10 to 20 minutes a day. You can play soothing, relaxing music while you do them, if you wish. · Tell others in your house that you are going to do your relaxation exercises. Ask them not to disturb you. · Find a comfortable place, away from all distractions and noise. · Lie down on your back, or sit with your back straight. · Focus on your breathing. Make it slow and steady. · Breathe in through your nose. Breathe out through either your nose or mouth. · Breathe deeply, filling up the area between your navel and your rib cage. Breathe so that your belly goes up and down. · Do not hold your breath. · Breathe like this for 5 to 10 minutes. Notice the feeling of calmness throughout your whole body. As you continue to breathe slowly and deeply, relax by doing the following for another 5 to 10 minutes:  · Tighten and relax each muscle group in your body. You can begin at your toes and work your way up to your head. · Imagine your muscle groups relaxing and becoming heavy. · Empty your mind of all thoughts. · Let yourself relax more and more deeply. · Become aware of the state of calmness that surrounds you. · When your relaxation time is over, you can bring yourself back to alertness by moving your fingers and toes and then your hands and feet and then stretching and moving your entire body. Sometimes people fall asleep during relaxation, but they usually wake up shortly afterward. · Always give yourself time to return to full alertness before you drive a car or do anything that might cause an accident if you are not fully alert.  Never play a relaxation tape while you drive a car. When should you call for help? Call 911 anytime you think you may need emergency care. For example, call if:  ? · You feel you cannot stop from hurting yourself or someone else. ? Keep the numbers for these national suicide hotlines: 9-902-779-TALK (7-845.851.6121) and 0-702-LCCUHSK (7-365.522.5479). If you or someone you know talks about suicide or feeling hopeless, get help right away. ? Watch closely for changes in your health, and be sure to contact your doctor if:  ? · You have anxiety or fear that affects your life. ? · You have symptoms of anxiety that are new or different from those you had before. Where can you learn more? Go to http://sterling-ishaan.info/. Enter P754 in the search box to learn more about \"Anxiety Disorder: Care Instructions. \"  Current as of: May 12, 2017  Content Version: 11.4  © 8135-1407 [employer]. Care instructions adapted under license by [employer] (which disclaims liability or warranty for this information). If you have questions about a medical condition or this instruction, always ask your healthcare professional. [address] any warranty or liability for your use of this information. Detail Level: Simple Instructions: This plan will send the code FBSE to the PM system.  DO NOT or CHANGE the price. Price (Do Not Change): 0.00

## 2022-01-13 ENCOUNTER — HOSPITAL ENCOUNTER (EMERGENCY)
Age: 62
Discharge: OTHER INSTITUTION | End: 2022-01-13
Attending: EMERGENCY MEDICINE
Payer: MEDICARE

## 2022-01-13 ENCOUNTER — HOSPITAL ENCOUNTER (INPATIENT)
Age: 62
LOS: 9 days | Discharge: HOME HEALTH | DRG: 885 | End: 2022-01-22
Attending: PSYCHIATRY & NEUROLOGY | Admitting: PSYCHIATRY & NEUROLOGY
Payer: MEDICARE

## 2022-01-13 VITALS
BODY MASS INDEX: 39.95 KG/M2 | RESPIRATION RATE: 18 BRPM | DIASTOLIC BLOOD PRESSURE: 60 MMHG | WEIGHT: 234 LBS | OXYGEN SATURATION: 98 % | TEMPERATURE: 98.3 F | SYSTOLIC BLOOD PRESSURE: 98 MMHG | HEART RATE: 82 BPM | HEIGHT: 64 IN

## 2022-01-13 DIAGNOSIS — F25.9 SCHIZOAFFECTIVE DISORDER, UNSPECIFIED TYPE (HCC): Primary | ICD-10-CM

## 2022-01-13 DIAGNOSIS — U07.1 COVID-19: ICD-10-CM

## 2022-01-13 DIAGNOSIS — Z91.14 NONCOMPLIANCE WITH MEDICATIONS: ICD-10-CM

## 2022-01-13 PROBLEM — F29 PSYCHOSIS (HCC): Status: ACTIVE | Noted: 2022-01-13

## 2022-01-13 LAB
ALBUMIN SERPL-MCNC: 3.6 G/DL (ref 3.5–5)
ALBUMIN/GLOB SERPL: 1.1 {RATIO} (ref 1.1–2.2)
ALP SERPL-CCNC: 89 U/L (ref 45–117)
ALT SERPL-CCNC: 36 U/L (ref 12–78)
AMPHET UR QL SCN: NEGATIVE
ANION GAP SERPL CALC-SCNC: 10 MMOL/L (ref 5–15)
APPEARANCE UR: ABNORMAL
AST SERPL-CCNC: 24 U/L (ref 15–37)
BACTERIA URNS QL MICRO: NEGATIVE /HPF
BARBITURATES UR QL SCN: NEGATIVE
BASOPHILS # BLD: 0 K/UL (ref 0–0.1)
BASOPHILS NFR BLD: 1 % (ref 0–1)
BENZODIAZ UR QL: NEGATIVE
BILIRUB SERPL-MCNC: 0.4 MG/DL (ref 0.2–1)
BILIRUB UR QL: NEGATIVE
BUN SERPL-MCNC: 12 MG/DL (ref 6–20)
BUN/CREAT SERPL: 15 (ref 12–20)
CALCIUM SERPL-MCNC: 9 MG/DL (ref 8.5–10.1)
CANNABINOIDS UR QL SCN: NEGATIVE
CHLORIDE SERPL-SCNC: 106 MMOL/L (ref 97–108)
CO2 SERPL-SCNC: 29 MMOL/L (ref 21–32)
COCAINE UR QL SCN: NEGATIVE
COLOR UR: ABNORMAL
CREAT SERPL-MCNC: 0.8 MG/DL (ref 0.55–1.02)
DIFFERENTIAL METHOD BLD: ABNORMAL
DRUG SCRN COMMENT,DRGCM: NORMAL
EOSINOPHIL # BLD: 0.1 K/UL (ref 0–0.4)
EOSINOPHIL NFR BLD: 1 % (ref 0–7)
EPITH CASTS URNS QL MICRO: ABNORMAL /LPF
ERYTHROCYTE [DISTWIDTH] IN BLOOD BY AUTOMATED COUNT: 13.1 % (ref 11.5–14.5)
ETHANOL SERPL-MCNC: <10 MG/DL
FLUAV RNA SPEC QL NAA+PROBE: NOT DETECTED
FLUBV RNA SPEC QL NAA+PROBE: NOT DETECTED
GLOBULIN SER CALC-MCNC: 3.3 G/DL (ref 2–4)
GLUCOSE SERPL-MCNC: 148 MG/DL (ref 65–100)
GLUCOSE UR STRIP.AUTO-MCNC: NEGATIVE MG/DL
HCT VFR BLD AUTO: 40.1 % (ref 35–47)
HGB BLD-MCNC: 13.1 G/DL (ref 11.5–16)
HGB UR QL STRIP: NEGATIVE
IMM GRANULOCYTES # BLD AUTO: 0 K/UL (ref 0–0.04)
IMM GRANULOCYTES NFR BLD AUTO: 0 % (ref 0–0.5)
KETONES UR QL STRIP.AUTO: NEGATIVE MG/DL
LEUKOCYTE ESTERASE UR QL STRIP.AUTO: ABNORMAL
LYMPHOCYTES # BLD: 4.2 K/UL (ref 0.8–3.5)
LYMPHOCYTES NFR BLD: 56 % (ref 12–49)
MCH RBC QN AUTO: 32.3 PG (ref 26–34)
MCHC RBC AUTO-ENTMCNC: 32.7 G/DL (ref 30–36.5)
MCV RBC AUTO: 99 FL (ref 80–99)
METHADONE UR QL: NEGATIVE
MONOCYTES # BLD: 0.5 K/UL (ref 0–1)
MONOCYTES NFR BLD: 6 % (ref 5–13)
NEUTS SEG # BLD: 2.7 K/UL (ref 1.8–8)
NEUTS SEG NFR BLD: 36 % (ref 32–75)
NITRITE UR QL STRIP.AUTO: NEGATIVE
NRBC # BLD: 0 K/UL (ref 0–0.01)
NRBC BLD-RTO: 0 PER 100 WBC
OPIATES UR QL: NEGATIVE
PCP UR QL: NEGATIVE
PH UR STRIP: 7 [PH] (ref 5–8)
PLATELET # BLD AUTO: 209 K/UL (ref 150–400)
PMV BLD AUTO: 10.4 FL (ref 8.9–12.9)
POTASSIUM SERPL-SCNC: 3.3 MMOL/L (ref 3.5–5.1)
PROT SERPL-MCNC: 6.9 G/DL (ref 6.4–8.2)
PROT UR STRIP-MCNC: NEGATIVE MG/DL
RBC # BLD AUTO: 4.05 M/UL (ref 3.8–5.2)
RBC #/AREA URNS HPF: ABNORMAL /HPF (ref 0–5)
SARS-COV-2, COV2: DETECTED
SODIUM SERPL-SCNC: 145 MMOL/L (ref 136–145)
SP GR UR REFRACTOMETRY: 1.02 (ref 1–1.03)
UA: UC IF INDICATED,UAUC: ABNORMAL
UROBILINOGEN UR QL STRIP.AUTO: 0.2 EU/DL (ref 0.2–1)
VALPROATE SERPL-MCNC: <3 UG/ML (ref 50–100)
WBC # BLD AUTO: 7.5 K/UL (ref 3.6–11)
WBC URNS QL MICRO: ABNORMAL /HPF (ref 0–4)

## 2022-01-13 PROCEDURE — 65220000003 HC RM SEMIPRIVATE PSYCH

## 2022-01-13 PROCEDURE — 80164 ASSAY DIPROPYLACETIC ACD TOT: CPT

## 2022-01-13 PROCEDURE — 99285 EMERGENCY DEPT VISIT HI MDM: CPT

## 2022-01-13 PROCEDURE — 87636 SARSCOV2 & INF A&B AMP PRB: CPT

## 2022-01-13 PROCEDURE — 82077 ASSAY SPEC XCP UR&BREATH IA: CPT

## 2022-01-13 PROCEDURE — 36415 COLL VENOUS BLD VENIPUNCTURE: CPT

## 2022-01-13 PROCEDURE — 74011250637 HC RX REV CODE- 250/637: Performed by: PHYSICIAN ASSISTANT

## 2022-01-13 PROCEDURE — 80053 COMPREHEN METABOLIC PANEL: CPT

## 2022-01-13 PROCEDURE — 85025 COMPLETE CBC W/AUTO DIFF WBC: CPT

## 2022-01-13 PROCEDURE — 80307 DRUG TEST PRSMV CHEM ANLYZR: CPT

## 2022-01-13 PROCEDURE — 81001 URINALYSIS AUTO W/SCOPE: CPT

## 2022-01-13 RX ORDER — POTASSIUM CHLORIDE 750 MG/1
20 TABLET, FILM COATED, EXTENDED RELEASE ORAL
Status: COMPLETED | OUTPATIENT
Start: 2022-01-13 | End: 2022-01-13

## 2022-01-13 RX ORDER — TRAZODONE HYDROCHLORIDE 50 MG/1
50 TABLET ORAL
Status: DISCONTINUED | OUTPATIENT
Start: 2022-01-13 | End: 2022-01-18

## 2022-01-13 RX ORDER — HYDROXYZINE 50 MG/1
50 TABLET, FILM COATED ORAL
Status: DISCONTINUED | OUTPATIENT
Start: 2022-01-13 | End: 2022-01-19

## 2022-01-13 RX ORDER — MAG HYDROX/ALUMINUM HYD/SIMETH 200-200-20
30 SUSPENSION, ORAL (FINAL DOSE FORM) ORAL
Status: DISCONTINUED | OUTPATIENT
Start: 2022-01-13 | End: 2022-01-22 | Stop reason: HOSPADM

## 2022-01-13 RX ORDER — ACETAMINOPHEN 325 MG/1
650 TABLET ORAL
Status: DISCONTINUED | OUTPATIENT
Start: 2022-01-13 | End: 2022-01-22 | Stop reason: HOSPADM

## 2022-01-13 RX ORDER — ADHESIVE BANDAGE
30 BANDAGE TOPICAL DAILY PRN
Status: DISCONTINUED | OUTPATIENT
Start: 2022-01-13 | End: 2022-01-22 | Stop reason: HOSPADM

## 2022-01-13 RX ADMIN — POTASSIUM CHLORIDE 20 MEQ: 750 TABLET, EXTENDED RELEASE ORAL at 13:35
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
## 2022-01-13 NOTE — ED NOTES
Hourly rounding completed on this pt. Offered assistance for toileting or hygiene at this time. Provided opportunity for snack nourishment or PO fluid hydration. Pt is up-to-date on plan of care. No pain interventions required at this time. Warm blanket offered,safety precautions in place, bed locked and in the lowest position.

## 2022-01-13 NOTE — ED TRIAGE NOTES
Arrived with police under ECO. Pt denies SI/HI. Pt reports she does not take any medications except for flagyl for a current UTI. Pt paranoid, reports the government has been taking her packages and hasn't been letting her go to [facility] because she doesn't have her booster.
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
## 2022-01-13 NOTE — ED PROVIDER NOTES
EMERGENCY DEPARTMENT HISTORY AND PHYSICAL EXAM      Date: 1/13/2022  Patient Name: Pratima Cerna    History of Presenting Illness     Chief Complaint   Patient presents with    Mental Health Problem     History Provided By: Patient    HPI: Pratima Cerna, 63 y.o. female with medical history significant for diabetes, hypercholesterolemia, schizoaffective disorder, anxiety, bipolar disorder, tubal ligation, cholecystectomy who presents via police custody under [employer] to the ED with cc of acute moderate delusions and refusing to take her medications exacerbated X 1 day. Her BHA is reported that the patient has not been taking her medicines and has been having significant delusions and arguing with her neighbors. Patient has been endorsing paranoia and reports that the government has been taking her packages and has not been letting her go to [employer] because she does not have her booster. No SI, HI, illicit substance abuse, alcohol abuse. No modifying factors prior to arrival.  No fever, chills, chest pain, shortness of breath, nausea, vomiting. PCP: Jessica De La Fuente, NP    There are no other complaints, changes, or physical findings at this time. No current facility-administered medications on file prior to encounter. Current Outpatient Medications on File Prior to Encounter   Medication Sig Dispense Refill    lidocaine (LIDODERM) 5 % Apply patch to the affected area for 12 hours a day and remove for 12 hours a day. 6 Each 0    HYDROcodone-acetaminophen (NORCO) 5-325 mg per tablet Take 1 Tab by mouth every four (4) hours as needed for Pain. Max Daily Amount: 6 Tabs. 10 Tab 0    ibuprofen (MOTRIN) 800 mg tablet Take 800 mg by mouth every eight (8) hours as needed for Pain.  metFORMIN (GLUCOPHAGE) 500 mg tablet Take 500 mg by mouth two (2) times daily (with meals).  lovastatin (MEVACOR) 40 mg tablet Take 40 mg by mouth nightly.       benztropine (COGENTIN) 0.5 mg tablet Take 0.5 mg by mouth nightly.  divalproex ER (DEPAKOTE ER) 500 mg ER tablet Take 500 mg by mouth nightly.  escitalopram oxalate (LEXAPRO) 20 mg tablet Take 20 mg by mouth daily.  haloperidol (HALDOL) 10 mg tablet Take 10 mg by mouth nightly.  POTASSIUM CHLORIDE SR 10 MEQ TAB 10 mEq daily.  ziprasidone (GEODON) 80 mg capsule Take 80 mg by mouth nightly. Past History     Past Medical History:  Past Medical History:   Diagnosis Date    Diabetes ([facility] [address]     High cholesterol     Other ill-defined conditions(799.89)     hypercholesteremia    Psychiatric disorder     SCHIZOAFFECTIVE DISORDER, anxiety, bipolar    Schizoaffective disorder ([facility] [address]      Past Surgical History:  Past Surgical History:   Procedure Laterality Date    HX [facility]LECYSTECTOMY  03/03/2017    [facility] ojhjg-Y[facility]-LvDr. ALEXANDREA Yuan    HX TUBAL LIGATION  1991     Family History:  History reviewed. No pertinent family history. Social History:  Social History     Tobacco Use    Smoking status: Never Smoker    Smokeless tobacco: Never Used   Substance Use Topics    Alcohol use: No    Drug use: No     Allergies: Allergies   Allergen Reactions    Penicillins Rash     Review of Systems   Review of Systems   Constitutional: Positive for activity change. Negative for appetite change, chills, diaphoresis, fatigue, fever and unexpected weight change. HENT: Negative for congestion, ear pain, postnasal drip, rhinorrhea, sinus pressure, sneezing and sore throat. Eyes: Negative for photophobia, pain and visual disturbance. Respiratory: Negative for apnea, cough and shortness of breath. Cardiovascular: Negative for chest pain, palpitations and leg swelling. Gastrointestinal: Negative for abdominal pain, constipation, diarrhea, nausea and vomiting. Genitourinary: Negative for dysuria, frequency and urgency. Musculoskeletal: Negative for arthralgias and myalgias. Skin: Negative for rash.    Neurological: Negative for seizures, weakness and numbness. Psychiatric/Behavioral: Positive for agitation, behavioral problems, decreased concentration, dysphoric mood and sleep disturbance. Negative for confusion, hallucinations, self-injury and suicidal ideas. The patient is not nervous/anxious and is not hyperactive. Physical Exam   Physical Exam  Vitals and nursing note reviewed. Constitutional:       General: She is not in acute distress. Appearance: She is well-developed. She is obese. She is not ill-appearing, toxic-appearing or diaphoretic. HENT:      Head: Normocephalic and atraumatic. Right Ear: Hearing and external ear normal.      Left Ear: Hearing and external ear normal.      Nose: Nose normal.   Eyes:      Conjunctiva/sclera: Conjunctivae normal.      Pupils: Pupils are equal, round, and reactive to light. Cardiovascular:      Rate and Rhythm: Normal rate and regular rhythm. Pulses: Normal pulses. Heart sounds: Normal heart sounds. Pulmonary:      Effort: Pulmonary effort is normal. No respiratory distress. Musculoskeletal:         General: Normal range of motion. Cervical back: Normal range of motion. Skin:     General: Skin is warm and dry. Neurological:      General: No focal deficit present. Mental Status: She is alert and oriented to person, place, and time. Motor: Motor function is intact. Gait: Gait is intact. Psychiatric:         Mood and Affect: Affect is angry. Affect is not tearful. Behavior: Behavior is agitated. Behavior is not slowed, withdrawn or combative. Behavior is cooperative. Thought Content: Thought content is paranoid and delusional. Thought content does not include homicidal or suicidal ideation. Thought content does not include homicidal or suicidal plan.        Diagnostic Study Results   Labs -     Recent Results (from the past 12 hour(s))   ETHYL ALCOHOL    Collection Time: 01/13/22 12:30 PM   Result Value Ref Range    ALCOHOL(ETHYL),SERUM <10 <10 MG/DL   CBC WITH AUTOMATED DIFF    Collection Time: 01/13/22 12:30 PM   Result Value Ref Range    WBC 7.5 3.6 - 11.0 K/uL    RBC 4.05 3.80 - 5.20 M/uL    HGB 13.1 11.5 - 16.0 g/dL    HCT 40.1 35.0 - 47.0 %    MCV 99.0 80.0 - 99.0 FL    MCH 32.3 26.0 - 34.0 PG    MCHC 32.7 30.0 - 36.5 g/dL    RDW 13.1 11.5 - 14.5 %    PLATELET 690 860 - 360 K/uL    MPV 10.4 8.9 - 12.9 FL    NRBC 0.0 0  WBC    ABSOLUTE NRBC 0.00 0.00 - 0.01 K/uL    NEUTROPHILS 36 32 - 75 %    LYMPHOCYTES 56 (H) 12 - 49 %    MONOCYTES 6 5 - 13 %    EOSINOPHILS 1 0 - 7 %    BASOPHILS 1 0 - 1 %    IMMATURE GRANULOCYTES 0 0.0 - 0.5 %    ABS. NEUTROPHILS 2.7 1.8 - 8.0 K/UL    ABS. LYMPHOCYTES 4.2 (H) 0.8 - 3.5 K/UL    ABS. MONOCYTES 0.5 0.0 - 1.0 K/UL    ABS. EOSINOPHILS 0.1 0.0 - 0.4 K/UL    ABS. BASOPHILS 0.0 0.0 - 0.1 K/UL    ABS. IMM. GRANS. 0.0 0.00 - 0.04 K/UL    DF AUTOMATED     METABOLIC PANEL, COMPREHENSIVE    Collection Time: 01/13/22 12:30 PM   Result Value Ref Range    Sodium 145 136 - 145 mmol/L    Potassium 3.3 (L) 3.5 - 5.1 mmol/L    Chloride 106 97 - 108 mmol/L    CO2 29 21 - 32 mmol/L    Anion gap 10 5 - 15 mmol/L    Glucose 148 (H) 65 - 100 mg/dL    BUN 12 6 - 20 MG/DL    Creatinine 0.80 0.55 - 1.02 MG/DL    BUN/Creatinine ratio 15 12 - 20      GFR est AA >60 >60 ml/min/1.73m2    GFR est non-AA >60 >60 ml/min/1.73m2    Calcium 9.0 8.5 - 10.1 MG/DL    Bilirubin, total 0.4 0.2 - 1.0 MG/DL    ALT (SGPT) 36 12 - 78 U/L    AST (SGOT) 24 15 - 37 U/L    Alk.  phosphatase 89 45 - 117 U/L    Protein, total 6.9 6.4 - 8.2 g/dL    Albumin 3.6 3.5 - 5.0 g/dL    Globulin 3.3 2.0 - 4.0 g/dL    A-G Ratio 1.1 1.1 - 2.2     DRUG SCREEN, URINE    Collection Time: 01/13/22 12:30 PM   Result Value Ref Range    AMPHETAMINES Negative NEG      BARBITURATES Negative NEG      BENZODIAZEPINES Negative NEG      COCAINE Negative NEG      METHADONE Negative NEG      OPIATES Negative NEG      PCP(PHENCYCLIDINE) Negative NEG      THC (TH-CANNABINOL) Negative NEG Drug screen comment (NOTE)    URINALYSIS W/ REFLEX CULTURE    Collection Time: 01/13/22 12:30 PM    Specimen: Urine   Result Value Ref Range    Color DARK YELLOW      Appearance CLOUDY (A) CLEAR      Specific gravity 1.020 1.003 - 1.030      pH (UA) 7.0 5.0 - 8.0      Protein Negative NEG mg/dL    Glucose Negative NEG mg/dL    Ketone Negative NEG mg/dL    Bilirubin Negative NEG      Blood Negative NEG      Urobilinogen 0.2 0.2 - 1.0 EU/dL    Nitrites Negative NEG      Leukocyte Esterase MODERATE (A) NEG      WBC 5-10 0 - 4 /hpf    RBC 0-5 0 - 5 /hpf    Epithelial cells FEW FEW /lpf    Bacteria Negative NEG /hpf    UA:UC IF INDICATED CULTURE NOT INDICATED BY UA RESULT CNI     VALPROIC ACID    Collection Time: 01/13/22 12:30 PM   Result Value Ref Range    Valproic acid <3 (L) 50 - 100 ug/ml   COVID-19 WITH INFLUENZA A/B    Collection Time: 01/13/22 12:30 PM   Result Value Ref Range    SARS-CoV-2 Detected (A) NOTD      Influenza A by PCR Not detected      Influenza B by PCR Not detected         Radiologic Studies -   No orders to display     No results found. Medical Decision Making   I am the first provider for this patient. I reviewed the vital signs, available nursing notes, past medical history, past surgical history, family history and social history. Vital Signs-Reviewed the patient's vital signs. Patient Vitals for the past 24 hrs:   Temp Pulse Resp BP SpO2   01/13/22 1857 -- 73 18 125/65 98 %   01/13/22 1203 97.9 °F (36.6 °C) 74 18 138/77 98 %     Pulse Oximetry Analysis - 98% on RA (normal)    Records Reviewed: Nursing Notes, Old Medical Records, Previous Radiology Studies and Previous Laboratory Studies    Provider Notes (Medical Decision Making):   Patient presents with delusional disorder and refusing to take her medications (per [facility]). DDx:  2/2 MDD, schizoaffective d/o, bipolar, drug induced, organic cause such as electrolyte anomoly or infection.  Will get psych labs and speak with mental health professional about possible admission. ED Course:   Initial assessment performed. The patients presenting problems have been discussed, and they are in agreement with the care plan formulated and outlined with them. I have encouraged them to ask questions as they arise throughout their visit. ED Course as of 01/13/22 2033   Thu Jan 13, 2022   1347 Patient is medically cleared at this time other than COVID positive. She is asymptomatic. Waiting on bed placement. []   7947 Pt eating lunch calmly in room. []   3198 Patient potentially being transferred to [facility] for psychiatric care. Accepting doctor pending. [SM]      ED Course User Index  [SM] Julia Foss PA-C         Disposition:  TRANSFER NOTE:  8:33 PM  Pt is being transferred to Psychiatry at [facility], transfer accepted by Dr. Elinor Toscano. The reasons for pt's transfer have been discussed with the pt and available family. They convey agreement and understanding for the need to be transferred as explained to them by myself. Diagnosis     Clinical Impression:   1. Schizoaffective disorder, unspecified type ([address]    2. Noncompliance with medications    3. COVID-19            Please note that this dictation was completed with Dragon, computer voice recognition software. Quite often unanticipated grammatical, syntax, homophones, and other interpretive errors are inadvertently transcribed by the computer software. Please disregard these errors. Additionally, please excuse any errors that have escaped final proofreading.

## 2022-01-14 PROCEDURE — 74011250637 HC RX REV CODE- 250/637: Performed by: PSYCHIATRY & NEUROLOGY

## 2022-01-14 PROCEDURE — 65220000003 HC RM SEMIPRIVATE PSYCH

## 2022-01-14 RX ORDER — RISPERIDONE 1 MG/1
1 TABLET, FILM COATED ORAL 2 TIMES DAILY
Status: DISCONTINUED | OUTPATIENT
Start: 2022-01-14 | End: 2022-01-17

## 2022-01-14 RX ORDER — METRONIDAZOLE 500 MG/1
500 TABLET ORAL EVERY 12 HOURS
Status: COMPLETED | OUTPATIENT
Start: 2022-01-14 | End: 2022-01-16

## 2022-01-14 RX ORDER — METFORMIN HYDROCHLORIDE 500 MG/1
500 TABLET ORAL 2 TIMES DAILY WITH MEALS
Status: DISCONTINUED | OUTPATIENT
Start: 2022-01-14 | End: 2022-01-22 | Stop reason: HOSPADM

## 2022-01-14 RX ADMIN — RISPERIDONE 1 MG: 1 TABLET ORAL at 21:03

## 2022-01-14 RX ADMIN — METFORMIN HYDROCHLORIDE 500 MG: 500 TABLET ORAL at 17:25

## 2022-01-14 RX ADMIN — METFORMIN HYDROCHLORIDE 500 MG: 500 TABLET ORAL at 08:51

## 2022-01-14 RX ADMIN — RISPERIDONE 1 MG: 1 TABLET ORAL at 08:52

## 2022-01-14 RX ADMIN — METRONIDAZOLE 500 MG: 500 TABLET ORAL at 08:52

## 2022-01-14 RX ADMIN — METRONIDAZOLE 500 MG: 500 TABLET ORAL at 21:03

## 2022-01-14 NOTE — BH NOTES
PSA PART II ADDITIONAL INFORMATION        Access To Fire Arms: No    Substance Use: NO    Last Use:     Type of Substance: no substance use    Frequency of Use:     Request to See : NO    If yes, notified : NO    Release of Information Signed: YES    Release of Information Signed For: Antonietta Velazquez ( with [facility]): [phone]    Treatment Plan: pt reviewed and signed

## 2022-01-14 NOTE — GROUP NOTE
[facility] GROUP DOCUMENTATION INDIVIDUAL                                                                          Group Therapy Note    Date: 1/14/2022    Group Start Time: 1315  Group End Time: 1400  Group Topic: Process Group - Inpatient    [facility]    Sadechano Downs    IP [address] [facility] GROUP DOCUMENTATION GROUP    Group Therapy Note  The therapist encouraged the pt's to process their thoughts in feelings, and encourage support and feedback to their peers. The main theme of the group was self-care. Attendees: 7         Attendance: Did not attend    Patient's Goal:      Interventions/techniques: Follows Directions:     Interactions:     Mental Status:     Behavior/appearance:     Goals Achieved: Additional Notes: The pt was invited but did not attend.      Yung Mazariegos

## 2022-01-14 NOTE — BH NOTES
Behavioral Health Treatment Team Note     Patient goal(s) for today: ***  Treatment team focus/goals: ***    Progress note: ***    LOS:  1  Expected LOS: ***    Insurance info/prescription coverage:  ***  Date of last family contact:  ***  Family requesting physician contact today:  {Yes/No:19414::\"no\"}  Discharge plan:  ***  Guns in the home:  {Yes/No :19414::\"no\"}   Outpatient provider(s):  ***    Participating treatment team members: Jaylin Santillan, * (assigned SW), ***

## 2022-01-14 NOTE — BH NOTES
Pt.is up and visible on unit, affect is flat, appetite good, appearance is fairly neat, denies feeling suicidal,mood is labile, no aggressive behaviors at present, pt. In denial of hospitalization, states the police bought me here from the laundry room I was just doing my clothes and people were stealing from me.pt. is paranoid, suspicious, is accepting of medications. no other concerns voiced. no other concerns voiced, remains on close observation.

## 2022-01-14 NOTE — ED NOTES
Patient discharged to [facility] at this time with [employer] as a TDO. Patient  Sent with all belongings via [employer]. All questions answered. TCopy of TDO and EMTALA sent with officers.
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
## 2022-01-14 NOTE — PROGRESS NOTES
Problem: Risk for Fluid Volume Deficit  Goal: Maintain normal heart rhythm  Outcome: Progressing Towards Goal     Problem: Psychosis  Goal: *STG: Remains safe in hospital  Outcome: Progressing Towards Goal

## 2022-01-14 NOTE — CONSULTS
Consult    Patient: Laney Peters MRN: [id number]  SSN: [id number]    YOB: 1960  Age: 63 y.o. Sex: female      Subjective:      Laney Peters is a 63 y.o. female who is being seen for elevated cholesterol panel schizophrenia admitted for COVID-19 and psychosis. Patient has no shortness of breath is asymptomatic otherwise    Past Medical History:   Diagnosis Date    Diabetes ([facility] [address]     High cholesterol     Other ill-defined conditions(799.89)     hypercholesteremia    Psychiatric disorder     SCHIZOAFFECTIVE DISORDER, anxiety, bipolar    Schizoaffective disorder ([facility] [address]      Past Surgical History:   Procedure Laterality Date    HX [facility]LECYSTECTOMY  03/03/2017    [id number]-TKN[facility]PzDr. Lydia Echeverria    HX TUBAL LIGATION  1991      No family history on file.   Social History     Tobacco Use    Smoking status: Never Smoker    Smokeless tobacco: Never Used   Substance Use Topics    Alcohol use: No      Current Facility-Administered Medications   Medication Dose Route Frequency Provider Last Rate Last Admin    metFORMIN (GLUCOPHAGE) tablet 500 mg  500 mg Oral BID WITH MEALS Anastasiia Moreno MD   500 mg at 01/14/22 0851    risperiDONE (RisperDAL) tablet 1 mg  1 mg Oral BID Katty Whalen MD   1 mg at 01/14/22 4957    metroNIDAZOLE (FLAGYL) tablet 500 mg  500 mg Oral Q12H Katty Whalen MD   500 mg at 01/14/22 5818    hydrOXYzine HCL (ATARAX) tablet 50 mg  50 mg Oral TID PRN Katty Whalen MD        traZODone (DESYREL) tablet 50 mg  50 mg Oral QHS PRN Katty Whalen MD        acetaminophen (TYLENOL) tablet 650 mg  650 mg Oral Q4H PRN Katty Whalen MD        magnesium hydroxide (MILK OF MAGNESIA) 400 mg/5 mL oral suspension 30 mL  30 mL Oral DAILY PRN Katty Whalen MD        alum-mag hydroxide-simeth (MYLANTA) oral suspension 30 mL  30 mL Oral Q4H PRN Katty Whalen MD            Allergies   Allergen Reactions    Penicillins Rash       Review of Systems:  A comprehensive review of systems was negative except for that written in the History of Present Illness. Objective:     Vitals:    01/14/22 0608 01/14/22 0801 01/14/22 0823   BP: 117/69 117/70 117/70   Pulse: 78 77 77   Resp: 18 16 16   Temp: 98.2 °F (36.8 °C) 98.4 °F (36.9 °C) 98.4 °F (36.9 °C)        Physical Exam:  General:  Alert, cooperative, no distress, appears stated age. Eyes:  Conjunctivae/corneas clear. PERRL, EOMs intact. Fundi benign   Ears:  Normal TMs and external ear canals both ears. Nose: Nares normal. Septum midline. Mucosa normal. No drainage or sinus tenderness. Mouth/Throat: Lips, mucosa, and tongue normal. Teeth and gums normal.   Neck: Supple, symmetrical, trachea midline, no adenopathy, thyroid: no enlargment/tenderness/nodules, no carotid bruit and no JVD. Back:   Symmetric, no curvature. ROM normal. No CVA tenderness. Lungs:   Clear to auscultation bilaterally. Heart:  Regular rate and rhythm, S1, S2 normal, no murmur, click, rub or gallop. Abdomen:   Soft, non-tender. Bowel sounds normal. No masses,  No organomegaly. Extremities: Extremities normal, atraumatic, no cyanosis or edema. Pulses: 2+ and symmetric all extremities. Skin: Skin color, texture, turgor normal. No rashes or lesions   Lymph nodes: Cervical, supraclavicular, and axillary nodes normal.   Neurologic: CNII-XII intact. Normal strength, sensation and reflexes throughout.        Recent Results (from the past 24 hour(s))   ETHYL ALCOHOL    Collection Time: 01/13/22 12:30 PM   Result Value Ref Range    ALCOHOL(ETHYL),SERUM <10 <10 MG/DL   CBC WITH AUTOMATED DIFF    Collection Time: 01/13/22 12:30 PM   Result Value Ref Range    WBC 7.5 3.6 - 11.0 K/uL    RBC 4.05 3.80 - 5.20 M/uL    HGB 13.1 11.5 - 16.0 g/dL    HCT 40.1 35.0 - 47.0 %    MCV 99.0 80.0 - 99.0 FL    MCH 32.3 26.0 - 34.0 PG    MCHC 32.7 30.0 - 36.5 g/dL    RDW 13.1 11.5 - 14.5 %    PLATELET 140 236 - 236 K/uL    MPV 10.4 8.9 - 12.9 FL    NRBC 0.0 0  WBC    ABSOLUTE NRBC 0.00 0.00 - 0.01 K/uL    NEUTROPHILS 36 32 - 75 %    LYMPHOCYTES 56 (H) 12 - 49 %    MONOCYTES 6 5 - 13 %    EOSINOPHILS 1 0 - 7 %    BASOPHILS 1 0 - 1 %    IMMATURE GRANULOCYTES 0 0.0 - 0.5 %    ABS. NEUTROPHILS 2.7 1.8 - 8.0 K/UL    ABS. LYMPHOCYTES 4.2 (H) 0.8 - 3.5 K/UL    ABS. MONOCYTES 0.5 0.0 - 1.0 K/UL    ABS. EOSINOPHILS 0.1 0.0 - 0.4 K/UL    ABS. BASOPHILS 0.0 0.0 - 0.1 K/UL    ABS. IMM. GRANS. 0.0 0.00 - 0.04 K/UL    DF AUTOMATED     METABOLIC PANEL, COMPREHENSIVE    Collection Time: 01/13/22 12:30 PM   Result Value Ref Range    Sodium 145 136 - 145 mmol/L    Potassium 3.3 (L) 3.5 - 5.1 mmol/L    Chloride 106 97 - 108 mmol/L    CO2 29 21 - 32 mmol/L    Anion gap 10 5 - 15 mmol/L    Glucose 148 (H) 65 - 100 mg/dL    BUN 12 6 - 20 MG/DL    Creatinine 0.80 0.55 - 1.02 MG/DL    BUN/Creatinine ratio 15 12 - 20      GFR est AA >60 >60 ml/min/1.73m2    GFR est non-AA >60 >60 ml/min/1.73m2    Calcium 9.0 8.5 - 10.1 MG/DL    Bilirubin, total 0.4 0.2 - 1.0 MG/DL    ALT (SGPT) 36 12 - 78 U/L    AST (SGOT) 24 15 - 37 U/L    Alk.  phosphatase 89 45 - 117 U/L    Protein, total 6.9 6.4 - 8.2 g/dL    Albumin 3.6 3.5 - 5.0 g/dL    Globulin 3.3 2.0 - 4.0 g/dL    A-G Ratio 1.1 1.1 - 2.2     DRUG SCREEN, URINE    Collection Time: 01/13/22 12:30 PM   Result Value Ref Range    AMPHETAMINES Negative NEG      BARBITURATES Negative NEG      BENZODIAZEPINES Negative NEG      COCAINE Negative NEG      METHADONE Negative NEG      OPIATES Negative NEG      PCP(PHENCYCLIDINE) Negative NEG      THC (TH-CANNABINOL) Negative NEG      Drug screen comment (NOTE)    URINALYSIS W/ REFLEX CULTURE    Collection Time: 01/13/22 12:30 PM    Specimen: Urine   Result Value Ref Range    Color DARK YELLOW      Appearance CLOUDY (A) CLEAR      Specific gravity 1.020 1.003 - 1.030      pH (UA) 7.0 5.0 - 8.0      Protein Negative NEG mg/dL    Glucose Negative NEG mg/dL    Ketone Negative NEG mg/dL    Bilirubin Negative NEG      Blood Negative NEG      Urobilinogen 0.2 0.2 - 1.0 EU/dL    Nitrites Negative NEG      Leukocyte Esterase MODERATE (A) NEG      WBC 5-10 0 - 4 /hpf    RBC 0-5 0 - 5 /hpf    Epithelial cells FEW FEW /lpf    Bacteria Negative NEG /hpf    UA:UC IF INDICATED CULTURE NOT INDICATED BY UA RESULT CNI     VALPROIC ACID    Collection Time: 01/13/22 12:30 PM   Result Value Ref Range    Valproic acid <3 (L) 50 - 100 ug/ml   COVID-19 WITH INFLUENZA A/B    Collection Time: 01/13/22 12:30 PM   Result Value Ref Range    SARS-CoV-2 Detected (A) NOTD      Influenza A by PCR Not detected      Influenza B by PCR Not detected         Assessment:     Hospital Problems  Date Reviewed: 3/20/2017          Codes Class Noted POA    Psychosis ([facility] ICD-10-CM: U17  ICD-9-CM: 298.9  1/13/2022 Unknown          Diabetes mellitus type 2  COVID-19  Paranoid schizophrenia    Plan:     Continue with home medications    Signed By: Neena Caldwell MD     January 14, 2022

## 2022-01-14 NOTE — ED NOTES
TRANSFER - OUT REPORT:    Verbal report given to Karma Ambriz (name) on Lena Correa  being transferred to [facility](unit) for routine progression of care       Report consisted of patients Situation, Background, Assessment and   Recommendations(SBAR). Information from the following report(s) SBAR was reviewed with the receiving nurse. Lines:       Opportunity for questions and clarification was provided.       Patient transported with:

## 2022-01-14 NOTE — BH NOTES
PSYCHOSOCIAL ASSESSMENT  :Patient identifying info:   Bella Knight is a 63 y.o., female admitted 1/13/2022 10:02 PM     Presenting problem and precipitating factors: pt was brought to the hospital after being brought to ED under an ECO. Pt has not been on her medications since Christmas, pt told this writer that the medicine is making her groggy and not feeling well. Pt reports that the police brought her here 'by mistake' and that she does not need to be in the hospital    Mental status assessment: Pt presents with a flat affect and congruent mood. Pt denies SI/HI/AVH but presents as internally preoccupied and paranoid. Pt reports that the doctor is giving her the wrong medicine 'on purpose'.  Pt presents as slightly disheveled, with minimal insight and poor judgement into mental health    Strengths:'strength'    Collateral information: Angel ( with [facility]): [phone]    Current psychiatric /substance abuse providers and contact info: [facility]    Previous psychiatric/substance abuse providers and response to treatment: pt has hx of multiple hospitalizations     Family history of mental illness or substance abuse: pt unable to determine     Substance abuse history:    Social History     Tobacco Use    Smoking status: Never Smoker    Smokeless tobacco: Never Used   Substance Use Topics    Alcohol use: No       History of biomedical complications associated with substance abuse: n/a    Patient's current acceptance of treatment or motivation for change: pt does not believe she needs inpatient treatment at this time     Family constellation: pt has two adult sons, three siblings    Is significant other involved? n/a    Describe support system: [facility]    Describe living arrangements and home environment: pt lives with one of her sons     Health issues:   Hospital Problems  Date Reviewed: 3/20/2017          Codes Class Noted POA    [facility] ICD-10-CM: Q45  ICD-9-CM: 298.9  1/13/2022 Unknown Trauma history: n/a    Legal issues: pt denied    History of  service: pt denied    Financial status: receives disability    Sabianist/cultural factors: n/a    Education/work history: pt completed 12th grade    Have you been licensed as a health care professional (current or ): n/a    Leisure and recreation preferences: colouring, word searches     Describe coping skills:colouring, word searchers, tv     [employer]  2022

## 2022-01-15 PROCEDURE — 74011250637 HC RX REV CODE- 250/637: Performed by: PSYCHIATRY & NEUROLOGY

## 2022-01-15 PROCEDURE — 65220000003 HC RM SEMIPRIVATE PSYCH

## 2022-01-15 RX ADMIN — RISPERIDONE 1 MG: 1 TABLET ORAL at 08:21

## 2022-01-15 RX ADMIN — METFORMIN HYDROCHLORIDE 500 MG: 500 TABLET ORAL at 17:14

## 2022-01-15 RX ADMIN — METRONIDAZOLE 500 MG: 500 TABLET ORAL at 08:21

## 2022-01-15 RX ADMIN — RISPERIDONE 1 MG: 1 TABLET ORAL at 20:34

## 2022-01-15 RX ADMIN — METFORMIN HYDROCHLORIDE 500 MG: 500 TABLET ORAL at 08:21

## 2022-01-15 RX ADMIN — METRONIDAZOLE 500 MG: 500 TABLET ORAL at 20:34

## 2022-01-15 NOTE — BH NOTES
INITIAL PSYCHIATRIC EVALUATION            IDENTIFICATION:    Patient Name  Billy Fleming   Date of Birth 1960   [facility] [id number]   Medical Record Number  [id number]      Age  63 y.o. PCP Bryn Obregon NP   Admit date:  1/13/2022    Room Number  235/01  @ [address] [facility]   Date of Service   1/14/2022            HISTORY       REASON FOR HOSPITALIZATION:  CC: Delusional, TDO refusing to take medication, transferred from  [facility] bed   HISTORY OF PRESENT ILLNESS:    Patient 62-year-old -American female with history of schizoaffective disorder, bipolar disorder and anxiety presented to the ED with reported delusions, refusing to take medications and has been continuing to worsen. Patient has been reported arguing with her neighbors, endorsing paranoia, government has been taking her packages and has been not letting her to go to [facility]. Patient also had presented with delusional statements towards her physician, medications. Patient presented with police custody under ECO to the ED. Patient denies auditory or visual hallucinations. Denies any active suicidal or homicidal feelings. Past psychiatric history-history of treatment for schizoaffective disorder    Trauma abuse history-patient did not share    Legal history-patient denies    Review of systems-no nausea or vomiting, no chest pain, no shortness of breath     ALLERGIES:   Allergies   Allergen Reactions    Penicillins Rash      MEDICATIONS PRIOR TO ADMISSION:   Medications Prior to Admission   Medication Sig    lidocaine (LIDODERM) 5 % Apply patch to the affected area for 12 hours a day and remove for 12 hours a day.  HYDROcodone-acetaminophen (NORCO) 5-325 mg per tablet Take 1 Tab by mouth every four (4) hours as needed for Pain. Max Daily Amount: 6 Tabs.  ibuprofen (MOTRIN) 800 mg tablet Take 800 mg by mouth every eight (8) hours as needed for Pain.     metFORMIN (GLUCOPHAGE) 500 mg tablet Take 500 mg by mouth two (2) times daily (with meals).  lovastatin (MEVACOR) 40 mg tablet Take 40 mg by mouth nightly.  benztropine (COGENTIN) 0.5 mg tablet Take 0.5 mg by mouth nightly.  divalproex ER (DEPAKOTE ER) 500 mg ER tablet Take 500 mg by mouth nightly.  escitalopram oxalate (LEXAPRO) 20 mg tablet Take 20 mg by mouth daily.  haloperidol (HALDOL) 10 mg tablet Take 10 mg by mouth nightly.  POTASSIUM CHLORIDE SR 10 MEQ TAB 10 mEq daily.  ziprasidone (GEODON) 80 mg capsule Take 80 mg by mouth nightly. PAST MEDICAL HISTORY:   Past Medical History:   Diagnosis Date    Diabetes ([facility]     High cholesterol     Other ill-defined conditions(799.89)     hypercholesteremia    Psychiatric disorder     SCHIZOAFFECTIVE DISORDER, anxiety, bipolar    Schizoaffective disorder (HCC)      Past Surgical History:   Procedure Laterality Date    HX CHOLECYSTECTOMY  03/03/2017    beto colmenaresjlomy-BNW-[facility]    HX TUBAL LIGATION  1991      SOCIAL HISTORY:    Social History     Socioeconomic History    Marital status:      Spouse name: Not on file    Number of children: Not on file    Years of education: Not on file    Highest education level: Not on file   Occupational History    Not on file   Tobacco Use    Smoking status: Never Smoker    Smokeless tobacco: Never Used   Substance and Sexual Activity    Alcohol use: No    Drug use: No    Sexual activity: Not on file   Other Topics Concern    Not on file   Social History Narrative    Not on file     Social Determinants of Health     Financial Resource Strain:     Difficulty of Paying Living Expenses: Not on file   Food Insecurity:     Worried About Running Out of Food in the Last Year: Not on file    Iker of Food in the Last Year: Not on file   Transportation Needs:     Lack of Transportation (Medical): Not on file    Lack of Transportation (Non-Medical):  Not on file   Physical Activity:     Days of Exercise per Week: Not on file    Minutes of Exercise per Session: Not on file   Stress:     Feeling of Stress : Not on file   Social Connections:     Frequency of Communication with Friends and Family: Not on file    Frequency of Social Gatherings with Friends and Family: Not on file    Attends Episcopal Services: Not on file    Active Member of Clubs or Organizations: Not on file    Attends Club or Organization Meetings: Not on file    Marital Status: Not on file   Intimate Partner Violence:     Fear of Current or Ex-Partner: Not on file    Emotionally Abused: Not on file    Physically Abused: Not on file    Sexually Abused: Not on file   Housing Stability:     Unable to Pay for Housing in the Last Year: Not on file    Number of [place] in the Last Year: Not on file    Unstable Housing in the Last Year: Not on file      FAMILY HISTORY: History reviewed. No pertinent family history. No family history on file. REVIEW OF SYSTEMS:   Negative           MENTAL STATUS EXAM & VITALS     MENTAL STATUS EXAM (MSE):      Patient is alert, oriented x name place  Speech is coherent, no flight of ideas, loosening of associations. Casually dressed and groomed  Delusional with her thought content  No Active suicidal ideations, plan or intent.   No active homicidal ideations plan or intent  No command hallucinations  Thought Processes not logical  Not seen responding to any active internal stimuli  No persecutory delusions  Insight impaired  Judgement impaired           VITALS:     Patient Vitals for the past 24 hrs:   Temp Pulse Resp BP SpO2   01/15/22 0734 97.6 °F (36.4 °C) 79 18 (!) 94/58 --   01/14/22 1939 98.1 °F (36.7 °C) 96 18 104/71 100 %     Wt Readings from Last 3 Encounters:   01/13/22 106.1 kg (234 lb)   11/28/18 106.1 kg (234 lb)   10/03/18 106.1 kg (234 lb)     Temp Readings from Last 3 Encounters:   01/15/22 97.6 °F (36.4 °C)   01/13/22 98.3 °F (36.8 °C)   02/20/19 98.6 °F (37 °C)     BP Readings from Last 3 Encounters:   01/15/22 (!) 94/58   01/13/22 98/60   02/20/19 120/90     Pulse Readings from Last 3 Encounters:   01/15/22 79   01/13/22 82   02/20/19 74            DATA     LABORATORY DATA:  Labs Reviewed   POTASSIUM     No visits with results within 2 Day(s) from this visit. Latest known visit with results is:   Admission on 01/13/2022, Discharged on 01/13/2022   Component Date Value Ref Range Status    ALCOHOL(ETHYL),SERUM 01/13/2022 <10  <10 MG/DL Final    WBC 01/13/2022 7.5  3.6 - 11.0 K/uL Final    RBC 01/13/2022 4.05  3.80 - 5.20 M/uL Final    HGB 01/13/2022 13.1  11.5 - 16.0 g/dL Final    HCT 01/13/2022 40.1  35.0 - 47.0 % Final    MCV 01/13/2022 99.0  80.0 - 99.0 FL Final    MCH 01/13/2022 32.3  26.0 - 34.0 PG Final    MCHC 01/13/2022 32.7  30.0 - 36.5 g/dL Final    RDW 01/13/2022 13.1  11.5 - 14.5 % Final    PLATELET 41/64/0721 054  150 - 400 K/uL Final    MPV 01/13/2022 10.4  8.9 - 12.9 FL Final    NRBC 01/13/2022 0.0  0  WBC Final    ABSOLUTE NRBC 01/13/2022 0.00  0.00 - 0.01 K/uL Final    NEUTROPHILS 01/13/2022 36  32 - 75 % Final    LYMPHOCYTES 01/13/2022 56* 12 - 49 % Final    MONOCYTES 01/13/2022 6  5 - 13 % Final    EOSINOPHILS 01/13/2022 1  0 - 7 % Final    BASOPHILS 01/13/2022 1  0 - 1 % Final    IMMATURE GRANULOCYTES 01/13/2022 0  0.0 - 0.5 % Final    ABS. NEUTROPHILS 01/13/2022 2.7  1.8 - 8.0 K/UL Final    ABS. LYMPHOCYTES 01/13/2022 4.2* 0.8 - 3.5 K/UL Final    ABS. MONOCYTES 01/13/2022 0.5  0.0 - 1.0 K/UL Final    ABS. EOSINOPHILS 01/13/2022 0.1  0.0 - 0.4 K/UL Final    ABS. BASOPHILS 01/13/2022 0.0  0.0 - 0.1 K/UL Final    ABS. IMM.  GRANS. 01/13/2022 0.0  0.00 - 0.04 K/UL Final    DF 01/13/2022 AUTOMATED    Final    Sodium 01/13/2022 145  136 - 145 mmol/L Final    Potassium 01/13/2022 3.3* 3.5 - 5.1 mmol/L Final    Chloride 01/13/2022 106  97 - 108 mmol/L Final    CO2 01/13/2022 29  21 - 32 mmol/L Final    Anion gap 01/13/2022 10  5 - 15 mmol/L Final    Glucose 01/13/2022 148* 65 - 100 mg/dL Final    BUN 01/13/2022 12  6 - 20 MG/DL Final    Creatinine 01/13/2022 0.80  0.55 - 1.02 MG/DL Final    BUN/Creatinine ratio 01/13/2022 15  12 - 20   Final    GFR est AA 01/13/2022 >60  >60 ml/min/1.73m2 Final    GFR est non-AA 01/13/2022 >60  >60 ml/min/1.73m2 Final    Calcium 01/13/2022 9.0  8.5 - 10.1 MG/DL Final    Bilirubin, total 01/13/2022 0.4  0.2 - 1.0 MG/DL Final    ALT (SGPT) 01/13/2022 36  12 - 78 U/L Final    AST (SGOT) 01/13/2022 24  15 - 37 U/L Final    Alk.  phosphatase 01/13/2022 89  45 - 117 U/L Final    Protein, total 01/13/2022 6.9  6.4 - 8.2 g/dL Final    Albumin 01/13/2022 3.6  3.5 - 5.0 g/dL Final    Globulin 01/13/2022 3.3  2.0 - 4.0 g/dL Final    A-G Ratio 01/13/2022 1.1  1.1 - 2.2   Final    AMPHETAMINES 01/13/2022 Negative  NEG   Final    BARBITURATES 01/13/2022 Negative  NEG   Final    BENZODIAZEPINES 01/13/2022 Negative  NEG   Final    COCAINE 01/13/2022 Negative  NEG   Final    METHADONE 01/13/2022 Negative  NEG   Final    OPIATES 01/13/2022 Negative  NEG   Final    PCP(PHENCYCLIDINE) 01/13/2022 Negative  NEG   Final    THC (TH-CANNABINOL) 01/13/2022 Negative  NEG   Final    Drug screen comment 01/13/2022 (NOTE)   Final    Color 01/13/2022 DARK YELLOW    Final    Appearance 01/13/2022 CLOUDY* CLEAR   Final    Specific gravity 01/13/2022 1.020  1.003 - 1.030   Final    pH (UA) 01/13/2022 7.0  5.0 - 8.0   Final    Protein 01/13/2022 Negative  NEG mg/dL Final    Glucose 01/13/2022 Negative  NEG mg/dL Final    Ketone 01/13/2022 Negative  NEG mg/dL Final    Bilirubin 01/13/2022 Negative  NEG   Final    Blood 01/13/2022 Negative  NEG   Final    Urobilinogen 01/13/2022 0.2  0.2 - 1.0 EU/dL Final    Nitrites 01/13/2022 Negative  NEG   Final    Leukocyte Esterase 01/13/2022 MODERATE* NEG   Final    WBC 01/13/2022 5-10  0 - 4 /hpf Final    RBC 01/13/2022 0-5  0 - 5 /hpf Final    Epithelial cells 01/13/2022 FEW  FEW /lpf Final    Bacteria 01/13/2022 Negative  NEG /hpf Final    UA:UC IF INDICATED 01/13/2022 CULTURE NOT INDICATED BY UA RESULT  CNI   Final    Valproic acid 01/13/2022 <3* 50 - 100 ug/ml Final    SARS-CoV-2 01/13/2022 Detected* NOTD   Final    Influenza A by PCR 01/13/2022 Not detected    Final    Influenza B by PCR 01/13/2022 Not detected    Final        RADIOLOGY REPORTS:  Results from [place] encounter on 05/31/18    XR ABD (KUB)    Narrative  INDICATION: constipation. EXAM: SINGLE VIEW ABDOMEN RADIOGRAPH. COMPARISON: CT abdomen 3/29/2016. FINDINGS: A supine radiograph of the abdomen shows a nonspecific bowel gas  pattern, with small amounts of gas scattered throughout small and large bowel. Retained fecal material is moderate in volume with no colonic distention  obvious. No soft tissue masses or pathologic calcifications are identified. The  bones and soft tissues are within normal limits, except for degenerative change  in both hips and the lumbosacral spine. . Surgical clips project over the right  upper quadrant, and a radiodense object in the right mid abdomen may represent  an ingested tablet. Calcifications over the pelvis are likely vascular and/or  lymphatic. .    Impression  IMPRESSION: Non-obstructive bowel gas pattern. Moderate retained fecal material  without colonic distention. .  No results found.            MEDICATIONS       ALL MEDICATIONS  Current Facility-Administered Medications   Medication Dose Route Frequency    metFORMIN (GLUCOPHAGE) tablet 500 mg  500 mg Oral BID WITH MEALS    risperiDONE (RisperDAL) tablet 1 mg  1 mg Oral BID    metroNIDAZOLE (FLAGYL) tablet 500 mg  500 mg Oral Q12H    hydrOXYzine HCL (ATARAX) tablet 50 mg  50 mg Oral TID PRN    traZODone (DESYREL) tablet 50 mg  50 mg Oral QHS PRN    acetaminophen (TYLENOL) tablet 650 mg  650 mg Oral Q4H PRN    magnesium hydroxide (MILK OF MAGNESIA) 400 mg/5 mL oral suspension 30 mL  30 mL Oral DAILY PRN    alum-mag hydroxide-simeth (MYLANTA) oral suspension 30 mL  30 mL Oral Q4H PRN      SCHEDULED MEDICATIONS  Current Facility-Administered Medications   Medication Dose Route Frequency    metFORMIN (GLUCOPHAGE) tablet 500 mg  500 mg Oral BID WITH MEALS    risperiDONE (RisperDAL) tablet 1 mg  1 mg Oral BID    metroNIDAZOLE (FLAGYL) tablet 500 mg  500 mg Oral Q12H                ASSESSMENT & PLAN        The patient, Curry Mcardle, is a 63 y.o.  female who presents at this time for treatment of the following diagnoses:  Schizoaffective disorder     The following regarding medications was addressed during rounds with patient:   the risks and benefits of the proposed medication. The patient was given the opportunity to ask questions. Informed consent given to the use of the above medications. will continue to adjust psychiatric and non-psychiatric medications  based upon diagnoses and response to treatment.   have reviewed admission and  labs . will gather additional collateral information from  family and o/p treatment team to further elucidate the nature of patient's psychopathology and baselline level of psychiatric functioning. Placed on close observation, for safety  Patient to engage in individual therapy, group therapy support group, psychoeducational group, safety planning. Patient continue to address stressors that led to her hospitalization. Patient was discussed regarding her medications, benefits risks side effects alternatives and patient agreeable in considering current medications. Patient denies any active plan of suicide or homicide today. Length of stay is 5 to 7 days.   Strengths-patient able to express self,  Weakness-limited primary support system, chronic mental health challenges, non adherence to treatment and meds    Discharge Criteria-  Patient is able to show improvement in her current neurovegetative symptoms of her underlying schizoaffective disorder and being more compliant with medications and treatment. Patient is no longer actively suicidal or homicidal and has no command hallucinations.        Current Facility-Administered Medications:     metFORMIN (GLUCOPHAGE) tablet 500 mg, 500 mg, Oral, BID WITH MEALS, Anastasiia Moreno MD, 500 mg at 01/15/22 8544    risperiDONE (RisperDAL) tablet 1 mg, 1 mg, Oral, BID, Anastasiia Moreno MD, 1 mg at 01/15/22 8960    metroNIDAZOLE (FLAGYL) tablet 500 mg, 500 mg, Oral, Q12H, Anastasiia Moreno MD, 500 mg at 01/15/22 7565    hydrOXYzine HCL (ATARAX) tablet 50 mg, 50 mg, Oral, TID PRN, Katty Whalen MD    traZODone (DESYREL) tablet 50 mg, 50 mg, Oral, QHS PRN, Katty Whalen MD    acetaminophen (TYLENOL) tablet 650 mg, 650 mg, Oral, Q4H PRN, Anastasiia Moreno MD    magnesium hydroxide (MILK OF MAGNESIA) 400 mg/5 mL oral suspension 30 mL, 30 mL, Oral, DAILY PRN, Anastasiia Moreno MD    alum-mag hydroxide-simeth (MYLANTA) oral suspension 30 mL, 30 mL, Oral, Q4H PRN, Katty Whalen MD    ESTIMATED LENGTH OF STAY:    5-7 days                    SIGNED:    Ena Mata MD  1/15/2022

## 2022-01-15 NOTE — GROUP NOTE
IP  GROUP DOCUMENTATION INDIVIDUAL                                                                          Group Therapy Note    Date: 1/15/2022    Group Start Time: 1120  Group End Time: 1200  Group Topic: Education Group - Inpatient    SRM 2  NON ACUTE    Anila Thayer    [facility] GROUP DOCUMENTATION GROUP    Group Therapy Note    Facilitated discussion focused on being aware of different feelings and their triggers and changes that need to be made to experience more comfortable feelings and less uncomfortable feelings    Attendees: 6/9         Attendance: Attended    Patient's Goal:  Attend group daily     Interventions/techniques: Informed and Supported    Follows Directions: Followed directions    Interactions: Interacted appropriately    Mental Status: Calm    Behavior/appearance: Cooperative    Goals Achieved: Able to engage in interactions, Able to listen to others, Able to self-disclose, Identified feelings and Identified triggers      Additional Notes:  Receptive to information discussed and engaged. Shared feelings she experienced prior to admission and identified a trigger.  Pt shared Diony Enrique is feeling sad and anxious today because she don't need to be here and is ready to go\"    Savannah Han

## 2022-01-15 NOTE — BH NOTES
Nursing Note    Patient is alert and oriented x 3. She denies any SI/HI/AH/VH. Patient denies any anxiety or depression. Restricted affect and isolated mood. Patient remained in her room for the majority of the shift. She complained of feeling warm but temperature stable. Patient accepted medications without difficulty. Staff will continue to monitor patient for safety.

## 2022-01-15 NOTE — GROUP NOTE
IP  GROUP DOCUMENTATION INDIVIDUAL                                                                          Group Therapy Note    Date: 1/15/2022    Group Start Time: 7333  Group End Time: 1600  Group Topic: Recreational/Music Therapy    SRM 2  NON ACUTE    Juliokrissy Hope    IP [address] [facility] GROUP DOCUMENTATION GROUP    Group Therapy Note    Facilitated leisure skills group to reinforce positive coping through music, social interaction, group activities and arts/crafts    Attendees: 5/9         Attendance: Attended    Patient's Goal:  Attend group daily     Interventions/techniques: Art integration and Supported    Follows Directions: Followed directions    Interactions: Interacted appropriately    Mental Status: Calm    Behavior/appearance: Cooperative    Goals Achieved: Able to engage in interactions and Able to listen to others      Additional Notes:   Receptive to listening to music while working on leisure task for a few minutes.  Interacted with peers and staff when prompted    Bhumi Shoulder, [address]

## 2022-01-15 NOTE — BH NOTES
DX: PSYCHOSIS    Affect restricted. Pleasant. Responses appropriate. Denied having thoughts to self harm. Per report, pt was noncompliant, with her meds,prior to admission; however, she accepted all of her 0900 scheduled meds. Asked appropriate questions. Consumed 100% of meals. Appearance disheveled; provided w/toiletries and encouraged to shower and change her gowns. Observed ambulating in hallway, with peers. Encouraged to attend groups. Q 15 mins checks maintained, for safety.

## 2022-01-15 NOTE — PROGRESS NOTES
Psychiatric Progress Note    Patient: Trinidad aVlero MRN: [id number]  SSN: [id number]    YOB: 1960  Age: 63 y.o. Sex: female      Admit Date: 1/13/2022       Subjective:     Trinidad Valero  reports feeling anxious, depressed and *** . Denies SI/HI/AH/VH. No aggression or violence. Appropriately interactive and aware. Tolerating medications well. Eating well and sleeping well. Case reviewed with nursing staff and no significant issues or events reported in the last 24 hours. Staff has observed patient interacting on the unit and attending group.     ECO, stopped medications around [place] due to sedation    Objective:     Vitals:    01/14/22 0801 01/14/22 0823 01/14/22 1939 01/15/22 0734   BP: 117/70 117/70 104/71 (!) 94/58   Pulse: 77 77 96 79   Resp: 16 16 18 18   Temp: 98.4 °F (36.9 °C) 98.4 °F (36.9 °C) 98.1 °F (36.7 °C) 97.6 °F (36.4 °C)   SpO2:   100%         Mental Status Exam:   Sensorium  Oriented to time, place, and situation   Relations Connects with interviewer and engages appropriately   Eye Contact Appropriate   Appearance:  Appropriate for venue and season   Speech:  Normal rate, tone, volume and pattern   Thought Process: Linear, logical and goal-directed   Thought Content Free of delusions, hallucinations and appears to be responding to internal stimuli   Suicidal ideations None active and contracts for safety   Mood:  Depressed and anxious, paranoid   Affect:  Flat, constricted and mood congruent   Memory    Intact   Concentration:  Intact   Insight:   Fair/poor   Judgment:  Fair/poor     No signs of tardive dyskinesia or extrapyramidal symptoms    MEDICATIONS:  Current Facility-Administered Medications   Medication Dose Route Frequency    metFORMIN (GLUCOPHAGE) tablet 500 mg  500 mg Oral BID WITH MEALS    risperiDONE (RisperDAL) tablet 1 mg  1 mg Oral BID    metroNIDAZOLE (FLAGYL) tablet 500 mg  500 mg Oral Q12H    hydrOXYzine HCL (ATARAX) tablet 50 mg  50 mg Oral TID PRN    traZODone (DESYREL) tablet 50 mg  50 mg Oral QHS PRN    acetaminophen (TYLENOL) tablet 650 mg  650 mg Oral Q4H PRN    magnesium hydroxide (MILK OF MAGNESIA) 400 mg/5 mL oral suspension 30 mL  30 mL Oral DAILY PRN    alum-mag hydroxide-simeth (MYLANTA) oral suspension 30 mL  30 mL Oral Q4H PRN        DISCUSSION:  Discussed risk and benefits of medication, provided an opportunity to answer any questions, reviewed discharge goals. Lab/Data Review:  No results found for this or any previous visit (from the past 24 hour(s)). Assessment:     Active Problems:    Psychosis ([facility] (1/13/2022)        Plan:     Continue current plan of care excepted as noted. Continue to participate in the milieu of activities and work towards discharge goals. Contracts for safety and has no immediate requests    Disposition planning with social work with the goal of a transition to an outpatient level of care. Patient would benefit from ongoing care as they have not yet reached their discharge goals are psychotropic medication optimization.     Tentative discharge TBD     Signed By: Alyssa Ocampo, PhD, PA-C, PsyCAQ    January 15, 2022

## 2022-01-16 PROCEDURE — 74011250637 HC RX REV CODE- 250/637: Performed by: PSYCHIATRY & NEUROLOGY

## 2022-01-16 PROCEDURE — 65220000003 HC RM SEMIPRIVATE PSYCH

## 2022-01-16 RX ADMIN — ACETAMINOPHEN 650 MG: 325 TABLET ORAL at 20:58

## 2022-01-16 RX ADMIN — RISPERIDONE 1 MG: 1 TABLET ORAL at 08:30

## 2022-01-16 RX ADMIN — RISPERIDONE 1 MG: 1 TABLET ORAL at 20:58

## 2022-01-16 RX ADMIN — METFORMIN HYDROCHLORIDE 500 MG: 500 TABLET ORAL at 17:35

## 2022-01-16 RX ADMIN — METRONIDAZOLE 500 MG: 500 TABLET ORAL at 20:58

## 2022-01-16 RX ADMIN — METRONIDAZOLE 500 MG: 500 TABLET ORAL at 08:30

## 2022-01-16 RX ADMIN — METFORMIN HYDROCHLORIDE 500 MG: 500 TABLET ORAL at 08:30

## 2022-01-16 NOTE — GROUP NOTE
IP  GROUP DOCUMENTATION INDIVIDUAL                                                                          Group Therapy Note    Date: 1/16/2022    Group Start Time: 5040  Group End Time: 1550  Group Topic: Recreational/Music Therapy    SRM 2  NON ACUTE    Terrell Shoulders    IP [facility] GROUP DOCUMENTATION GROUP    Group Therapy Note    Facilitated leisure skills group to reinforce positive coping through music, social interaction, group activities and arts/crafts    Attendees: 6/10         Attendance: Attended    Patient's Goal:  Attend group daily     Interventions/techniques: Art integration and Supported    Follows Directions: Followed directions    Interactions: Interacted appropriately    Mental Status: Calm    Behavior/appearance: Cooperative    Goals Achieved: Able to engage in interactions and Able to listen to others      Additional Notes:  Receptive to listening to music and songs while working on word search pzzle. Interacted with peers and staff when prompted. Left group.  Did not return     Amparo Pérez, [address]

## 2022-01-16 NOTE — GROUP NOTE
IP  GROUP DOCUMENTATION INDIVIDUAL                                                                          Group Therapy Note    Date: 1/16/2022    Group Start Time: 1115  Group End Time: 1200  Group Topic: Education Group - Inpatient    SRM 2  NON ACUTE    Renetta Stroud    [facility] GROUP DOCUMENTATION GROUP    Group Therapy Note    Facilitated discussion focused on defining different types of defense mechanisms and being able to recognize some defenses that was used to cope with stress and anxiety    Attendees: 6/10         Attendance: Attended    Patient's Goal:  Attend group daily     Interventions/techniques: Informed and Supported    Follows Directions:  Followed directions    Interactions: Interacted appropriately    Mental Status: Calm    Behavior/appearance: Cooperative and Needed prompting    Goals Achieved: Able to engage in interactions, Able to listen to others and Able to self-disclose      Additional Notes:  Receptive to information discussed and was able to recognize and  share personal example of a defense she has used such as  \"help reject complaining\"    Ismael Acosta

## 2022-01-16 NOTE — BH NOTES
Nursing Note    Patient is alert and oriented x 2. She denies any SI/HI/AH/VH. Patient denies any anxiety or depression. Restricted affect and isolated mood. Patient remained in her room for the majority of the shift. Staff will continue to monitor patient for safety.

## 2022-01-16 NOTE — BH NOTES
DX: PSYCHOSIS    Affect restricted. Pleasant. Smiling. Denied feeling depressed, anxious, and/or suicidal. Compliant with scheduled meds; however, stated, prior to taking them, \"I don't know if I need all of those. \" Pt was noncompliant, prior to admission. Consumed 100% of meals. Showered and changed her gowns, after bfkt. Encouraged to attend groups. Q 15 mins checks maintained, for safety.

## 2022-01-16 NOTE — BH NOTES
Behavioral Health Treatment Team Note     Patient goal(s) for today: \"do word searches\"   Treatment team focus/goals: Attend group and continue medication management     Progress note: The patient presented in the dayroom and appeared to be in need of some grooming. She struggled to maintain eye contact with the writer and needed prompting. She described her mood as 'alright' and presented a calm mood with a congruent affect. She denied SI/HI/AVH at this time and asked the same questions more than once. An inpatient level of care is needed at this time for medication management.       LOS:  3  Expected LOS: 5-7 Days     Insurance info/prescription coverage: Medicare   Date of last family contact:  Unknown   Family requesting physician contact today:  no  Discharge plan: stabalization on medication   Guns in the home:  no   Outpatient provider(s):  Katherin through [facility]     Participating treatment team members: Billy Fleming,   Assigned Therapist Maddy Ralph, Murray Zavala

## 2022-01-17 PROCEDURE — 65220000003 HC RM SEMIPRIVATE PSYCH

## 2022-01-17 PROCEDURE — 74011250637 HC RX REV CODE- 250/637: Performed by: PSYCHIATRY & NEUROLOGY

## 2022-01-17 RX ORDER — DIVALPROEX SODIUM 500 MG/1
500 TABLET, DELAYED RELEASE ORAL 2 TIMES DAILY
Status: DISCONTINUED | OUTPATIENT
Start: 2022-01-17 | End: 2022-01-22 | Stop reason: HOSPADM

## 2022-01-17 RX ORDER — ZIPRASIDONE HYDROCHLORIDE 20 MG/1
20 CAPSULE ORAL 2 TIMES DAILY WITH MEALS
Status: DISCONTINUED | OUTPATIENT
Start: 2022-01-18 | End: 2022-01-18

## 2022-01-17 RX ORDER — RISPERIDONE 2 MG/1
2 TABLET, FILM COATED ORAL 2 TIMES DAILY
Status: DISCONTINUED | OUTPATIENT
Start: 2022-01-17 | End: 2022-01-18

## 2022-01-17 RX ADMIN — METFORMIN HYDROCHLORIDE 500 MG: 500 TABLET ORAL at 17:17

## 2022-01-17 RX ADMIN — METFORMIN HYDROCHLORIDE 500 MG: 500 TABLET ORAL at 08:46

## 2022-01-17 RX ADMIN — RISPERIDONE 1 MG: 1 TABLET ORAL at 08:46

## 2022-01-17 NOTE — BH NOTES
Pt.is up and visible on unit, affect is flat, appetite good, appearance is neat, denies feeling suicidal,mood is labile, pt. Is cooperative, pleasant, interacts well with peers, denies hallucinations, attending groups, no other concerns voiced,remains on close observation.

## 2022-01-17 NOTE — GROUP NOTE
ROGELIO  GROUP DOCUMENTATION INDIVIDUAL                                                                          Group Therapy Note    Date: 1/17/2022    Group Start Time: 6969  Group End Time: 1400  Group Topic: Process Group - Inpatient    SRM CARE MANAGEMENT    Elisa Orozco BSW    [facility] GROUP DOCUMENTATION GROUP    Group Therapy Note      The writer provided the group with a worksheet that encouraged them to talk about goals they have for relationships with themselves and their environments. Attendees: 6/12         Attendance: Did not attend    Additional Notes: The patient was encouraged to come to group but did not attend.        IZA NolanW

## 2022-01-17 NOTE — PROGRESS NOTES
Progress Note  Date:2022       Room:[facility]  Patient Name:Glenda Nalani Curling     YOB: 1960     Age:61 y.o. Subjective    Subjective  Patient has been compliant with the medication states she does not want to take her Haldol. .  Patient continues to have delusional thought process. She is not being aggressive or agitated. Continues to have poor insight    Mental status examination-  Patient is alert oriented to name and place being in the hospital.  Speech is limited  Delusional thought processes  Not voicing any active suicidal or homicidal ideation  States to herself  Insight judgment coping remains impaired. Review of Systems  Objective         Vitals Last 24 Hours:  TEMPERATURE:  Temp  Av.6 °F (37 °C)  Min: 98.4 °F (36.9 °C)  Max: 98.7 °F (37.1 °C)  RESPIRATIONS RANGE: Resp  Av  Min: 18  Max: 18  PULSE OXIMETRY RANGE: SpO2  Av %  Min: 100 %  Max: 100 %  PULSE RANGE: Pulse  Av  Min: 83  Max: 87  BLOOD PRESSURE RANGE: Systolic (98TME), BDY:909 , Min:103 , CTA:928   ; Diastolic (48MEL), RMN:77, Min:64, Max:66    I/O (24Hr): No intake or output data in the 24 hours ending 22 1649  Objective  Labs/Imaging/Diagnostics    Labs:  CBC:No results for input(s): WBC, RBC, HGB, HCT, MCV, RDW, PLT, HGBEXT, HCTEXT, PLTEXT in the last 72 hours. CHEMISTRIES:No results for input(s): NA, K, CL, CO2, BUN, CA, PHOS, MG in the last 72 hours. No lab exists for component: CREATININE, GLUCOSEPT/INR:No results for input(s): INR, INREXT in the last 72 hours. No lab exists for component: PROTIME  APTT:No results for input(s): APTT in the last 72 hours. LIVER PROFILE:No results for input(s): AST, ALT in the last 72 hours. No lab exists for component: Ana Reenale, ALKPHOS  Lab Results   Component Value Date/Time    ALT (SGPT) 36 2022 12:30 PM    AST (SGOT) 24 2022 12:30 PM    Alk.  phosphatase 89 2022 12:30 PM    Bilirubin, total 0.4 01/13/2022 12:30 PM       Imaging Last 24 Hours:  No results found. Assessment//Plan   Active Problems:    Psychosis ([address] (1/13/2022)      Assessment & Plan  Increase the Risperdal to 2 mg p.o. twice daily  Start her home medications of Geodon 20 mg p.o. twice daily instead of Geodon 80 mg at bedtime.   Continue Depakote 500 mg p.o. twice daily  Continue to reach out to outpatient providers and support system  No side effects voiced      Current Facility-Administered Medications:     risperiDONE (RisperDAL) tablet 2 mg, 2 mg, Oral, BID, Anastasiia Moreno MD    divalproex DR (DEPAKOTE) tablet 500 mg, 500 mg, Oral, BID, Emelina Moreno MD    [START ON 1/18/2022] ziprasidone (GEODON) capsule 20 mg, 20 mg, Oral, BID WITH MEALS, Anastasiia Moreno MD    metFORMIN (GLUCOPHAGE) tablet 500 mg, 500 mg, Oral, BID WITH MEALS, Anastasiia Moreno MD, 500 mg at 01/17/22 0846    hydrOXYzine HCL (ATARAX) tablet 50 mg, 50 mg, Oral, TID PRN, Brian Espinosa MD    traZODone (DESYREL) tablet 50 mg, 50 mg, Oral, QHS PRN, Anastasiia Moreno MD    acetaminophen (TYLENOL) tablet 650 mg, 650 mg, Oral, Q4H PRN, Anastasiia Moreno MD, 650 mg at 01/16/22 2058    magnesium hydroxide (MILK OF MAGNESIA) 400 mg/5 mL oral suspension 30 mL, 30 mL, Oral, DAILY PRN, Brian Espinosa MD    alum-mag hydroxide-simeth (MYLANTA) oral suspension 30 mL, 30 mL, Oral, Q4H PRN, Brian Espinosa MD  Electronically signed by Stoney Horner MD on 1/17/2022 at 4:49 PM

## 2022-01-17 NOTE — BH NOTES
Behavioral Health Treatment Team Note     Patient goal(s) for today: 'do word searches'  Treatment team focus/goals: continue medication management, group therapy, maintain ADLS and contact collateral    Progress note: Pt presents with a flat affect and congruent mood. Pt presents as slightly disheveled, oriented x3, pleasant and cooperative. Pt denies SI/HI/AVH at this time. Pt reports that is was just a 'mess up' as to why she is here. Pt stated that she would like to go home soon and writer said that the pt will have her TDO hearing on 1/18/22. Pt still needs an inpatient level of care due to lack of insight and needing further symptom management. Writer left VM with pts , Darrel Haas, with [facility]    LOS:  4  Expected LOS: 5-7 days     Insurance info/prescription coverage:  [employer] Medicare/[facility] Medicaid   Date of last family contact:  Attempt with  today  Family requesting physician contact today:  no  Discharge plan:   Will return home with outpatient services coordinated   Guns in the home:  no   Outpatient provider(s):  [facility]    Participating treatment team members: Bayron Mcrae, * (assigned SW), Deepthi Prince MSW

## 2022-01-18 PROCEDURE — 65220000003 HC RM SEMIPRIVATE PSYCH

## 2022-01-18 PROCEDURE — 74011250637 HC RX REV CODE- 250/637: Performed by: PSYCHIATRY & NEUROLOGY

## 2022-01-18 RX ORDER — ZIPRASIDONE HYDROCHLORIDE 40 MG/1
40 CAPSULE ORAL 2 TIMES DAILY WITH MEALS
Status: DISCONTINUED | OUTPATIENT
Start: 2022-01-19 | End: 2022-01-19

## 2022-01-18 RX ADMIN — ZIPRASIDONE HYDROCHLORIDE 20 MG: 20 CAPSULE ORAL at 17:08

## 2022-01-18 RX ADMIN — DIVALPROEX SODIUM 500 MG: 500 TABLET, DELAYED RELEASE ORAL at 20:46

## 2022-01-18 RX ADMIN — METFORMIN HYDROCHLORIDE 500 MG: 500 TABLET ORAL at 17:08

## 2022-01-18 RX ADMIN — RISPERIDONE 2 MG: 2 TABLET ORAL at 20:46

## 2022-01-18 RX ADMIN — ZIPRASIDONE HYDROCHLORIDE 20 MG: 20 CAPSULE ORAL at 08:39

## 2022-01-18 NOTE — BH NOTES
Dx: PSYCHOSIS    Affect restricted. Smiles inappropriately. Denied hearing voices. Refused 0900 scheduled Metformin, Depakote, and Risperdal. Accepted Geodon. Consumed 100% of meals. Some interactions with peers. Said she showered, last evening. Encouraged to cont to attend groups. Q 15 mins checks maintained, for safety.

## 2022-01-18 NOTE — GROUP NOTE
ROGELIO  GROUP DOCUMENTATION INDIVIDUAL                                                                          Group Therapy Note    Date: 1/18/2022    Group Start Time: 3306  Group End Time: 1400  Group Topic: Process Group - Inpatient    SRM 2 BEHA [facility] ACUTE    Mirna Fletcher; Kaia Conti [facility]    Group Therapy Note    Attendees: 5/11    Writer facilitated a processing group to check in regarding how patients were feeling. Writer provided information regarding trauma and various responses. Writer provided information regarding recognizing triggers and coping skills. Writer encouraged patients to provide feedback regarding the group and encouraged pts to engage in a grounding exercise. Additional Notes:  Pt was invited and encouraged to attend group but chose not to attend.     Ana M Kidd

## 2022-01-18 NOTE — PROGRESS NOTES
Problem: Psychosis  Goal: *STG: Remains safe in hospital  Outcome: Progressing Towards Goal   Patient rounded on Q15\" for patient safety.

## 2022-01-18 NOTE — BH NOTES
Patient in bed at the beginning of the shift. Quiet. Self-isolative. Denies SI,HI and AVH's. Appears irritable but no AOB's. Attends PSR. Patient states she has anxiety and depression, but could not rate it. Appetite good. Participates in snack time. Tolerates fluids well. Ambulates with walker.

## 2022-01-18 NOTE — GROUP NOTE
IP  GROUP DOCUMENTATION INDIVIDUAL                                                                          Group Therapy Note    Date: 1/18/2022    Group Start Time: 5583  Group End Time: 6791  Group Topic: Education Group - Inpatient    SRM 2  NON ACUTE    Tahir Stnaton    IP 1150 [facility] GROUP DOCUMENTATION GROUP    Group Therapy Note    Healthy relationships/Facilitated discussion focused on breaking down our walls and  being able to recognize attitudes and behaviors that may get in the way of forming or maintaining  quality relationships    Attendees: 3/11         Attendance: Attended    Patient's Goal:  Attend group daily     Interventions/techniques: Informed and Supported    Follows Directions: Followed directions    Interactions: Interacted appropriately    Mental Status: Calm    Behavior/appearance: Cooperative and Needed prompting    Goals Achieved: Able to engage in interactions, Able to listen to others and Able to self-disclose      Additional Notes:  Receptive to information discussed and engaged.  Pt shared she recognize \" worry too much and fear of failure\" get in the way of forming or maintaining a quality relationship    Mario Delgado

## 2022-01-19 PROCEDURE — 65220000003 HC RM SEMIPRIVATE PSYCH

## 2022-01-19 PROCEDURE — 74011250637 HC RX REV CODE- 250/637: Performed by: PSYCHIATRY & NEUROLOGY

## 2022-01-19 RX ORDER — ZIPRASIDONE HYDROCHLORIDE 60 MG/1
60 CAPSULE ORAL
Status: DISCONTINUED | OUTPATIENT
Start: 2022-01-20 | End: 2022-01-22 | Stop reason: HOSPADM

## 2022-01-19 RX ORDER — HALOPERIDOL DECANOATE 50 MG/ML
50 INJECTION INTRAMUSCULAR
Status: DISCONTINUED | OUTPATIENT
Start: 2022-01-20 | End: 2022-01-22 | Stop reason: HOSPADM

## 2022-01-19 RX ADMIN — ZIPRASIDONE HYDROCHLORIDE 40 MG: 40 CAPSULE ORAL at 09:23

## 2022-01-19 RX ADMIN — ACETAMINOPHEN 650 MG: 325 TABLET ORAL at 20:25

## 2022-01-19 RX ADMIN — DIVALPROEX SODIUM 500 MG: 500 TABLET, DELAYED RELEASE ORAL at 20:21

## 2022-01-19 RX ADMIN — DIVALPROEX SODIUM 500 MG: 500 TABLET, DELAYED RELEASE ORAL at 09:23

## 2022-01-19 NOTE — PROGRESS NOTES
Problem: Psychosis  Goal: *STG: Remains safe in hospital  Outcome: Progressing Towards Goal     Problem: Psychosis  Goal: *STG: Decreased hallucinations  Outcome: Progressing Towards Goal     Problem: Breathing Pattern - Ineffective  Goal: Ability to achieve and maintain a regular respiratory rate  Outcome: Progressing Towards Goal

## 2022-01-19 NOTE — PROGRESS NOTES
Problem: Airway Clearance - Ineffective  Goal: Achieve or maintain patent airway  Outcome: Progressing Towards Goal     Problem: Falls - Risk of  Goal: *Absence of Falls  Description: Document Compa Shaikh Fall Risk and appropriate interventions in the flowsheet.   Outcome: Progressing Towards Goal  Note: Fall Risk Interventions:            Medication Interventions: Teach patient to arise slowly                   Problem: Psychosis  Goal: *STG: Remains safe in hospital  Outcome: Progressing Towards Goal  Goal: *STG/LTG: Complies with medication therapy  Outcome: Progressing Towards Goal

## 2022-01-19 NOTE — BH NOTES
Patient refused metformin. States it gives her diarrhea. Patient received medication and diet education. Accepted PO psychotropic medications. Will continue to monitor. Patient has been attending to her hygiene. Observed to interact with peers in the mileu. No paranoia, no delusions. Consumes 100% of meals. No physical complaints. Compliant with wearing a mask. Encouraged to seek support from staff as needed. Will continue to monitor on close observation to ensure patient safety and follow treatment plan as written.

## 2022-01-19 NOTE — BH NOTES
Behavioral Health Treatment Team Note     Patient goal(s) for today: \"word searches\"   Treatment team focus/goals: medication management, group therapy, commitment hearing, DC planing    Progress note: Pt's speech difficult to understand. Pt presented with drowsy affect and congruent mood as she was just napping. Pt denied SI HI and AVH. Pt had commitment hearing where she was committed for up to 10 days per [facility]. Pt presented confused with process, but still participated. Pt was appropriate and pleasant. Pt went back to sleep after hearing. LOS:  6  Expected LOS: TDO until 1/27/22    Insurance info/prescription coverage:  [place] Medicare/[facility] Medicaid   Date of last family contact:  Attempt with  today  Family requesting physician contact today:  no  Discharge plan:   Will return home with outpatient services coordinated   Guns in the home:  no   Outpatient provider(s):  [facility]       Participating treatment team members: Terri Kaye, * (assigned SW), Maciel Valdez, [facility]/ [facility], [facility]

## 2022-01-19 NOTE — PROGRESS NOTES
Progress Note  Date:2022       Room:[facility]  Patient Name:Sandra De La Fuente     YOB: 1960     Age:61 y.o. Subjective    Subjective   Patient reports the medications are making her too tired and sleepy and do not want to take all her medicines. Discussed that I can move the medications to bedtime. Patient is agreeable she is also agreeable to considering monthly decanoate injection which she has taken Haldol in the past as per her report. Mental status examination-patient mostly to herself comes out and engages in the day treatment. Denies any active SI or HI. Patient appears to have paranoia and delusional towards her medications are neighbors. Review of Systems  Objective         Vitals Last 24 Hours:  TEMPERATURE:  Temp  Av.7 °F (37.1 °C)  Min: 98.5 °F (36.9 °C)  Max: 98.9 °F (37.2 °C)  RESPIRATIONS RANGE: Resp  Av  Min: 18  Max: 18  PULSE OXIMETRY RANGE: No data recorded  PULSE RANGE: Pulse  Av  Min: 86  Max: 92  BLOOD PRESSURE RANGE: Systolic (14CFP), WXI:736 , Min:109 , CZ   ; Diastolic (74DZT), ZGA:35, Min:64, Max:74    I/O (24Hr): No intake or output data in the 24 hours ending 22 1745  Objective  Labs/Imaging/Diagnostics    Labs:  CBC:No results for input(s): WBC, RBC, HGB, HCT, MCV, RDW, PLT, HGBEXT, HCTEXT, PLTEXT in the last 72 hours. CHEMISTRIES:No results for input(s): NA, K, CL, CO2, BUN, CA, PHOS, MG in the last 72 hours. No lab exists for component: CREATININE, GLUCOSEPT/INR:No results for input(s): INR, INREXT in the last 72 hours. No lab exists for component: PROTIME  APTT:No results for input(s): APTT in the last 72 hours. LIVER PROFILE:No results for input(s): AST, ALT in the last 72 hours. No lab exists for component: SALVATORE Farnsworth  Lab Results   Component Value Date/Time    ALT (SGPT) 36 2022 12:30 PM    AST (SGOT) 24 2022 12:30 PM    Alk.  phosphatase 89 2022 12:30 PM    Bilirubin, total 0.4 01/13/2022 12:30 PM       Imaging Last 24 Hours:  No results found. Assessment//Plan   Active Problems:    Psychosis ([address] (1/13/2022)      Assessment & Plan  Change Geodon to 60 mg at bedtime and will continue to titrate  Continue Depakote  We will reorder Depakote level stat as levels is less than 3. Increased group therapy  To return to her primary psychiatrist and case management and her living situation to address stepdown planning.   Haldol Decanoate scheduled for tomorrow      Current Facility-Administered Medications:     [START ON 1/20/2022] ziprasidone hcl (GEODON) capsule 60 mg, 60 mg, Oral, QHS, Anastasiia Moreno MD    [START ON 1/20/2022] haloperidol decanoate (HALDOL DECANOATE) 50 mg/mL LA injection 50 mg, 50 mg, IntraMUSCular, Q28D, Anastasiia Moreno MD    divalproex DR (DEPAKOTE) tablet 500 mg, 500 mg, Oral, BID, Anastasiia Moreno MD, 500 mg at 01/19/22 6690    metFORMIN (GLUCOPHAGE) tablet 500 mg, 500 mg, Oral, BID WITH MEALS, Anastasiia Moreno MD, 500 mg at 01/18/22 1708    acetaminophen (TYLENOL) tablet 650 mg, 650 mg, Oral, Q4H PRN, Anastasiia Moreno MD, 650 mg at 01/16/22 2058    magnesium hydroxide (MILK OF MAGNESIA) 400 mg/5 mL oral suspension 30 mL, 30 mL, Oral, DAILY PRN, Marianna Delgado MD    alum-mag hydroxide-simeth (MYLANTA) oral suspension 30 mL, 30 mL, Oral, Q4H PRN, Marianna Delgado MD  Electronically signed by Duncan Brennan MD on 1/19/2022 at 5:45 PM

## 2022-01-19 NOTE — PROGRESS NOTES
Progress Note  Date:2022       Room:[facility]  Patient Name:Glenda Nalani Curling     YOB: 1960     Age:61 y.o. Subjective    Subjective   Patient seen this morning. Her continues to have poor insight into her need for medications. She presents delusional and paranoid relating to her neighbors. She has been refusing to take medications. Denies any SI or HI. Mental status examination-  Casually dressed engages in the group room with peers. Continues to make paranoid statements about her neighbor stealing things as well as staff members stealing her money on the outside. Impaired insight into her illness and need for medications. Denies any SI or HI. Review of Systems  Objective         Vitals Last 24 Hours:  TEMPERATURE:  Temp  Av.6 °F (37 °C)  Min: 98.4 °F (36.9 °C)  Max: 98.9 °F (37.2 °C)  RESPIRATIONS RANGE: Resp  Av  Min: 18  Max: 18  PULSE OXIMETRY RANGE: No data recorded  PULSE RANGE: Pulse  Av  Min: 86  Max: 92  BLOOD PRESSURE RANGE: Systolic (75NPC), RXL:931 , Min:109 , JNN:303   ; Diastolic (83BHO), WRV:83, Min:64, Max:68    I/O (24Hr): No intake or output data in the 24 hours ending 22  Objective  Labs/Imaging/Diagnostics    Labs:  CBC:No results for input(s): WBC, RBC, HGB, HCT, MCV, RDW, PLT, HGBEXT, HCTEXT, PLTEXT in the last 72 hours. CHEMISTRIES:No results for input(s): NA, K, CL, CO2, BUN, CA, PHOS, MG in the last 72 hours. No lab exists for component: CREATININE, GLUCOSEPT/INR:No results for input(s): INR, INREXT in the last 72 hours. No lab exists for component: PROTIME  APTT:No results for input(s): APTT in the last 72 hours. LIVER PROFILE:No results for input(s): AST, ALT in the last 72 hours. No lab exists for component: Ana Corea ALKPHOS  Lab Results   Component Value Date/Time    ALT (SGPT) 36 2022 12:30 PM    AST (SGOT) 24 2022 12:30 PM    Alk.  phosphatase 89 2022 12:30 PM    Bilirubin, total 0.4 01/13/2022 12:30 PM       Imaging Last 24 Hours:  No results found. Assessment//Plan   Active Problems:    Psychosis ([address] (1/13/2022)      Assessment & Plan  Continues to reach out her primary psychiatric provider mental health support system  Obtain baseline  Continue to assess decanoate injection and to step down to a lower level of care in the next 1 to 2 days  Patient is refused medication this morning.   Increase Geodon to 40 mg p.o. twice daily  Discontinue Risperdal  Continue metformin and Depakote  Encourage group therapy  Address safety planning      Current Facility-Administered Medications:     [START ON 1/19/2022] ziprasidone (GEODON) capsule 40 mg, 40 mg, Oral, BID WITH MEALS, Anastasiia Moreno MD    divalproex DR (DEPAKOTE) tablet 500 mg, 500 mg, Oral, BID, Anastasiia Moreno MD, 500 mg at 01/18/22 2046    metFORMIN (GLUCOPHAGE) tablet 500 mg, 500 mg, Oral, BID WITH MEALS, Anastasiia Moreno MD, 500 mg at 01/18/22 1708    hydrOXYzine HCL (ATARAX) tablet 50 mg, 50 mg, Oral, TID PRN, Marianna Delgado MD    acetaminophen (TYLENOL) tablet 650 mg, 650 mg, Oral, Q4H PRN, Anastasiia Moreno MD, 650 mg at 01/16/22 2058    magnesium hydroxide (MILK OF MAGNESIA) 400 mg/5 mL oral suspension 30 mL, 30 mL, Oral, DAILY PRN, Marianna Delgado MD    alum-mag hydroxide-simeth (MYLANTA) oral suspension 30 mL, 30 mL, Oral, Q4H PRN, Marianna Delgado MD  Electronically signed by Duncan Brennan MD on 1/18/2022 at 9:51 PM

## 2022-01-19 NOTE — GROUP NOTE
IP  GROUP DOCUMENTATION INDIVIDUAL                                                                          Group Therapy Note    Date: 1/19/2022    Group Start Time: 1115  Group End Time: 1200  Group Topic: Process Group - Inpatient    SRM 2 [facility] ACUTE    Dunia Whelan    IP 1150 [facility] GROUP DOCUMENTATION GROUP    Group Therapy Note    Facilitators provided education on the importance of identifying triggers, coping skills, and support systems to manage crisis. Patients were encouraged to share and support one another while filling out Safety Plan worksheets. Attendees: 6/11         Attendance: Attended    Patient's Goal:  To attend groups and activities. Interventions/techniques: Informed, Validated and Supported    Follows Directions: Followed directions    Interactions: Interacted appropriately    Mental Status: Flat    Behavior/appearance: Agitated, Attentive, Cooperative and Withdrawn/quiet    Goals Achieved: Able to listen to others, Able to receive feedback, Discussed safety plan, Identified triggers and Identified resources and support systems      Additional Notes:  Patient presented with sad affect but was responsive and did a good job completing her safety plan.      Seferino Su

## 2022-01-19 NOTE — GROUP NOTE
[facility] GROUP DOCUMENTATION INDIVIDUAL                                                                          Group Therapy Note    Date: 1/19/2022    Group Start Time: 6536  Group End Time: 1400  Group Topic: Process Group - Inpatient    [facility] 2 BEHA [facility] ACUTE    [facility]    IP [address] GROUP DOCUMENTATION GROUP    Group Therapy Note:  Facilitator empowered the group to identify topics most challenging for them now. The group selected the topic of \"reason\" explaining that some of our choices are skewed. Provided psycho education on the connection between thoughts, feelings, and behaviors/choice and consequence. The topic prompted further discussion about different types of relationships, boundaries, and values. Attendees: 2/11         Attendance: Did not attend    Additional Notes:  Patient was invited but did not attend group.      Sammy Forrester

## 2022-01-19 NOTE — PROGRESS NOTES
Patient was admitted for depression schizoaffective disorder, has a history of diabetes high cholesterol  Visit Vitals  /74   Pulse 86   Temp 98.5 °F (36.9 °C)   Resp 18   SpO2 100%   Labs shows UTI  Continue present treatment

## 2022-01-19 NOTE — BH NOTES
Behavioral Health Treatment Team Note     Patient goal(s) for today: 'stay positive'  Treatment team focus/goals: continue medication management, group therapy, maintain ADLS    Progress note: pt presents with broad affect and congruent mood. PT denies SI/HI/AVH at this time. Pt reports that she is feeling well and is doing better. Pt told this writer that she receives a haldol dec long acting injection and would like to receive that while she is here. Writer said they would let the doctor know. Pt has some disorganized thinking and was fixated on cleaning and that she needs to go home and clean her home. Pt is neatly groomed, socializing in day room and is oriented x4. Pt still needs an inpatient level of care for further medication management and a safe discharge plan. Writer received VM from pts PACT  Worker Joel Joseph. She reporsts that pt is not delusional at baseline. She states that pt receives long acting of haldol monthly. She also reported pt had been more verbally aggressive recently which is not like her. LOS:  6  Expected LOS: 7-10 days     Insurance info/prescription coverage:  [employer] Medicare/[facility] Medicaid   Date of last family contact:  1/19/22  Family requesting physician contact today:  no  Discharge plan:   Will return home with outpatient resources coordinated   Guns in the home:  no   Outpatient provider(s):  [facility]    Participating treatment team members: Alejandro Jeff, * (assigned SW), TAY MARQUES

## 2022-01-19 NOTE — PROGRESS NOTES
Spiritual Care Assessment/Progress Note  [facility]      NAME: Letha Kumari      MRN: [id number]  AGE: 63 y.o.  SEX: female  Sikhism Affiliation: Gnosticism   Language: English     1/19/2022     Total Time (in minutes): 5     Spiritual Assessment begun in [facility] 2 [facility] ACUTE through conversation with:         [x]Patient        [] Family    [] Friend(s)        Reason for Consult: Initial/Spiritual assessment, patient floor     Spiritual beliefs: (Please include comment if needed)     [] Identifies with a brian tradition: Gnosticism        [] Supported by a brian community:            [] Claims no spiritual orientation:           [] Seeking spiritual identity:                [] Adheres to an individual form of spirituality:           [] Not able to assess:                           Identified resources for coping:      [] Prayer                               [] Music                  [] Guided Imagery     [] Family/friends                 [] Pet visits     [] Devotional reading                         [x] Unknown     [] Other:                                              Interventions offered during this visit: (See comments for more details)    Patient Interventions: Catharsis/review of pertinent events in supportive environment,Coping skills reviewed/reinforced,Iconic (affirming the presence of God/Higher Power)           Plan of Care:     [] Support spiritual and/or cultural needs    [] Support AMD and/or advance care planning process      [] Support grieving process   [] Coordinate Rites and/or Rituals    [] Coordination with community clergy   [] No spiritual needs identified at this time   [] Detailed Plan of Care below (See Comments)  [] Make referral to Music Therapy  [] Make referral to Pet Therapy     [] Make referral to Addiction services  [] Make referral to [facility]  [] Make referral to Spiritual Care Partner  [] No future visits requested        [x] Contact Spiritual Care for further referrals     Comments:  visit for initial spiritual assessment. Patient resting, states she is not in need of pastoral care visit at this time. Invited patient to spirituality group this afternoon and she stated she does not feel she needs to attend the group at this time and politely declined and expressed gratitude for this visit and invitation. Informed her of availability of  and pastoral care services. Rev.  Elinor Trevino MDiv, [facility], [facility]   paging service: [phone])

## 2022-01-20 LAB — VALPROATE SERPL-MCNC: 64 UG/ML (ref 50–100)

## 2022-01-20 PROCEDURE — 65220000003 HC RM SEMIPRIVATE PSYCH

## 2022-01-20 PROCEDURE — 36415 COLL VENOUS BLD VENIPUNCTURE: CPT

## 2022-01-20 PROCEDURE — 80164 ASSAY DIPROPYLACETIC ACD TOT: CPT

## 2022-01-20 PROCEDURE — 74011250637 HC RX REV CODE- 250/637: Performed by: PSYCHIATRY & NEUROLOGY

## 2022-01-20 PROCEDURE — 74011250636 HC RX REV CODE- 250/636: Performed by: PSYCHIATRY & NEUROLOGY

## 2022-01-20 RX ADMIN — ZIPRASIDONE HYDROCHLORIDE 60 MG: 60 CAPSULE ORAL at 21:51

## 2022-01-20 RX ADMIN — HALOPERIDOL DECANOATE 50 MG: 50 INJECTION INTRAMUSCULAR at 09:17

## 2022-01-20 RX ADMIN — DIVALPROEX SODIUM 500 MG: 500 TABLET, DELAYED RELEASE ORAL at 08:48

## 2022-01-20 RX ADMIN — DIVALPROEX SODIUM 500 MG: 500 TABLET, DELAYED RELEASE ORAL at 21:51

## 2022-01-20 NOTE — PROGRESS NOTES
Patient was admitted for depression schizoaffective disorder, has a history of diabetes high cholesterol  Visit Vitals  /84 (BP 1 Location: Left upper arm, BP Patient Position: At rest;Sitting)   Pulse 83   Temp 98.4 °F (36.9 °C)   Resp 18   SpO2 100%   Labs shows UTI  Continue present treatment

## 2022-01-20 NOTE — PROGRESS NOTES
Problem: Airway Clearance - Ineffective  Goal: Achieve or maintain patent airway  Outcome: Progressing Towards Goal     Problem: Falls - Risk of  Goal: *Absence of Falls  Description: Document Charlene Manzanares Fall Risk and appropriate interventions in the flowsheet.   Outcome: Progressing Towards Goal  Note: Fall Risk Interventions:            Medication Interventions: Teach patient to arise slowly                   Problem: Psychosis  Goal: *STG: Remains safe in hospital  Outcome: Progressing Towards Goal  Goal: *STG/LTG: Complies with medication therapy  Outcome: Progressing Towards Goal

## 2022-01-20 NOTE — BH NOTES
Pt denies A, D, SI/HI, AVH. Pt has remained in room with eyes closed. Pt has been med compliant. Pt was concerned HS medication was going to cause her to have to use the restroom unnecessarily. Pt reassured HS medication, Depakote, was not that type of med.

## 2022-01-20 NOTE — BH NOTES
Haldol Dec 50mg administered. Left Deltoid. No physical complaints. Medication education provided and patient verbalized understanding. Will continue to monitor.

## 2022-01-20 NOTE — GROUP NOTE
[facility] GROUP DOCUMENTATION INDIVIDUAL                                                                          Group Therapy Note    Date: 1/20/2022    Group Start Time: 4338  Group End Time: 2759  Group Topic: Education Group - Inpatient    SRM 2  NON ACUTE    Tristan Perez    [facility] GROUP DOCUMENTATION GROUP    Group Therapy Note    Facilitated discussion focused on the definition and symptoms of anxiety and positive ways to manage symptoms    Attendees: 5/9         Attendance: Attended    Patient's Goal:  Attend group daily     Interventions/techniques: Informed and Supported    Follows Directions:  Followed directions    Interactions: Interacted appropriately    Mental Status: Calm    Behavior/appearance: Cooperative and Needed prompting    Goals Achieved: Able to engage in interactions, Able to listen to others, Able to self-disclose and Discussed coping      Additional Notes: Receptive to information discussed and was able to recognize different symptoms and shared a positive way to cope     Artemio Candelario, [address]

## 2022-01-20 NOTE — PROGRESS NOTES
Problem: Psychosis  Goal: *STG: Remains safe in hospital  Outcome: Progressing Towards Goal     Problem: Psychosis  Goal: *STG: Decreased hallucinations  Outcome: Progressing Towards Goal     Problem: Psychosis  Goal: *STG: Decreased delusional thinking  Outcome: Progressing Towards Goal

## 2022-01-21 PROCEDURE — 74011250637 HC RX REV CODE- 250/637: Performed by: PSYCHIATRY & NEUROLOGY

## 2022-01-21 PROCEDURE — 65220000003 HC RM SEMIPRIVATE PSYCH

## 2022-01-21 RX ORDER — ZIPRASIDONE HYDROCHLORIDE 80 MG/1
80 CAPSULE ORAL
Qty: 30 CAPSULE | Refills: 1 | Status: SHIPPED | OUTPATIENT
Start: 2022-01-21 | End: 2022-07-04

## 2022-01-21 RX ORDER — METFORMIN HYDROCHLORIDE 500 MG/1
500 TABLET ORAL 2 TIMES DAILY WITH MEALS
Qty: 60 TABLET | Refills: 0 | Status: SHIPPED | OUTPATIENT
Start: 2022-01-21 | End: 2022-07-04

## 2022-01-21 RX ORDER — DIVALPROEX SODIUM 500 MG/1
500 TABLET, DELAYED RELEASE ORAL 2 TIMES DAILY
Qty: 60 TABLET | Refills: 1 | Status: SHIPPED | OUTPATIENT
Start: 2022-01-21 | End: 2022-07-04

## 2022-01-21 RX ADMIN — ZIPRASIDONE HYDROCHLORIDE 60 MG: 60 CAPSULE ORAL at 21:09

## 2022-01-21 RX ADMIN — DIVALPROEX SODIUM 500 MG: 500 TABLET, DELAYED RELEASE ORAL at 21:09

## 2022-01-21 RX ADMIN — METFORMIN HYDROCHLORIDE 500 MG: 500 TABLET ORAL at 09:33

## 2022-01-21 RX ADMIN — DIVALPROEX SODIUM 500 MG: 500 TABLET, DELAYED RELEASE ORAL at 09:33

## 2022-01-21 RX ADMIN — METFORMIN HYDROCHLORIDE 500 MG: 500 TABLET ORAL at 16:26

## 2022-01-21 NOTE — PROGRESS NOTES
Problem: Falls - Risk of  Goal: *Absence of Falls  Description: Document Compa Beer Fall Risk and appropriate interventions in the flowsheet. Outcome: Progressing Towards Goal  Note: Fall Risk Interventions:     Medication Interventions: Teach patient to arise slowly    Problem: Psychosis  Goal: *STG: Decreased hallucinations  Outcome: Progressing Towards Goal     Problem: Psychosis  Goal: *STG: Remains safe in hospital  Outcome: Progressing Towards Goal     Patient has not fallen so far this shift. Patient has not voiced having hallucinations.

## 2022-01-21 NOTE — GROUP NOTE
[facility]  GROUP DOCUMENTATION INDIVIDUAL                                                                          Group Therapy Note    Date: 1/20/2022    Group Start Time: 9842  Group End Time: 1450  Group Topic: Process Group - Inpatient    [facility] 2 [facility] ACUTE    Xiomarabe Infante; Kimberly Middleton, [address] [facility] GROUP DOCUMENTATION GROUP    Group Therapy Note    Attendees: 3/7    Process: pts were asked to pick a date in which they read a quote out from a book. Pts then discussed importance of kindness and how it impacts us. Pts were encouraged to provide positive feedback to one another and then reflect on group. Attendance: Attended    Patient's Goal:  Attend activities andd groups     Interventions/techniques: Validated, Promoted peer support, Provide feedback and Supported    Follows Directions: Followed directions    Interactions: Interacted appropriately    Mental Status: Calm, Congruent and Flat    Behavior/appearance: Attentive, Motivated, Neatly groomed and Withdrawn/quiet    Goals Achieved: Able to engage in interactions, Able to listen to others, Able to reflect/comment on own behavior and Able to manage/cope with feelings      Additional Notes:  Pt was quiet throughout group but listened to peers. Pt said she likes to stay being positive.  Pt is making progress towards goals by attending and participating in group    [facility]

## 2022-01-21 NOTE — BH NOTES
Patient remains on close observation. Patient has been alert, oriented, cooperative and pleasant. Patient has been in bed all shift. She has has a flat affect. Her speech is slow. She said she was \"good. \"  She then said her mood was 'so . . So.\"  She denied SI or HI. She said she was sad earlier but not now. Patient was medication compliant. Continue to assess. Since going to bed, patient has been laying down quietly with her eyes closed.

## 2022-01-21 NOTE — BH NOTES
Writer called , to check on the status, of pt's ride Spoke w/male who identified himself as Cheryll Leventhal B; said the pt's ride had been cancelled by  Jose Le.

## 2022-01-21 NOTE — BH NOTES
DX: PSYCHOSIS    Affect restricted. Pt has been lying on her bed, quietly waiting for her ride, to go home. Denied a/v hallucinations. Accepted scheduled meds. Consumed 3/4 of meals, and snacks. Pt did not attend groups, today, although encouraged. Showered and changed her gowns, then dressed in her personal clothing, when she was told she was going to be d/c. Q 15 mins checks maintained, for safety.

## 2022-01-21 NOTE — BH NOTES
DISCHARGE SUMMARY    Jabari Hanna  : 1960  MRN: [id number]    The patient Bernardino Marvin exhibits the ability to control behavior in a less restrictive environment. Patient's level of functioning is improving. No assaultive/destructive behavior has been observed for the past 24 hours. No suicidal/homicidal threat or behavior has been observed for the past 24 hours. There is no evidence of serious medication side effects. Patient has not been in physical or protective restraints for at least the past 24 hours. If weapons involved, how are they secured? n/a    Is patient aware of and in agreement with discharge plan? yes    Arrangements for medication:  Prescriptions given to patient, given a weeks supply or 30 day supply. Copy of discharge instructions to provider?:  yes    Arrangements for transportation home:  Pt will take a medicaid cab home     Keep all follow up appointments as scheduled, continue to take prescribed medications per physician instructions.   Mental health crisis number:  257 or your local mental health crisis line number at [place] [address] Emergency WARM LINE      5-015-381-MHAV ([phone])      M-F: 9am to 9pm      Sat & Sun: 5pm - 9pm  National suicide prevention lines:                             3-144-MBRFSAW (3-137-090-4516)       8-534-613-TALK ([phone])    Crisis Text Line:  Text HOME to 782603

## 2022-01-21 NOTE — GROUP NOTE
[facility] GROUP DOCUMENTATION INDIVIDUAL                                                                          Group Therapy Note    Date: 1/21/2022    Group Start Time: 2991  Group End Time: 1325  Group Topic: Process Group - Inpatient    [facility] CARE MANAGEMENT    Elisa Orozco BSW    [facility] GROUP DOCUMENTATION GROUP    Group Therapy Note    The writer attempted to complete group but was unable to complete at this time, the patient's were provided with the handouts to complete at another time. Attendees: 2/7         Attendance: Did not attend    Additional Notes: The patient was encouraged to come to group but did not attend. The writer provided the patient with a handout after group.      ANYI Frankel

## 2022-01-21 NOTE — PROGRESS NOTES
63 yr old    Visit Vitals  /75   Pulse 72   Temp 98.3 °F (36.8 °C)   Resp 16   SpO2 100%     No results found for this or any previous visit (from the past 24 hour(s)).   Depression  Diabetes

## 2022-01-21 NOTE — PROGRESS NOTES
Progress Note  Date:2022       Room:[facility]  Patient Name:Sandra Benitez     YOB: 1960     Age:61 y.o. Subjective    Subjective:  Diet:  Adequate intake. Activity level: Normal.       Review of Systems   Psychiatric/Behavioral: Positive for sleep disturbance. Negative for agitation, behavioral problems, confusion, hallucinations, self-injury and suicidal ideas. paranoid, poor insight  Objective         Vitals Last 24 Hours:  TEMPERATURE:  Temp  Av.3 °F (36.8 °C)  Min: 98.3 °F (36.8 °C)  Max: 98.4 °F (36.9 °C)  RESPIRATIONS RANGE: Resp  Av.7  Min: 16  Max: 18  PULSE OXIMETRY RANGE: No data recorded  PULSE RANGE: Pulse  Av.7  Min: 83  Max: 90  BLOOD PRESSURE RANGE: Systolic (20FZV), TFS:426 , Min:116 , ETC:683   ; Diastolic (94VPA), FUK:93, Min:79, Max:84    I/O (24Hr): No intake or output data in the 24 hours ending 22  Objective  Labs/Imaging/Diagnostics    Labs:  CBC:No results for input(s): WBC, RBC, HGB, HCT, MCV, RDW, PLT, HGBEXT, HCTEXT, PLTEXT in the last 72 hours. CHEMISTRIES:No results for input(s): NA, K, CL, CO2, BUN, CA, PHOS, MG in the last 72 hours. No lab exists for component: CREATININE, GLUCOSEPT/INR:No results for input(s): INR, INREXT in the last 72 hours. No lab exists for component: PROTIME  APTT:No results for input(s): APTT in the last 72 hours. LIVER PROFILE:No results for input(s): AST, ALT in the last 72 hours. No lab exists for component: Dannial Dance, ALKPHOS  Lab Results   Component Value Date/Time    ALT (SGPT) 36 2022 12:30 PM    AST (SGOT) 24 2022 12:30 PM    Alk. phosphatase 89 2022 12:30 PM    Bilirubin, total 0.4 2022 12:30 PM       Imaging Last 24 Hours:  No results found.   Assessment//Plan   Active Problems:    Psychosis ([facility] [address] (2022)      Assessment & Plan   Patient to engage in individual therapy, group therapy support group, psychoeducational group, safety planning. Patient continue to address stressors that led to his hospitalization.   Tolerated her haldol decoanate 50 mg IM       Current Facility-Administered Medications:     ziprasidone hcl (GEODON) capsule 60 mg, 60 mg, Oral, QHS, Anastasiia Moreno MD, 60 mg at 01/20/22 2151    haloperidol decanoate (HALDOL DECANOATE) 50 mg/mL LA injection 50 mg, 50 mg, IntraMUSCular, Q28D, Anastasiia Moreno MD, 50 mg at 01/20/22 8290    divalproex DR (DEPAKOTE) tablet 500 mg, 500 mg, Oral, BID, Anastasiia Moreno MD, 500 mg at 01/20/22 2151    metFORMIN (GLUCOPHAGE) tablet 500 mg, 500 mg, Oral, BID WITH MEALS, Anastasiia Moreno MD, 500 mg at 01/18/22 1708    acetaminophen (TYLENOL) tablet 650 mg, 650 mg, Oral, Q4H PRN, Anastasiia Moreno MD, 650 mg at 01/19/22 2025    magnesium hydroxide (MILK OF MAGNESIA) 400 mg/5 mL oral suspension 30 mL, 30 mL, Oral, DAILY PRN, Anastasiia Moreno MD    alum-mag hydroxide-simeth (MYLANTA) oral suspension 30 mL, 30 mL, Oral, Q4H PRN, Deo Hutton MD  d by Cornelia Reese MD on 1/20/2022 at 10:06 PM

## 2022-01-21 NOTE — BH NOTES
Behavioral Health Transition Record to Provider    Patient Name: Karine Bonner  YOB: 1960  Medical Record Number: [id number]  Date of Admission: 1/13/2022  Date of Discharge: 1.21.22    Attending Provider: Mesha Segal MD  Discharging Provider: Dr Edu Banks  To contact this individual call [phone] and ask the  to page. If unavailable, ask to be transferred to [facility] Provider on call. [facility] Provider will be available on call 24/7 and during holidays. Primary Care Provider: Ashutosh Salcedo NP    Allergies   Allergen Reactions    Penicillins Rash       Reason for Admission: Pt was admitted for paranoia and delusions    Admission Diagnosis: Psychosis ([address] [F29]    * No surgery found *    Results for orders placed or performed during the hospital encounter of 01/13/22   VALPROIC ACID   Result Value Ref Range    Valproic acid 64 50 - 100 ug/mL       Immunizations administered during this encounter: There is no immunization history on file for this patient. Screening for Metabolic Disorders for Patients on Antipsychotic Medications  (Data obtained from the EMR)    Estimated Body Mass Index  Estimated body mass index is 40.17 kg/m² as calculated from the following:    Height as of an earlier encounter on 1/13/22: 5' 4\" (1.626 m). Weight as of an earlier encounter on 1/13/22: 106.1 kg (234 lb).      Vital Signs/Blood Pressure  Visit Vitals  /75   Pulse 72   Temp 98.3 °F (36.8 °C)   Resp 16   SpO2 100%       Blood Glucose/Hemoglobin A1c  Lab Results   Component Value Date/Time    Glucose 148 (H) 01/13/2022 12:30 PM    Glucose (POC) 87 06/10/2018 04:12 PM    Glucose (POC) 98 08/24/2017 04:22 PM       Lab Results   Component Value Date/Time    Hemoglobin A1c 6.1 05/23/2014 05:43 AM        Lipid Panel  Lab Results   Component Value Date/Time    Cholesterol, total 111 05/22/2014 05:52 AM    HDL Cholesterol 49 05/22/2014 05:52 AM    LDL, calculated 49.8 05/22/2014 05:52 AM    Triglyceride 61 05/22/2014 05:52 AM    CHOL/HDL Ratio 2.3 05/22/2014 05:52 AM        Discharge Diagnosis: psychosis    Discharge Plan: pt is returning home with follow up through [address] team    Discharge Medication List and Instructions:   Current Discharge Medication List          Unresulted Labs (24h ago, onward)            None        To obtain results of studies pending at discharge, please contact [phone]    Follow-up Information     Follow up With Specialties Details Why Contact Info    Livia Nicholson  Call Ani Delon will be calling you to follow up [phone]    Dr Nati Hassan  Schedule an appointment as soon as possible for a visit for medication management [phone]          Advanced Directive:   Does the patient have an appointed surrogate decision maker? No  Does the patient have a Medical Advance Directive? No  Does the patient have a Psychiatric Advance Directive? No  If the patient does not have a surrogate or Medical Advance Directive AND Psychiatric Advance Directive, the patient was offered information on these advance directives Patient will complete at a later time    Patient Instructions: Please continue all medications until otherwise directed by physician. Tobacco Cessation Discharge Plan:   Is the patient a smoker and needs referral for smoking cessation? Not applicable  Patient referred to the following for smoking cessation with an appointment? Not applicable     Patient was offered medication to assist with smoking cessation at discharge? Not applicable  Was education for smoking cessation added to the discharge instructions? Not applicable    Alcohol/Substance Abuse Discharge Plan:   Does the patient have a history of substance/alcohol abuse and requires a referral for treatment? Not applicable  Patient referred to the following for substance/alcohol abuse treatment with an appointment?  Not applicable  Patient was offered medication to assist with alcohol cessation at discharge? Not applicable  Was education for substance/alcohol abuse added to discharge instructions? Not applicable    Patient discharged to Home; provided to the patient/caregiver either in hard copy or electronically.

## 2022-01-22 VITALS
DIASTOLIC BLOOD PRESSURE: 85 MMHG | OXYGEN SATURATION: 100 % | RESPIRATION RATE: 20 BRPM | TEMPERATURE: 97.7 F | SYSTOLIC BLOOD PRESSURE: 121 MMHG | HEART RATE: 85 BPM

## 2022-01-22 PROCEDURE — 74011250637 HC RX REV CODE- 250/637: Performed by: PSYCHIATRY & NEUROLOGY

## 2022-01-22 RX ADMIN — DIVALPROEX SODIUM 500 MG: 500 TABLET, DELAYED RELEASE ORAL at 09:02

## 2022-01-22 NOTE — BH NOTES
Patient encouraged to get dress for her discharge, patient given her home medications, medications from [facility], valuables, personal belongings and discharge instructions, patient verbalized understanding. She left the unit ambulatory to a medicaid cab.

## 2022-01-22 NOTE — PROGRESS NOTES
Progress Note  Date:2022       Room:[facility]  Patient Name:Sandra Mcfarland     YOB: 1960     Age:61 y.o. Subjective    Subjective:  Symptoms:  Stable. Review of Systems   Psychiatric/Behavioral: Negative for agitation, dysphoric mood, hallucinations, self-injury and suicidal ideas. Objective         Vitals Last 24 Hours:  TEMPERATURE:  Temp  Av.4 °F (36.9 °C)  Min: 98.3 °F (36.8 °C)  Max: 98.6 °F (37 °C)  RESPIRATIONS RANGE: Resp  Av.7  Min: 16  Max: 18  PULSE OXIMETRY RANGE: No data recorded  PULSE RANGE: Pulse  Av.7  Min: 72  Max: 90  BLOOD PRESSURE RANGE: Systolic (32DUI), MJB:332 , Min:93 , LXN:317   ; Diastolic (69EWM), SYX:99, Min:52, Max:79    I/O (24Hr): No intake or output data in the 24 hours ending 22  Objective:  General Appearance: In no acute distress. Vital signs: (most recent): Blood pressure (!) 93/52, pulse 89, temperature 98.6 °F (37 °C), resp. rate 18, SpO2 100 %. Vital signs are normal.      Labs/Imaging/Diagnostics    Labs:  CBC:No results for input(s): WBC, RBC, HGB, HCT, MCV, RDW, PLT, HGBEXT, HCTEXT, PLTEXT in the last 72 hours. CHEMISTRIES:No results for input(s): NA, K, CL, CO2, BUN, CA, PHOS, MG in the last 72 hours. No lab exists for component: CREATININE, GLUCOSEPT/INR:No results for input(s): INR, INREXT in the last 72 hours. No lab exists for component: PROTIME  APTT:No results for input(s): APTT in the last 72 hours. LIVER PROFILE:No results for input(s): AST, ALT in the last 72 hours. No lab exists for component: Derryl Amy, ALKPHOS  Lab Results   Component Value Date/Time    ALT (SGPT) 36 2022 12:30 PM    AST (SGOT) 24 2022 12:30 PM    Alk. phosphatase 89 2022 12:30 PM    Bilirubin, total 0.4 2022 12:30 PM       Imaging Last 24 Hours:  No results found.   Assessment//Plan   Active Problems:    Psychosis ([facility] [address] (1/13/2022)      Assessment:    Condition: In stable condition. Improving.      Discharge plan for today      Current Facility-Administered Medications:     ziprasidone hcl (GEODON) capsule 60 mg, 60 mg, Oral, QHS, Anastasiia Moreno MD, 60 mg at 01/20/22 2151    haloperidol decanoate (HALDOL DECANOATE) 50 mg/mL LA injection 50 mg, 50 mg, IntraMUSCular, Q28D, Anastasiia Moreno MD, 50 mg at 01/20/22 6418    divalproex DR (DEPAKOTE) tablet 500 mg, 500 mg, Oral, BID, Anastasiia Moreno MD, 500 mg at 01/21/22 0933    metFORMIN (GLUCOPHAGE) tablet 500 mg, 500 mg, Oral, BID WITH MEALS, Anastasiia Moreno MD, 500 mg at 01/21/22 1626    acetaminophen (TYLENOL) tablet 650 mg, 650 mg, Oral, Q4H PRN, Anastsaiia Moreno MD, 650 mg at 01/19/22 2025    magnesium hydroxide (MILK OF MAGNESIA) 400 mg/5 mL oral suspension 30 mL, 30 mL, Oral, DAILY PRN, Porsche Underwood MD    alum-mag hydroxide-simeth (MYLANTA) oral suspension 30 mL, 30 mL, Oral, Q4H PRN, Porsche Underwood MD  Electronically signed by Alex Clay MD on 1/21/2022 at 8:47 PM

## 2022-01-22 NOTE — BH NOTES
Patient up for meals, isolative to her room, patient said she was going to rest while she could. Patient denied SI, HI, AH,  VH, depression and anxiety. Patient  Anticipating discharge. She remains on close observation, Q 15 minute checks.

## 2022-01-22 NOTE — PROGRESS NOTES
Problem: Falls - Risk of  Goal: *Absence of Falls  Description: Document Fer Vora Fall Risk and appropriate interventions in the flowsheet. Outcome: Progressing Towards Goal  Note: Fall Risk Interventions:     Medication Interventions: Teach patient to arise slowly    Problem: Psychosis  Goal: *STG: Decreased hallucinations  Outcome: Progressing Towards Goal     Problem: Psychosis  Goal: *STG: Remains safe in hospital  Outcome: Progressing Towards Goal     Problem: Psychosis  Goal: *STG/LTG: Complies with medication therapy  Outcome: Progressing Towards Goal     Patient has not fallen so far this shift. Patient denied any hallucinations. Patient has been safe so far this shift. Patient was medication compliant. Patient remains on close observation. Patient has been laying in bed all shift. Patient said she feels \"good. \"  She denied depression, anxiety, SI, HI or hallucinations. Patient was medication compliant. Patient offered no physical complaint. Continue to assess. Since going to bed, patient has been laying down quietly with his eyes closed.

## 2022-01-29 NOTE — DISCHARGE SUMMARY
PSYCHIATRIC DISCHARGE SUMMARY         IDENTIFICATION:    Patient Name  Juliann Severe   Date of Birth 1960   [facility] [id number]   Medical Record Number  [id number]      Age  63 y.o. PCP Mayco Talavera NP   Admit date:  1/13/2022    Discharge date: 1/22/2022   Room Number  235/02  @ [facility]   Date of Service  1/22/2022            TYPE OF DISCHARGE: REGULAR               CONDITION AT DISCHARGE: stable       PROVISIONAL & DISCHARGE DIAGNOSES:    Schizoaffective disorder       CC & HISTORY OF PRESENT ILLNESS:  CC: Delusional, TDO refusing to take medication, transferred from  [facility], COVID positive bed   HISTORY OF PRESENT ILLNESS:    Patient 29-year-old -American female with history of schizoaffective disorder, bipolar disorder and anxiety presented to the ED with reported delusions, refusing to take medications and has been continuing to worsen. Patient has been reported arguing with her neighbors, endorsing paranoia, government has been taking her packages and has been not letting her to go to [place]. Patient also had presented with delusional statements towards her physician, medications. Patient presented with police custody under [employer] to the ED.     Patient denies auditory or visual hallucinations.   Denies any active suicidal or homicidal feelings.     Past psychiatric history-history of treatment for schizoaffective disorder          SOCIAL HISTORY:    Social History     Socioeconomic History    Marital status:      Spouse name: Not on file    Number of children: Not on file    Years of education: Not on file    Highest education level: Not on file   Occupational History    Not on file   Tobacco Use    Smoking status: Never Smoker    Smokeless tobacco: Never Used   Substance and Sexual Activity    Alcohol use: No    Drug use: No    Sexual activity: Not on file   Other Topics Concern    Not on file   Social History Narrative    Not on file Social Determinants of Health     Financial Resource Strain:     Difficulty of Paying Living Expenses: Not on file   Food Insecurity:     Worried About Running Out of Food in the Last Year: Not on file    Iker of Food in the Last Year: Not on file   Transportation Needs:     Lack of Transportation (Medical): Not on file    Lack of Transportation (Non-Medical): Not on file   Physical Activity:     Days of Exercise per Week: Not on file    Minutes of Exercise per Session: Not on file   Stress:     Feeling of Stress : Not on file   Social Connections:     Frequency of Communication with Friends and Family: Not on file    Frequency of Social Gatherings with Friends and Family: Not on file    Attends Zoroastrianism Services: Not on file    Active Member of [address] [place] [address] [employer] or Organizations: Not on file    Attends Club or Organization Meetings: Not on file    Marital Status: Not on file   Intimate Partner Violence:     Fear of Current or Ex-Partner: Not on file    Emotionally Abused: Not on file    Physically Abused: Not on file    Sexually Abused: Not on file   Housing Stability:     Unable to Pay for Housing in the Last Year: Not on file    Number of [place] in the Last Year: Not on file    Unstable Housing in the Last Year: Not on file      FAMILY HISTORY:   No family history on file. HOSPITALIZATION COURSE:    Myriam Youngblood was admitted to the inpatient psychiatric unit [facility] for acute psychiatric stabilization in regards to symptomatology as described in the HPI above. While on the unit Myriam Youngblood was involved in individual, group, occupational and milieu therapy. Psychiatric medications were adjusted during this hospitalization. Myriam Youngblood demonstrated a slow, but progressive improvement in overall condition.  Much of patient's depression appeared to be related to situational stressors, effects of drugs of abuse, and psychological factors. Please see individual progress notes for more specific details regarding patient's hospitalization course. At time of discharge, Myriam Youngblood is without significant problems of depression, psychosis, kenisha. Patient free of suicidal and homicidal ideations and reports many positive predictive factors in terms of not attempting suicide or homicide. Patient with request for discharge today. There are no grounds to seek a TDO. Patient has maximized benefit to be derived from acute inpatient psychiatric treatment. All members of the treatment team concur with each other in regards to plans for discharge today per patient's request.  Patient and family are aware and in agreement with discharge and discharge plan.          LABS AND IMAGAING:    Labs Reviewed   VALPROIC ACID     Lab Results   Component Value Date/Time    Valproic acid 64 01/20/2022 06:33 AM     Admission on 01/13/2022, Discharged on 01/22/2022   Component Date Value Ref Range Status    Valproic acid 01/20/2022 64  50 - 100 ug/mL Final   Admission on 01/13/2022, Discharged on 01/13/2022   Component Date Value Ref Range Status    ALCOHOL(ETHYL),SERUM 01/13/2022 <10  <10 MG/DL Final    WBC 01/13/2022 7.5  3.6 - 11.0 K/uL Final    RBC 01/13/2022 4.05  3.80 - 5.20 M/uL Final    HGB 01/13/2022 13.1  11.5 - 16.0 g/dL Final    HCT 01/13/2022 40.1  35.0 - 47.0 % Final    MCV 01/13/2022 99.0  80.0 - 99.0 FL Final    MCH 01/13/2022 32.3  26.0 - 34.0 PG Final    MCHC 01/13/2022 32.7  30.0 - 36.5 g/dL Final    RDW 01/13/2022 13.1  11.5 - 14.5 % Final    PLATELET 79/57/5306 172  150 - 400 K/uL Final    MPV 01/13/2022 10.4  8.9 - 12.9 FL Final    NRBC 01/13/2022 0.0  0  WBC Final    ABSOLUTE NRBC 01/13/2022 0.00  0.00 - 0.01 K/uL Final    NEUTROPHILS 01/13/2022 36  32 - 75 % Final    LYMPHOCYTES 01/13/2022 56* 12 - 49 % Final    MONOCYTES 01/13/2022 6  5 - 13 % Final    EOSINOPHILS 01/13/2022 1  0 - 7 % Final    BASOPHILS 01/13/2022 1  0 - 1 % Final    IMMATURE GRANULOCYTES 01/13/2022 0  0.0 - 0.5 % Final    ABS. NEUTROPHILS 01/13/2022 2.7  1.8 - 8.0 K/UL Final    ABS. LYMPHOCYTES 01/13/2022 4.2* 0.8 - 3.5 K/UL Final    ABS. MONOCYTES 01/13/2022 0.5  0.0 - 1.0 K/UL Final    ABS. EOSINOPHILS 01/13/2022 0.1  0.0 - 0.4 K/UL Final    ABS. BASOPHILS 01/13/2022 0.0  0.0 - 0.1 K/UL Final    ABS. IMM. GRANS. 01/13/2022 0.0  0.00 - 0.04 K/UL Final    DF 01/13/2022 AUTOMATED    Final    Sodium 01/13/2022 145  136 - 145 mmol/L Final    Potassium 01/13/2022 3.3* 3.5 - 5.1 mmol/L Final    Chloride 01/13/2022 106  97 - 108 mmol/L Final    CO2 01/13/2022 29  21 - 32 mmol/L Final    Anion gap 01/13/2022 10  5 - 15 mmol/L Final    Glucose 01/13/2022 148* 65 - 100 mg/dL Final    BUN 01/13/2022 12  6 - 20 MG/DL Final    Creatinine 01/13/2022 0.80  0.55 - 1.02 MG/DL Final    BUN/Creatinine ratio 01/13/2022 15  12 - 20   Final    GFR est AA 01/13/2022 >60  >60 ml/min/1.73m2 Final    GFR est non-AA 01/13/2022 >60  >60 ml/min/1.73m2 Final    Calcium 01/13/2022 9.0  8.5 - 10.1 MG/DL Final    Bilirubin, total 01/13/2022 0.4  0.2 - 1.0 MG/DL Final    ALT (SGPT) 01/13/2022 36  12 - 78 U/L Final    AST (SGOT) 01/13/2022 24  15 - 37 U/L Final    Alk.  phosphatase 01/13/2022 89  45 - 117 U/L Final    Protein, total 01/13/2022 6.9  6.4 - 8.2 g/dL Final    Albumin 01/13/2022 3.6  3.5 - 5.0 g/dL Final    Globulin 01/13/2022 3.3  2.0 - 4.0 g/dL Final    A-G Ratio 01/13/2022 1.1  1.1 - 2.2   Final    AMPHETAMINES 01/13/2022 Negative  NEG   Final    BARBITURATES 01/13/2022 Negative  NEG   Final    BENZODIAZEPINES 01/13/2022 Negative  NEG   Final    COCAINE 01/13/2022 Negative  NEG   Final    METHADONE 01/13/2022 Negative  NEG   Final    OPIATES 01/13/2022 Negative  NEG   Final    PCP(PHENCYCLIDINE) 01/13/2022 Negative  NEG   Final    THC (TH-CANNABINOL) 01/13/2022 Negative  NEG   Final    Drug screen comment 01/13/2022 (NOTE)   Final  Color 01/13/2022 DARK YELLOW    Final    Appearance 01/13/2022 CLOUDY* CLEAR   Final    Specific gravity 01/13/2022 1.020  1.003 - 1.030   Final    pH (UA) 01/13/2022 7.0  5.0 - 8.0   Final    Protein 01/13/2022 Negative  NEG mg/dL Final    Glucose 01/13/2022 Negative  NEG mg/dL Final    Ketone 01/13/2022 Negative  NEG mg/dL Final    Bilirubin 01/13/2022 Negative  NEG   Final    Blood 01/13/2022 Negative  NEG   Final    Urobilinogen 01/13/2022 0.2  0.2 - 1.0 EU/dL Final    Nitrites 01/13/2022 Negative  NEG   Final    Leukocyte Esterase 01/13/2022 MODERATE* NEG   Final    WBC 01/13/2022 5-10  0 - 4 /hpf Final    RBC 01/13/2022 0-5  0 - 5 /hpf Final    Epithelial cells 01/13/2022 FEW  FEW /lpf Final    Bacteria 01/13/2022 Negative  NEG /hpf Final    UA:UC IF INDICATED 01/13/2022 CULTURE NOT INDICATED BY UA RESULT  CNI   Final    Valproic acid 01/13/2022 <3* 50 - 100 ug/ml Final    SARS-CoV-2 01/13/2022 Detected* NOTD   Final    Influenza A by PCR 01/13/2022 Not detected    Final    Influenza B by PCR 01/13/2022 Not detected    Final     No results found. DISPOSITION:    Home. Patient to f/u with psychiatric and psychotherapy appointments. FOLLOW-UP CARE:    Activity as tolerated  Regular Diet  Wound Care: none needed. Follow-up Information     Follow up With Specialties Details Why Shantel  22.  Call [facility] will be calling you to follow up [phone]    Dr Mckenna Lobo  Schedule an appointment as soon as possible for a visit for medication management [phone]    Kwame Jiménez NP Nurse Practitioner   [address] [place] [address],[address] [id number] [phone]      24hr discharge follow up   24hr discharge follow up no vm set up, unable to leave message                 PROGNOSIS:   liited---- based on nature of patient's pathology/ies and treatment compliance issues.   Prognosis is greatly dependent upon patient's ability to follow up with psychiatric/psychotherapy appointments as well as to comply with psychiatric medications as prescribed. DISCHARGE MEDICATIONS:    Informed consent given for the use of following psychotropic medications:  Discharge Medication List as of 1/21/2022 10:56 AM      START taking these medications    Details   divalproex DR (DEPAKOTE) 500 mg tablet Take 1 Tablet by mouth two (2) times a day., Normal, Disp-60 Tablet, R-1         CONTINUE these medications which have CHANGED    Details   metFORMIN (GLUCOPHAGE) 500 mg tablet Take 1 Tablet by mouth two (2) times daily (with meals). , Normal, Disp-60 Tablet, R-0      ziprasidone (GEODON) 80 mg capsule Take 1 Capsule by mouth nightly., Normal, Disp-30 Capsule, R-1         CONTINUE these medications which have NOT CHANGED    Details   lidocaine (LIDODERM) 5 % Apply patch to the affected area for 12 hours a day and remove for 12 hours a day., Print, Disp-6 Each, R-0      HYDROcodone-acetaminophen (NORCO) 5-325 mg per tablet Take 1 Tab by mouth every four (4) hours as needed for Pain. Max Daily Amount: 6 Tabs., Print, Disp-10 Tab, R-0      ibuprofen (MOTRIN) 800 mg tablet Take 800 mg by mouth every eight (8) hours as needed for Pain., Historical Med      lovastatin (MEVACOR) 40 mg tablet Take 40 mg by mouth nightly., Historical Med      benztropine (COGENTIN) 0.5 mg tablet Take 0.5 mg by mouth nightly., Historical Med      POTASSIUM CHLORIDE SR 10 MEQ TAB 10 mEq daily. , Historical Med         STOP taking these medications       divalproex ER (DEPAKOTE ER) 500 mg ER tablet Comments:   Reason for Stopping:         escitalopram oxalate (LEXAPRO) 20 mg tablet Comments:   Reason for Stopping:         haloperidol (HALDOL) 10 mg tablet Comments:   Reason for Stopping:                      Signed:  Italo Vidal MD

## 2022-03-18 PROBLEM — F29 PSYCHOSIS (HCC): Status: ACTIVE | Noted: 2022-01-13

## 2022-03-19 PROBLEM — R07.9 CHEST PAIN: Status: ACTIVE | Noted: 2017-10-25

## 2022-03-20 PROBLEM — K82.8 PORCELAIN GALLBLADDER: Status: ACTIVE | Noted: 2017-02-08

## 2022-04-10 RX ORDER — FENTANYL CITRATE 50 UG/ML
100 INJECTION, SOLUTION INTRAMUSCULAR; INTRAVENOUS
Status: CANCELLED | OUTPATIENT
Start: 2022-04-10 | End: 2022-04-10

## 2022-04-10 RX ORDER — FLUMAZENIL 0.1 MG/ML
0.2 INJECTION INTRAVENOUS
Status: CANCELLED | OUTPATIENT
Start: 2022-04-10 | End: 2022-04-10

## 2022-04-10 RX ORDER — NALOXONE HYDROCHLORIDE 0.4 MG/ML
0.4 INJECTION, SOLUTION INTRAMUSCULAR; INTRAVENOUS; SUBCUTANEOUS
Status: CANCELLED | OUTPATIENT
Start: 2022-04-10 | End: 2022-04-10

## 2022-04-10 RX ORDER — DEXTROMETHORPHAN/PSEUDOEPHED 2.5-7.5/.8
1.2 DROPS ORAL
Status: CANCELLED | OUTPATIENT
Start: 2022-04-10

## 2022-04-10 RX ORDER — EPINEPHRINE 0.1 MG/ML
1 INJECTION INTRACARDIAC; INTRAVENOUS
Status: CANCELLED | OUTPATIENT
Start: 2022-04-10 | End: 2022-04-11

## 2022-04-10 RX ORDER — MIDAZOLAM HYDROCHLORIDE 1 MG/ML
.25-5 INJECTION, SOLUTION INTRAMUSCULAR; INTRAVENOUS
Status: CANCELLED | OUTPATIENT
Start: 2022-04-10

## 2022-04-10 RX ORDER — ATROPINE SULFATE 0.1 MG/ML
0.5 INJECTION INTRAVENOUS
Status: CANCELLED | OUTPATIENT
Start: 2022-04-10 | End: 2022-04-11

## 2022-04-10 RX ORDER — SODIUM CHLORIDE 9 MG/ML
50 INJECTION, SOLUTION INTRAVENOUS CONTINUOUS
Status: CANCELLED | OUTPATIENT
Start: 2022-04-10 | End: 2022-04-10

## 2022-04-20 RX ORDER — HALOPERIDOL DECANOATE 100 MG/ML
INJECTION INTRAMUSCULAR
COMMUNITY

## 2022-04-20 NOTE — PERIOP NOTES
[address]  Endoscopy Preprocedure Instructions      1. On the day of your surgery, please report to registration located on the 2nd floor of the  [facility]. yes    2. You must have a responsible adult to drive you to the hospital, stay at the hospital during your procedure and drive you home. If they leave your procedure will not be started (no exceptions). yes    3. Do not have anything to eat or drink (including water, gum, mints, coffee, and juice) after midnight. This does not apply to the medications you were instructed to take by your physician. yes  If you are currently taking Plavix, Coumadin, Aspirin, or other blood-thinning agents, contact your physician for special instructions. yes,    4. If you are having a procedure that requires bowel prep: We recommend that you have only clear liquids the day before your procedure and begin your bowel prep by 5:00 pm.  You may continue to drink clear liquids until midnight. If for any reason you are not able to complete your prep please notify your physician immediately. yes    5. Have a list of all current medications, including vitamins, herbal supplements and any other over the counter medications. yes    6. If you wear glasses, contacts, dentures and/or hearing aids, they may be removed prior to procedure, please bring a case to store them in. yes    7. You should understand that if you do not follow these instructions your procedure may be cancelled. If your physical condition changes (I.e. fever, cold or flu) please contact your doctor as soon as possible. 8. It is important that you be on time.   If for any reason you are unable to keep your appointment please call [phone] the day of or your physicians office prior to your scheduled procedure

## 2022-04-27 ENCOUNTER — HOSPITAL ENCOUNTER (OUTPATIENT)
Age: 62
Setting detail: OUTPATIENT SURGERY
Discharge: HOME | End: 2022-04-27
Attending: INTERNAL MEDICINE | Admitting: INTERNAL MEDICINE

## 2022-07-06 ENCOUNTER — ANESTHESIA EVENT (OUTPATIENT)
Dept: ENDOSCOPY | Age: 62
End: 2022-07-06
Payer: MEDICARE

## 2022-07-06 ENCOUNTER — ANESTHESIA (OUTPATIENT)
Dept: ENDOSCOPY | Age: 62
End: 2022-07-06
Payer: MEDICARE

## 2022-07-06 ENCOUNTER — HOSPITAL ENCOUNTER (OUTPATIENT)
Age: 62
Setting detail: OUTPATIENT SURGERY
Discharge: HOME | End: 2022-07-06
Attending: INTERNAL MEDICINE | Admitting: INTERNAL MEDICINE
Payer: MEDICARE

## 2022-07-06 VITALS
OXYGEN SATURATION: 100 % | HEIGHT: 64 IN | DIASTOLIC BLOOD PRESSURE: 88 MMHG | TEMPERATURE: 98.3 F | SYSTOLIC BLOOD PRESSURE: 137 MMHG | RESPIRATION RATE: 26 BRPM | BODY MASS INDEX: 38.62 KG/M2 | WEIGHT: 226.19 LBS | HEART RATE: 81 BPM

## 2022-07-06 LAB
GLUCOSE BLD STRIP.AUTO-MCNC: 177 MG/DL (ref 65–117)
SERVICE CMNT-IMP: ABNORMAL

## 2022-07-06 PROCEDURE — 76040000019: Performed by: INTERNAL MEDICINE

## 2022-07-06 PROCEDURE — 82962 GLUCOSE BLOOD TEST: CPT

## 2022-07-06 PROCEDURE — 76060000031 HC ANESTHESIA FIRST 0.5 HR: Performed by: INTERNAL MEDICINE

## 2022-07-06 PROCEDURE — 74011250636 HC RX REV CODE- 250/636: Performed by: NURSE ANESTHETIST, CERTIFIED REGISTERED

## 2022-07-06 PROCEDURE — 2709999900 HC NON-CHARGEABLE SUPPLY: Performed by: INTERNAL MEDICINE

## 2022-07-06 RX ORDER — FENTANYL CITRATE 50 UG/ML
100 INJECTION, SOLUTION INTRAMUSCULAR; INTRAVENOUS
Status: DISCONTINUED | OUTPATIENT
Start: 2022-07-06 | End: 2022-07-06 | Stop reason: HOSPADM

## 2022-07-06 RX ORDER — MIDAZOLAM HYDROCHLORIDE 1 MG/ML
.25-5 INJECTION, SOLUTION INTRAMUSCULAR; INTRAVENOUS
Status: DISCONTINUED | OUTPATIENT
Start: 2022-07-06 | End: 2022-07-06 | Stop reason: HOSPADM

## 2022-07-06 RX ORDER — PROPOFOL 10 MG/ML
INJECTION, EMULSION INTRAVENOUS AS NEEDED
Status: DISCONTINUED | OUTPATIENT
Start: 2022-07-06 | End: 2022-07-06 | Stop reason: HOSPADM

## 2022-07-06 RX ORDER — PROPOFOL 10 MG/ML
INJECTION, EMULSION INTRAVENOUS
Status: DISCONTINUED | OUTPATIENT
Start: 2022-07-06 | End: 2022-07-06 | Stop reason: HOSPADM

## 2022-07-06 RX ORDER — NALOXONE HYDROCHLORIDE 0.4 MG/ML
0.4 INJECTION, SOLUTION INTRAMUSCULAR; INTRAVENOUS; SUBCUTANEOUS
Status: DISCONTINUED | OUTPATIENT
Start: 2022-07-06 | End: 2022-07-06 | Stop reason: HOSPADM

## 2022-07-06 RX ORDER — FLUMAZENIL 0.1 MG/ML
0.2 INJECTION INTRAVENOUS
Status: DISCONTINUED | OUTPATIENT
Start: 2022-07-06 | End: 2022-07-06 | Stop reason: HOSPADM

## 2022-07-06 RX ORDER — SODIUM CHLORIDE 9 MG/ML
50 INJECTION, SOLUTION INTRAVENOUS CONTINUOUS
Status: DISCONTINUED | OUTPATIENT
Start: 2022-07-06 | End: 2022-07-06 | Stop reason: HOSPADM

## 2022-07-06 RX ORDER — EPINEPHRINE 0.1 MG/ML
1 INJECTION INTRACARDIAC; INTRAVENOUS
Status: DISCONTINUED | OUTPATIENT
Start: 2022-07-06 | End: 2022-07-06 | Stop reason: HOSPADM

## 2022-07-06 RX ORDER — SODIUM CHLORIDE 9 MG/ML
INJECTION, SOLUTION INTRAVENOUS
Status: DISCONTINUED | OUTPATIENT
Start: 2022-07-06 | End: 2022-07-06 | Stop reason: HOSPADM

## 2022-07-06 RX ORDER — ATROPINE SULFATE 0.1 MG/ML
0.5 INJECTION INTRAVENOUS
Status: DISCONTINUED | OUTPATIENT
Start: 2022-07-06 | End: 2022-07-06 | Stop reason: HOSPADM

## 2022-07-06 RX ORDER — DEXTROMETHORPHAN/PSEUDOEPHED 2.5-7.5/.8
1.2 DROPS ORAL
Status: DISCONTINUED | OUTPATIENT
Start: 2022-07-06 | End: 2022-07-06 | Stop reason: HOSPADM

## 2022-07-06 RX ADMIN — PROPOFOL 140 MCG/KG/MIN: 10 INJECTION, EMULSION INTRAVENOUS at 12:46

## 2022-07-06 RX ADMIN — SODIUM CHLORIDE: 9 INJECTION, SOLUTION INTRAVENOUS at 11:14

## 2022-07-06 RX ADMIN — PROPOFOL 100 MG: 10 INJECTION, EMULSION INTRAVENOUS at 12:46

## 2022-07-06 NOTE — PERIOP NOTES
Bedside and Verbal shift change report given to Davis,RN (oncoming nurse) by Jennifer Haynes (offgoing nurse). Report included the following information SBAR.

## 2022-07-06 NOTE — ANESTHESIA POSTPROCEDURE EVALUATION
Procedure(s):  COLONOSCOPY. MAC    Anesthesia Post Evaluation        Patient location during evaluation: PACU  Level of consciousness: awake  Pain management: adequate  Airway patency: patent  Anesthetic complications: no  Cardiovascular status: acceptable  Respiratory status: acceptable  Hydration status: acceptable  Post anesthesia nausea and vomiting:  none      INITIAL Post-op Vital signs:   Vitals Value Taken Time   /88 07/06/22 1317   Temp     Pulse 83 07/06/22 1319   Resp 11 07/06/22 1319   SpO2 100 % 07/06/22 1320   Vitals shown include unvalidated device data.

## 2022-07-06 NOTE — DISCHARGE INSTRUCTIONS
Katherine Sadler  [id number]  1960    DISCHARGE INSTRUCTIONS    Results:  Impression:  inadequate preparation; otherwise normal exam    Recommendations:     - For colon cancer screening in this average-risk patient, colonoscopy may be repeated in 10 years. - daily combination Clearlax plus bisacodyl  Discomfort:  Redness at IV site- apply warm compress to area; if redness or soreness persist- contact your physician. There may be a slight amount of blood passed from the rectum. Gaseous discomfort - walking, belching will help relieve any discomfort. You may not operate a vehicle for 12 hours. You may not engage in an occupation involving machinery or appliances for rest of today. You may not drink alcoholic beverages for at least 12 hours. Avoid making any critical decisions for at least 24 hours. DIET:   High fiber diet. Medications:                Resume usual medications today   ACTIVITY:  You may resume your normal daily activities it is recommended that you spend the remainder of the day resting -  avoid any strenuous activity. CALL M.D. ANY SIGN OF:   Increasing pain, nausea, vomiting  Abdominal distension (swelling)  New increased bleeding (oral or rectal)  Fever (chills)  Pain in chest area  Bloody discharge from nose or mouth  Shortness of breath     Follow-up Instructions:  Call Dr. Ashly Balbuena if you have any questions or problems.           DISCHARGE SUMMARY from Nurse    The following personal items collected during your admission are returned to you:   Dental Appliance: Dental Appliances: None  Vision: Visual Aid: Glasses  Hearing Aid:    Jewelry:    Clothing:    Other Valuables:    Valuables sent to safe:

## 2022-07-06 NOTE — PROCEDURES
[address]  [phone]      2022    Colonoscopy Procedure Note  Jacqueline Toscano  :  1960  [facility] Medical Record Number: [id number]    Indications:     periumbilical abdominal pains  PCP:  Nolvia Neri MD  Anesthesia/Sedation: see nursing notes  Endoscopist:  Dr. Ethan Sheth  Assistants: None  Complications:  None  Estimate Blood Loss:  None    Permit:  The indications, risks, benefits and alternatives were reviewed with the patient or their decision maker who was provided an opportunity to ask questions and all questions were answered. The specific risks of colonoscopy with conscious sedation were reviewed, including but not limited to anesthetic complication, bleeding, adverse drug reaction, missed lesion, infection, IV site reactions, and intestinal perforation which would lead to the need for surgical repair. Alternatives to colonoscopy including radiographic imaging, observation without testing, or laboratory testing were reviewed including the limitations of those alternatives. After considering the options and having all their questions answered, the patient or their decision maker provided both verbal and written consent to proceed. Procedure in Detail:  After obtaining informed consent, positioning of the patient in the left lateral decubitus position, and conduction of a pre-procedure pause or \"time out\" the endoscope was introduced into the anus and advanced to the cecum, which was identified by the ileocecal valve and appendiceal orifice. The quality of the colonic preparation was inadequate; semi solid waste multiple levels especially transverse colon. A careful inspection was made as the colonoscope was withdrawn, findings and interventions are described below.     Appendiceal orifice photographed    Findings:   normal  no mucosal lesion appreciated  Can't exclude polyps because of limitations of preparation    Specimens:    none    Implants: none    Complications:   None; patient tolerated the procedure well. Estimated blood loss: none    Impression:  inadequate preparation; otherwise normal exam    Recommendations:     - For colon cancer screening in this average-risk patient, colonoscopy may be repeated in 10 years. - daily combination Clearlax plus bisacodyl    Thank you for entrusting me with this patient's care. Please do not hesitate to contact me with any questions or if I can be of assistance with any of your other patients' GI needs.     Signed By: Eleonora Garber MD                        July 6, 2022

## 2022-07-06 NOTE — PROGRESS NOTES
Endoscopy discharge instructions have been reviewed and given to patient. The patient verbalized understanding and acceptance of instructions. Dr. Donna Fortune discussed with patient procedure findings and next steps.

## 2022-07-06 NOTE — H&P
Patient Name: Malka Sousa  Gender: Female   (age): 1957 (63)      Referring Physician:    [facility] [place], [address] [place] [address]  [phone] (phone)  [phone] (fax)     Chief Complaint:    Multiple GI complaints     History of Present Illness:  62 y/o female presents with multiple GI complaints and issues. About 1 month ago she she developed a sensation of a tight band across her chest which progressed to pain in her back that radiated around to each sides. It was worse with coughing and sneezing. She went to [address] [place] [address] in [place]. She had CT of her chest which was normal. She was treated with muscle relaxers which did not help. It is a bit better at times but only when taking about 3 ibuprofen. Pepto Bismol helps. She has had some nausea after eating but no vomiting. Dark stools with Pepto. No rectal bleeding. She feels like she is having a stool every time she urinates but no diarrhea or constiopation. No weight loss. She has heartburn that has gotten worse, she has taken a PPI for almost 2 decades. No dysphagia. History of lupus, Sjogren's, chronic UTIs, and alpha gal. Reports history of gallstones. She has never had an EGD or colonoscopy. Family history of IBS and colon polyps in a sister. Her mother and brother had ischemic intestinal syndrome. History of esophageal cancer in her brother. CT scanning indicates abnormal liver morphology, spinal compression fracture, Deya lithiasis without evidence for cholecystitis. Blood testing is without diagnosis.     Pains over past month have entirely resolved  Past Medical History  Medical Conditions:   Parker -Gal  Arthritis  chronic UTI w sepsis  High cholesterol  Ischemic Heart Disease  Lupus /sjogren's syndrome  osteoporosis  Other Lung Issues:__________  restrictive lung disease  Shingles  Surgical Procedures:   Lung surgery- lung collapse, diaphragm repair  Dx Studies:   CT Scan, Chest- 03/2022  Medications:   ascorbic acid (vitamin C) 500 mg Take 1 capsule by mouth once a day as directed  atorvastatin 40 mg Take 1 tablet by mouth once a day as directed  cholecalciferol (vitamin D3) 125 mcg (5,000 unit) Take 1 capsule by mouth once a day as directed  estradiol 0.01% (0.1 mg/gram) Apply 1 a small amount Vaginally Three times a week as directed  mecobalamin (vitamin B12) 1,000 mcg Take 1 tablet by mouth once a day as directed  methenamine hippurate 1 gram Take 1 tablet by mouth twice a day as directed  omeprazole 40 mg Take 1 capsule by mouth once a day as directed  paroxetine HCl 20 mg Take 1 tablet by mouth once a day as directed  Symbicort 160-4.5 mcg/actuation Inhale 2 puffs by mouth four times a day as needed and as directed  Allergies:   Parker -Gal  ceftriaxone - Hives  Propoxyphene - GI Upset  Immunizations:   COVID Vaccine (refused)  Influenza vaccination (refused)  Influenza vaccination (refused)  Social History  Alcohol:   None  Tobacco:   Never smoker  Drugs:   None  Exercise:   None  Caffeine:   Daily. Family History   No history of Colon Cancer, GI Cancers  Mother: Diagnosed with intestinal ischemic syndrome;  Sister: Diagnosed with Diverticulitis, IBS, precancerous polyp of colon;  Brother: Diagnosed with Esophageal cancer, intestinal ischemic syndrome;  Review of Systems:  Cardiovascular: Presents suffers from chest pain. Denies irregular heart beat, palpitations, peripheral edema, syncope, Sweats. Constitutional: Denies fatigue, fever, loss of appetite, weight gain, weight loss. ENMT: Presents suffers from sore throat. Denies nose bleeds, hearing loss. Endocrine: Denies excessive thirst, heat intolerance. Eyes: Denies loss of vision. Gastrointestinal: Presents suffers from abdominal pain, abdominal swelling, diarrhea, Bloating/gas, nausea, stomach cramps.  Denies change in bowel habits, constipation, heartburn, jaundice, rectal bleeding, vomiting, dysphagia, rectal pain, Stool incontinence, hematemesis. Genitourinary: Denies dark urine, dysuria, frequent urination, hematuria, incontinence. Hematologic/Lymphatic: Denies easy bruising, prolonged bleeding. Integumentary: Denies itching, rashes, sun sensitivity. Musculoskeletal: Presents suffers from arthritis, back pain. Denies gout, joint pain, muscle weakness, stiffness. Neurological: Denies dizziness, fainting, frequent headaches, memory loss. Psychiatric: Presents suffers from difficulty sleeping. Denies anxiety, depression, hallucinations, nervousness, panic attacks, paranoia. Respiratory: Presents suffers from dyspnea. Denies cough, wheezing. Vital Signs:   See nursing notes  Physical Exam:  Constitutional:  Appearance: No distress, appears comfortable. Respiratory:  Effort: Normal respiratory effort, comfortable, speaks in complete sentences. Percussion: Clear  Auscultation: Clear  Cardiac regular rate and rhythm  Abdomen no distention, focal tenderness, mass lesion    Impressions:   Upper abdominal pain  Non-cardiac chest pain  Nausea  Heartburn  GERD (gastroesophageal reflux disease)  Family history of esophageal cancer  Change in bowel habits    Plan:   I've discussed colonoscopy possible biopsy, polypectomy, cautery, injection, alternatives, complications including but not limited to pain, cardiopulmonary event, bleeding, perforation requiring additional blood transfusion or operative repair; all questions answered.

## 2022-07-06 NOTE — ANESTHESIA PREPROCEDURE EVALUATION
Anesthetic History   No history of anesthetic complications            Review of Systems / Medical History  Patient summary reviewed, nursing notes reviewed and pertinent labs reviewed    Pulmonary  Within defined limits                 Neuro/Psych         Psychiatric history     Cardiovascular  Within defined limits                     GI/Hepatic/Renal  Within defined limits              Endo/Other    Diabetes    Morbid obesity     Other Findings            Physical Exam    Airway  Mallampati: II  TM Distance: > 6 cm  Neck ROM: normal range of motion   Mouth opening: Normal     Cardiovascular  Regular rate and rhythm,  S1 and S2 normal,  no murmur, click, rub, or gallop             Dental  No notable dental hx       Pulmonary  Breath sounds clear to auscultation               Abdominal  GI exam deferred       Other Findings            Anesthetic Plan    ASA: 3  Anesthesia type: MAC          Induction: Intravenous  Anesthetic plan and risks discussed with: Patient

## 2022-07-06 NOTE — PROGRESS NOTES
Jacqueline Toscano  1960  [id number]    Situation:  Verbal report received from: Carl Paredes RN  Procedure: Procedure(s):  COLONOSCOPY    Background:    Preoperative diagnosis: SCREENING FOR COLON CANCER  Postoperative diagnosis: 1.- Normal Coloniscopy  2.- Inadequate Bowel Preparation    :  Dr. Kyle Zavala  Assistant(s): Endoscopy Technician-1: Nikolas Chris  Endoscopy RN-1: He Haq RN    Specimens: * No specimens in log *  H. Pylori  no    Assessment:  Intra-procedure medications   Anesthesia gave intra-procedure sedation and medications, see anesthesia flow sheet yes    Intravenous fluids: NS@ KVO     Vital signs stable yes    Abdominal assessment: round and soft yes    Recommendation:  Discharge patient per MD order yes.   Family or Friend caretaker  Permission to share finding with family or friend yes

## 2023-01-20 ENCOUNTER — APPOINTMENT (OUTPATIENT)
Dept: CT IMAGING | Age: 63
End: 2023-01-20
Attending: NURSE PRACTITIONER
Payer: MEDICARE

## 2023-01-20 ENCOUNTER — HOSPITAL ENCOUNTER (EMERGENCY)
Age: 63
Discharge: HOME | End: 2023-01-20
Attending: EMERGENCY MEDICINE
Payer: MEDICARE

## 2023-01-20 VITALS
RESPIRATION RATE: 18 BRPM | DIASTOLIC BLOOD PRESSURE: 97 MMHG | TEMPERATURE: 98.6 F | SYSTOLIC BLOOD PRESSURE: 137 MMHG | HEART RATE: 78 BPM | HEIGHT: 64 IN | OXYGEN SATURATION: 99 % | WEIGHT: 234 LBS | BODY MASS INDEX: 39.95 KG/M2

## 2023-01-20 DIAGNOSIS — N39.0 URINARY TRACT INFECTION WITHOUT HEMATURIA, SITE UNSPECIFIED: Primary | ICD-10-CM

## 2023-01-20 LAB
ALBUMIN SERPL-MCNC: 3.4 G/DL (ref 3.5–5)
ALBUMIN/GLOB SERPL: 1 (ref 1.1–2.2)
ALP SERPL-CCNC: 92 U/L (ref 45–117)
ALT SERPL-CCNC: 25 U/L (ref 12–78)
ANION GAP SERPL CALC-SCNC: 6 MMOL/L (ref 5–15)
APPEARANCE UR: CLEAR
AST SERPL-CCNC: 18 U/L (ref 15–37)
BACTERIA URNS QL MICRO: ABNORMAL /HPF
BASOPHILS # BLD: 0 K/UL (ref 0–0.1)
BASOPHILS NFR BLD: 1 % (ref 0–1)
BILIRUB SERPL-MCNC: 0.3 MG/DL (ref 0.2–1)
BILIRUB UR QL: NEGATIVE
BUN SERPL-MCNC: 23 MG/DL (ref 6–20)
BUN/CREAT SERPL: 28 (ref 12–20)
CALCIUM SERPL-MCNC: 9 MG/DL (ref 8.5–10.1)
CHLORIDE SERPL-SCNC: 109 MMOL/L (ref 97–108)
CO2 SERPL-SCNC: 26 MMOL/L (ref 21–32)
COLOR UR: ABNORMAL
COMMENT, HOLDF: NORMAL
CREAT SERPL-MCNC: 0.81 MG/DL (ref 0.55–1.02)
DIFFERENTIAL METHOD BLD: ABNORMAL
EOSINOPHIL # BLD: 0.1 K/UL (ref 0–0.4)
EOSINOPHIL NFR BLD: 2 % (ref 0–7)
EPITH CASTS URNS QL MICRO: ABNORMAL /LPF
ERYTHROCYTE [DISTWIDTH] IN BLOOD BY AUTOMATED COUNT: 13.3 % (ref 11.5–14.5)
GLOBULIN SER CALC-MCNC: 3.5 G/DL (ref 2–4)
GLUCOSE SERPL-MCNC: 123 MG/DL (ref 65–100)
GLUCOSE UR STRIP.AUTO-MCNC: NEGATIVE MG/DL
HCT VFR BLD AUTO: 33.3 % (ref 35–47)
HGB BLD-MCNC: 11.2 G/DL (ref 11.5–16)
HGB UR QL STRIP: NEGATIVE
HYALINE CASTS URNS QL MICRO: ABNORMAL /LPF (ref 0–2)
IMM GRANULOCYTES # BLD AUTO: 0 K/UL (ref 0–0.04)
IMM GRANULOCYTES NFR BLD AUTO: 0 % (ref 0–0.5)
KETONES UR QL STRIP.AUTO: ABNORMAL MG/DL
LEUKOCYTE ESTERASE UR QL STRIP.AUTO: ABNORMAL
LYMPHOCYTES # BLD: 2.9 K/UL (ref 0.8–3.5)
LYMPHOCYTES NFR BLD: 39 % (ref 12–49)
MCH RBC QN AUTO: 32.1 PG (ref 26–34)
MCHC RBC AUTO-ENTMCNC: 33.6 G/DL (ref 30–36.5)
MCV RBC AUTO: 95.4 FL (ref 80–99)
MONOCYTES # BLD: 0.7 K/UL (ref 0–1)
MONOCYTES NFR BLD: 9 % (ref 5–13)
NEUTS SEG # BLD: 3.7 K/UL (ref 1.8–8)
NEUTS SEG NFR BLD: 49 % (ref 32–75)
NITRITE UR QL STRIP.AUTO: NEGATIVE
NRBC # BLD: 0 K/UL (ref 0–0.01)
NRBC BLD-RTO: 0 PER 100 WBC
PH UR STRIP: 6 (ref 5–8)
PLATELET # BLD AUTO: 191 K/UL (ref 150–400)
PMV BLD AUTO: 10.9 FL (ref 8.9–12.9)
POTASSIUM SERPL-SCNC: 3.8 MMOL/L (ref 3.5–5.1)
PROT SERPL-MCNC: 6.9 G/DL (ref 6.4–8.2)
PROT UR STRIP-MCNC: NEGATIVE MG/DL
RBC # BLD AUTO: 3.49 M/UL (ref 3.8–5.2)
RBC #/AREA URNS HPF: ABNORMAL /HPF (ref 0–5)
SAMPLES BEING HELD,HOLD: NORMAL
SODIUM SERPL-SCNC: 141 MMOL/L (ref 136–145)
UR CULT HOLD, URHOLD: NORMAL
UROBILINOGEN UR QL STRIP.AUTO: 1 EU/DL (ref 0.2–1)
WBC # BLD AUTO: 7.4 K/UL (ref 3.6–11)
WBC URNS QL MICRO: ABNORMAL /HPF (ref 0–4)

## 2023-01-20 PROCEDURE — 85025 COMPLETE CBC W/AUTO DIFF WBC: CPT

## 2023-01-20 PROCEDURE — 99285 EMERGENCY DEPT VISIT HI MDM: CPT

## 2023-01-20 PROCEDURE — 36415 COLL VENOUS BLD VENIPUNCTURE: CPT

## 2023-01-20 PROCEDURE — 74177 CT ABD & PELVIS W/CONTRAST: CPT

## 2023-01-20 PROCEDURE — 80053 COMPREHEN METABOLIC PANEL: CPT

## 2023-01-20 PROCEDURE — 74011000636 HC RX REV CODE- 636: Performed by: EMERGENCY MEDICINE

## 2023-01-20 PROCEDURE — 81001 URINALYSIS AUTO W/SCOPE: CPT

## 2023-01-20 RX ORDER — CIPROFLOXACIN 500 MG/1
500 TABLET ORAL 2 TIMES DAILY
Qty: 14 TABLET | Refills: 0 | Status: SHIPPED | OUTPATIENT
Start: 2023-01-20 | End: 2023-01-27

## 2023-01-20 RX ADMIN — IOPAMIDOL 100 ML: 755 INJECTION, SOLUTION INTRAVENOUS at 15:04

## 2023-01-20 NOTE — ED TRIAGE NOTES
Pt presents to ED c/o of right sided abdominal pain radiating into the lower right flank area for 2 days. Pt denies urinary problems. Pt denies any fevers. Pt recently diagnosed for UTI 2 weeks ago. Pt prescribed abx and has not finished them yet.

## 2023-01-20 NOTE — ED PROVIDER NOTES
This is a 82-year-old female who presents to the emergency room with complaints of bilateral lower flank pain right greater than left as well as urinary urgency, frequency despite the use of antibiotics for 2 weeks. States she is continuing to take the antibiotics but her symptoms have not resolved. Denies any chest pain, shortness of breath, dizziness. No nausea or vomiting, fevers or chills. Does states she has periods of incontinence due to the urgency. There are no further complaints at this time. Carolyn Shi MD  Past Medical History:  No date: Anxiety  No date: Bipolar disorder ([facility]  No date: High cholesterol  No date: Psychiatric disorder      Comment:  Bipolar  No date: Schizoaffective disorder ([facility] [address]  No date: Type 2 diabetes mellitus ([facility] [address]      Comment:  No medication at this time  Past Surgical History:  7/6/2022: COLONOSCOPY; N/A      Comment:  COLONOSCOPY performed by Sasha Marquez MD at [facility]                ENDOSCOPY  03/03/2017: HX CHOLECYSTECTOMY      Comment:  beto Jones  No date: HX COLONOSCOPY  1991: HX TUBAL LIGATION         Past Medical History:   Diagnosis Date    Anxiety     Bipolar disorder ([facility] [address]     High cholesterol     Psychiatric disorder     Bipolar    Schizoaffective disorder ([facility] [address]     Type 2 diabetes mellitus ([facility] [address]     No medication at this time       Past Surgical History:   Procedure Laterality Date    COLONOSCOPY N/A 7/6/2022    COLONOSCOPY performed by Sasha Marquez MD at [address]  03/03/2017    beto Jones    HX COLONOSCOPY      HX TUBAL LIGATION  1991         No family history on file.     Social History     Socioeconomic History    Marital status:      Spouse name: Not on file    Number of children: Not on file    Years of education: Not on file    Highest education level: Not on file   Occupational History    Not on file   Tobacco Use    Smoking status: Never    Smokeless tobacco: Never   Vaping Use    Vaping Use: Never used   Substance and Sexual Activity    Alcohol use: No    Drug use: No    Sexual activity: Not on file   Other Topics Concern    Not on file   Social History Narrative    Not on file     Social Determinants of Health     Financial Resource Strain: Not on file   Food Insecurity: Not on file   Transportation Needs: Not on file   Physical Activity: Not on file   Stress: Not on file   Social Connections: Not on file   Intimate Partner Violence: Not on file   Housing Stability: Not on file         ALLERGIES: Penicillins    Review of Systems   Constitutional:  Negative for appetite change, chills, diaphoresis, fatigue and fever. HENT:  Negative for congestion, sore throat and trouble swallowing. Eyes:  Negative for photophobia and redness. Respiratory:  Negative for chest tightness and shortness of breath. Cardiovascular:  Negative for chest pain and palpitations. Gastrointestinal:  Negative for abdominal distention, abdominal pain, nausea and vomiting. Endocrine: Negative. Genitourinary:  Positive for flank pain, frequency and urgency. Negative for difficulty urinating. Episodic incontinence     Musculoskeletal:  Negative for back pain, neck pain and neck stiffness. Skin:  Negative for color change, pallor, rash and wound. Allergic/Immunologic: Negative. Neurological:  Negative for dizziness, speech difficulty, weakness and headaches. Hematological:  Does not bruise/bleed easily. Psychiatric/Behavioral:  Negative for behavioral problems. The patient is not nervous/anxious. Vitals:    01/20/23 1340   BP: (!) 137/97   Pulse: 78   Resp: 18   Temp: 98.6 °F (37 °C)   SpO2: 99%   Weight: 106.1 kg (234 lb)   Height: 5' 4\" (1.626 m)            Physical Exam  Vitals and nursing note reviewed. Constitutional:       General: She is not in acute distress. Appearance: Normal appearance. She is well-developed. She is not ill-appearing.    HENT:      Head: Normocephalic and atraumatic. Right Ear: External ear normal.      Left Ear: External ear normal.      Nose: Nose normal. No congestion. Mouth/Throat:      Mouth: Mucous membranes are moist.   Eyes:      General:         Right eye: No discharge. Left eye: No discharge. Conjunctiva/sclera: Conjunctivae normal.      Pupils: Pupils are equal, round, and reactive to light. Neck:      Vascular: No JVD. Trachea: No tracheal deviation. Cardiovascular:      Rate and Rhythm: Normal rate and regular rhythm. Pulses: Normal pulses. Heart sounds: Normal heart sounds. No murmur heard. No gallop. Pulmonary:      Effort: Pulmonary effort is normal. No respiratory distress. Breath sounds: Normal breath sounds. Chest:      Chest wall: No tenderness. Abdominal:      General: Bowel sounds are normal. There is no distension. Palpations: Abdomen is soft. Tenderness: There is abdominal tenderness in the right lower quadrant, suprapubic area and left lower quadrant. There is right CVA tenderness and left CVA tenderness. There is no guarding or rebound. Hernia: No hernia is present. Genitourinary:     Comments: Episodic incontinence      Musculoskeletal:         General: No tenderness. Normal range of motion. Cervical back: Normal range of motion and neck supple. Skin:     General: Skin is warm and dry. Capillary Refill: Capillary refill takes less than 2 seconds. Coloration: Skin is not pale. Findings: No erythema or rash. Neurological:      General: No focal deficit present. Mental Status: She is alert and oriented to person, place, and time. Motor: No weakness. Coordination: Coordination normal.   Psychiatric:         Mood and Affect: Mood normal.         Behavior: Behavior normal.         Thought Content:  Thought content normal.         Judgment: Judgment normal.        Medical Decision Making  Differential diagnosis includes urinary tract infection, pyelonephritis, and others. This is a 27-year-old female who presents to the emergency room with worsening urinary tract infection, flank pain and lower abdominal pain despite the use of antibiotics for approximately 2 weeks. Patient continues to take the antibiotics however symptoms persist.  After initial assessment the following was completed:    1. Previous notes (external to Emergency room) were reviewed PCP, does have a history of paranoid schizophrenia. 2.History was obtained by patient    3. Chronic medical issues affecting this visit schizophrenia, anxiety, bipolar, type 2 diabetes. 3. I ordered the following tests, interpreted them independently followed by review of final reads by lab and radiology: CBC, CMP, URINE, CT ABD/PEL    4. I considered ordering CXR    5. Any intervention/ surgery needed NONE    6. Social determinants of health MENTAL ILLNESS AND TRANSPORTATION TO HOSPITAL AND FOLLOW UP APPOINTMENTS    7 Final diagnosis UTI. Medications prescribed CIPRO    8. Disposition HOME AND FOLLOW UP WITH PCP      Problems Addressed:  Urinary tract infection without hematuria, site unspecified: acute illness or injury    Amount and/or Complexity of Data Reviewed  Labs: ordered. Radiology: ordered. Risk  Prescription drug management.          Labs Reviewed   CBC WITH AUTOMATED DIFF - Abnormal; Notable for the following components:       Result Value    RBC 3.49 (*)     HGB 11.2 (*)     HCT 33.3 (*)     All other components within normal limits   METABOLIC PANEL, COMPREHENSIVE - Abnormal; Notable for the following components:    Chloride 109 (*)     Glucose 123 (*)     BUN 23 (*)     BUN/Creatinine ratio 28 (*)     Albumin 3.4 (*)     A-G Ratio 1.0 (*)     All other components within normal limits   URINALYSIS W/ RFLX MICROSCOPIC - Abnormal; Notable for the following components:    Ketone TRACE (*)     Leukocyte Esterase SMALL (*)     Epithelial cells MANY (*) Bacteria 2+ (*)     All other components within normal limits   URINE CULTURE HOLD SAMPLE   SAMPLES BEING HELD   *UA&MICRO CHARGE BAT     CT ABD PELV W CONT    Result Date: 1/20/2023  1. Subtle wedge-shaped hypodensity at the upper pole of the RIGHT kidney which could represent infarct or infection in the appropriate clinical setting. 2. Status post cholecystectomy. 4:38 PM  Pt has been reexamined. Pt has no new complaints, changes or physical findings. Care plan outlined and precautions discussed. All available results were reviewed with pt. All medications were reviewed with pt. All of pt's questions and concerns were addressed. Pt agrees to F/U as instructed and agrees to return to ED upon further deterioration. Pt is ready to go home. Paco Adame NP    Please note that this dictation was completed with [employer], the [employer] voice recognition software. Quite often unanticipated grammatical, syntax, homophones, and other interpretive errors are inadvertently transcribed by the computer software. Please disregard these errors. Please excuse any errors that have escaped final proofreading. Thank you.       Procedures

## 2023-07-25 ENCOUNTER — HOSPITAL ENCOUNTER (INPATIENT)
Facility: HOSPITAL | Age: 63
LOS: 16 days | Discharge: HOME | DRG: 885 | End: 2023-08-10
Attending: EMERGENCY MEDICINE | Admitting: PSYCHIATRY & NEUROLOGY
Payer: MEDICARE

## 2023-07-25 DIAGNOSIS — F20.0 PARANOID SCHIZOPHRENIA (HCC): Primary | ICD-10-CM

## 2023-07-25 PROBLEM — F20.9 SCHIZOPHRENIA (HCC): Status: ACTIVE | Noted: 2023-07-25

## 2023-07-25 LAB
ALBUMIN SERPL-MCNC: 3.9 G/DL (ref 3.5–5)
ALBUMIN/GLOB SERPL: 1.1 (ref 1.1–2.2)
ALP SERPL-CCNC: 103 U/L (ref 45–117)
ALT SERPL-CCNC: 33 U/L (ref 12–78)
AMPHET UR QL SCN: NEGATIVE
ANION GAP SERPL CALC-SCNC: 12 MMOL/L (ref 5–15)
APPEARANCE UR: CLEAR
AST SERPL-CCNC: 35 U/L (ref 15–37)
BACTERIA URNS QL MICRO: NEGATIVE /HPF
BARBITURATES UR QL SCN: NEGATIVE
BASOPHILS # BLD: 0.1 K/UL (ref 0–0.1)
BASOPHILS NFR BLD: 1 % (ref 0–1)
BENZODIAZ UR QL: NEGATIVE
BILIRUB SERPL-MCNC: 1 MG/DL (ref 0.2–1)
BILIRUB UR QL: NEGATIVE
BUN SERPL-MCNC: 24 MG/DL (ref 6–20)
BUN/CREAT SERPL: 32 (ref 12–20)
CALCIUM SERPL-MCNC: 9.2 MG/DL (ref 8.5–10.1)
CANNABINOIDS UR QL SCN: NEGATIVE
CHLORIDE SERPL-SCNC: 103 MMOL/L (ref 97–108)
CO2 SERPL-SCNC: 26 MMOL/L (ref 21–32)
COCAINE UR QL SCN: POSITIVE
COLOR UR: ABNORMAL
CREAT SERPL-MCNC: 0.76 MG/DL (ref 0.55–1.02)
DIFFERENTIAL METHOD BLD: NORMAL
EOSINOPHIL # BLD: 0.1 K/UL (ref 0–0.4)
EOSINOPHIL NFR BLD: 1 % (ref 0–7)
EPITH CASTS URNS QL MICRO: ABNORMAL /LPF
ERYTHROCYTE [DISTWIDTH] IN BLOOD BY AUTOMATED COUNT: 13.2 % (ref 11.5–14.5)
ETHANOL SERPL-MCNC: <10 MG/DL (ref 0–0.08)
FLUAV RNA SPEC QL NAA+PROBE: NOT DETECTED
FLUBV RNA SPEC QL NAA+PROBE: NOT DETECTED
GLOBULIN SER CALC-MCNC: 3.5 G/DL (ref 2–4)
GLUCOSE SERPL-MCNC: 123 MG/DL (ref 65–100)
GLUCOSE UR STRIP.AUTO-MCNC: 100 MG/DL
HCT VFR BLD AUTO: 35.3 % (ref 35–47)
HGB BLD-MCNC: 11.9 G/DL (ref 11.5–16)
HGB UR QL STRIP: NEGATIVE
IMM GRANULOCYTES # BLD AUTO: 0 K/UL (ref 0–0.04)
IMM GRANULOCYTES NFR BLD AUTO: 0 % (ref 0–0.5)
KETONES UR QL STRIP.AUTO: 15 MG/DL
LEUKOCYTE ESTERASE UR QL STRIP.AUTO: ABNORMAL
LYMPHOCYTES # BLD: 3.4 K/UL (ref 0.8–3.5)
LYMPHOCYTES NFR BLD: 42 % (ref 12–49)
Lab: ABNORMAL
MCH RBC QN AUTO: 31.3 PG (ref 26–34)
MCHC RBC AUTO-ENTMCNC: 33.7 G/DL (ref 30–36.5)
MCV RBC AUTO: 92.9 FL (ref 80–99)
METHADONE UR QL: NEGATIVE
MONOCYTES # BLD: 0.7 K/UL (ref 0–1)
MONOCYTES NFR BLD: 9 % (ref 5–13)
NEUTS SEG # BLD: 3.7 K/UL (ref 1.8–8)
NEUTS SEG NFR BLD: 47 % (ref 32–75)
NITRITE UR QL STRIP.AUTO: NEGATIVE
NRBC # BLD: 0 K/UL (ref 0–0.01)
NRBC BLD-RTO: 0 PER 100 WBC
OPIATES UR QL: NEGATIVE
PCP UR QL: NEGATIVE
PH UR STRIP: 5.5 (ref 5–8)
PLATELET # BLD AUTO: 219 K/UL (ref 150–400)
PMV BLD AUTO: 11.1 FL (ref 8.9–12.9)
POTASSIUM SERPL-SCNC: 3.3 MMOL/L (ref 3.5–5.1)
PROT SERPL-MCNC: 7.4 G/DL (ref 6.4–8.2)
PROT UR STRIP-MCNC: 30 MG/DL
RBC # BLD AUTO: 3.8 M/UL (ref 3.8–5.2)
RBC #/AREA URNS HPF: ABNORMAL /HPF (ref 0–5)
SARS-COV-2 RNA RESP QL NAA+PROBE: NOT DETECTED
SODIUM SERPL-SCNC: 141 MMOL/L (ref 136–145)
SP GR UR REFRACTOMETRY: 1.03 (ref 1–1.03)
URINE CULTURE IF INDICATED: ABNORMAL
UROBILINOGEN UR QL STRIP.AUTO: 1 EU/DL (ref 0.2–1)
WBC # BLD AUTO: 8 K/UL (ref 3.6–11)
WBC URNS QL MICRO: ABNORMAL /HPF (ref 0–4)

## 2023-07-25 PROCEDURE — 80053 COMPREHEN METABOLIC PANEL: CPT

## 2023-07-25 PROCEDURE — 99285 EMERGENCY DEPT VISIT HI MDM: CPT

## 2023-07-25 PROCEDURE — 80307 DRUG TEST PRSMV CHEM ANLYZR: CPT

## 2023-07-25 PROCEDURE — 81001 URINALYSIS AUTO W/SCOPE: CPT

## 2023-07-25 PROCEDURE — 85025 COMPLETE CBC W/AUTO DIFF WBC: CPT

## 2023-07-25 PROCEDURE — 93005 ELECTROCARDIOGRAM TRACING: CPT | Performed by: EMERGENCY MEDICINE

## 2023-07-25 PROCEDURE — 1240000000 HC EMOTIONAL WELLNESS R&B

## 2023-07-25 PROCEDURE — 6370000000 HC RX 637 (ALT 250 FOR IP)

## 2023-07-25 PROCEDURE — 36415 COLL VENOUS BLD VENIPUNCTURE: CPT

## 2023-07-25 PROCEDURE — 82077 ASSAY SPEC XCP UR&BREATH IA: CPT

## 2023-07-25 PROCEDURE — 87636 SARSCOV2 & INF A&B AMP PRB: CPT

## 2023-07-25 RX ORDER — TRAZODONE HYDROCHLORIDE 50 MG/1
50 TABLET ORAL NIGHTLY PRN
Status: DISCONTINUED | OUTPATIENT
Start: 2023-07-25 | End: 2023-08-10 | Stop reason: HOSPADM

## 2023-07-25 RX ORDER — DIPHENHYDRAMINE HYDROCHLORIDE 50 MG/ML
50 INJECTION INTRAMUSCULAR; INTRAVENOUS EVERY 4 HOURS PRN
Status: DISCONTINUED | OUTPATIENT
Start: 2023-07-25 | End: 2023-08-10 | Stop reason: HOSPADM

## 2023-07-25 RX ORDER — HALOPERIDOL 5 MG/ML
5 INJECTION INTRAMUSCULAR EVERY 4 HOURS PRN
Status: DISCONTINUED | OUTPATIENT
Start: 2023-07-25 | End: 2023-08-10 | Stop reason: HOSPADM

## 2023-07-25 RX ORDER — ACETAMINOPHEN 325 MG/1
650 TABLET ORAL EVERY 4 HOURS PRN
Status: DISCONTINUED | OUTPATIENT
Start: 2023-07-25 | End: 2023-08-10 | Stop reason: HOSPADM

## 2023-07-25 RX ORDER — POLYETHYLENE GLYCOL 3350 17 G/17G
17 POWDER, FOR SOLUTION ORAL DAILY PRN
Status: DISCONTINUED | OUTPATIENT
Start: 2023-07-25 | End: 2023-08-10 | Stop reason: HOSPADM

## 2023-07-25 RX ORDER — HYDROXYZINE HYDROCHLORIDE 25 MG/1
50 TABLET, FILM COATED ORAL 3 TIMES DAILY PRN
Status: DISCONTINUED | OUTPATIENT
Start: 2023-07-25 | End: 2023-08-10 | Stop reason: HOSPADM

## 2023-07-25 RX ORDER — MAGNESIUM HYDROXIDE/ALUMINUM HYDROXICE/SIMETHICONE 120; 1200; 1200 MG/30ML; MG/30ML; MG/30ML
30 SUSPENSION ORAL EVERY 6 HOURS PRN
Status: DISCONTINUED | OUTPATIENT
Start: 2023-07-25 | End: 2023-08-10 | Stop reason: HOSPADM

## 2023-07-25 RX ORDER — HALOPERIDOL 5 MG/1
5 TABLET ORAL EVERY 4 HOURS PRN
Status: DISCONTINUED | OUTPATIENT
Start: 2023-07-25 | End: 2023-08-10 | Stop reason: HOSPADM

## 2023-07-25 RX ADMIN — HYDROXYZINE HYDROCHLORIDE 50 MG: 25 TABLET, FILM COATED ORAL at 21:28

## 2023-07-25 RX ADMIN — TRAZODONE HYDROCHLORIDE 50 MG: 50 TABLET ORAL at 21:28

## 2023-07-25 ASSESSMENT — SLEEP AND FATIGUE QUESTIONNAIRES
AVERAGE NUMBER OF SLEEP HOURS: 2
DO YOU HAVE DIFFICULTY SLEEPING: YES
SLEEP PATTERN: INSOMNIA
DO YOU USE A SLEEP AID: YES

## 2023-07-25 ASSESSMENT — LIFESTYLE VARIABLES
HOW MANY STANDARD DRINKS CONTAINING ALCOHOL DO YOU HAVE ON A TYPICAL DAY: PATIENT DOES NOT DRINK
HOW OFTEN DO YOU HAVE A DRINK CONTAINING ALCOHOL: NEVER

## 2023-07-25 NOTE — ED TRIAGE NOTES
Pt presents to ED ambulatory under an ECO with Khai RIDDLE due to someone stating that she has been off her medications and not sleeping. Pt calm and cooperative. Reports hx of paranoid schizophrenia and takes haldol injections every 28 days. Denies SI/HI.

## 2023-07-25 NOTE — ED PROVIDER NOTES
[facility] EMERGENCY DEPT  EMERGENCY DEPARTMENT ENCOUNTER       Pt Name: Ramy Lr  MRN: [id number]  [address] [facility] 1960  Date of evaluation: 7/25/2023  Provider: Riya Liu MD   PCP: Jennifer Cody MD  Note Started: 2:21 PM EDT 7/25/23     CHIEF COMPLAINT       Chief Complaint   Patient presents with    Mental Health Problem        HISTORY OF PRESENT ILLNESS: 1 or more elements      History From: patient, pac team, police, History limited by: none     Ramy Lr is a 66 y.o. female who presents in police custody for a mental health problem       Please See MDM for Additional Details of the HPI/PMH  Nursing Notes were all reviewed and agreed with or any disagreements were addressed in the HPI. REVIEW OF SYSTEMS        Positives and Pertinent negatives as per HPI. PAST HISTORY     Past Medical History:  Past Medical History:   Diagnosis Date    Anxiety     Bipolar disorder ([address] )     High cholesterol     Psychiatric disorder     Bipolar    Schizoaffective disorder ([facility])     Type 2 diabetes mellitus ([facility])     No medication at this time       Past Surgical History:  Past Surgical History:   Procedure Laterality Date    CHOLECYSTECTOMY  03/03/2017    [facility]-Dr. Anita Jose    COLONOSCOPY      COLONOSCOPY N/A 7/6/2022    COLONOSCOPY performed by Sergio Chavira MD at [address]       Family History:  No family history on file. Social History:  Social History     Tobacco Use    Smoking status: Never    Smokeless tobacco: Never   Substance Use Topics    Alcohol use: No    Drug use: No       Allergies:   Allergies   Allergen Reactions    Penicillins Rash       CURRENT MEDICATIONS      Previous Medications    HALOPERIDOL DECANOATE (HALDOL DECANOATE) 100 MG/ML INJECTION    Inject into the muscle       SCREENINGS               No data recorded         PHYSICAL EXAM      ED Triage Vitals [07/25/23 1305]   Enc Vitals Group      BP (!) 149/85      Pulse 92

## 2023-07-25 NOTE — ED NOTES
Report given to [place], [employer]. All questions welcomed and answered. Awaiting TDO paperwork at this time to send patient upstairs to room 310. Emmy Soni RN  07/25/23 1376

## 2023-07-25 NOTE — ED NOTES
TDO received. Pt taken via PD and secuirty to [address] at this time via wheelchair with 1 bag of belongings.       Arin Suero, RN  07/25/23 0236

## 2023-07-26 LAB
EKG ATRIAL RATE: 90 BPM
EKG DIAGNOSIS: NORMAL
EKG P AXIS: 53 DEGREES
EKG P-R INTERVAL: 120 MS
EKG Q-T INTERVAL: 362 MS
EKG QRS DURATION: 86 MS
EKG QTC CALCULATION (BAZETT): 442 MS
EKG R AXIS: -31 DEGREES
EKG T AXIS: 28 DEGREES
EKG VENTRICULAR RATE: 90 BPM

## 2023-07-26 PROCEDURE — 6370000000 HC RX 637 (ALT 250 FOR IP): Performed by: PSYCHIATRY & NEUROLOGY

## 2023-07-26 PROCEDURE — 93010 ELECTROCARDIOGRAM REPORT: CPT | Performed by: SPECIALIST

## 2023-07-26 PROCEDURE — 99223 1ST HOSP IP/OBS HIGH 75: CPT | Performed by: PSYCHIATRY & NEUROLOGY

## 2023-07-26 PROCEDURE — 6370000000 HC RX 637 (ALT 250 FOR IP)

## 2023-07-26 PROCEDURE — 1240000000 HC EMOTIONAL WELLNESS R&B

## 2023-07-26 RX ORDER — ATORVASTATIN CALCIUM 40 MG/1
20 TABLET, FILM COATED ORAL DAILY
Status: DISCONTINUED | OUTPATIENT
Start: 2023-07-27 | End: 2023-08-10 | Stop reason: HOSPADM

## 2023-07-26 RX ORDER — BENZTROPINE MESYLATE 1 MG/1
1 TABLET ORAL
Status: DISCONTINUED | OUTPATIENT
Start: 2023-07-27 | End: 2023-08-10 | Stop reason: HOSPADM

## 2023-07-26 RX ORDER — NITROFURANTOIN MACROCRYSTALS 50 MG/1
50 CAPSULE ORAL DAILY
COMMUNITY

## 2023-07-26 RX ORDER — OXYBUTYNIN CHLORIDE 5 MG/1
15 TABLET, EXTENDED RELEASE ORAL DAILY
Status: DISCONTINUED | OUTPATIENT
Start: 2023-07-27 | End: 2023-08-10 | Stop reason: HOSPADM

## 2023-07-26 RX ORDER — ATORVASTATIN CALCIUM 20 MG/1
20 TABLET, FILM COATED ORAL DAILY
COMMUNITY

## 2023-07-26 RX ORDER — GLIPIZIDE 5 MG/1
10 TABLET ORAL
Status: DISCONTINUED | OUTPATIENT
Start: 2023-07-27 | End: 2023-07-26

## 2023-07-26 RX ORDER — HALOPERIDOL 10 MG/1
10 TABLET ORAL
Status: ON HOLD | COMMUNITY
End: 2023-08-10 | Stop reason: HOSPADM

## 2023-07-26 RX ORDER — GLIPIZIDE 10 MG/1
10 TABLET, FILM COATED, EXTENDED RELEASE ORAL DAILY
COMMUNITY

## 2023-07-26 RX ORDER — DIVALPROEX SODIUM 500 MG/1
500 TABLET, DELAYED RELEASE ORAL
Status: ON HOLD | COMMUNITY
End: 2023-08-10 | Stop reason: HOSPADM

## 2023-07-26 RX ORDER — BENZTROPINE MESYLATE 1 MG/1
1 TABLET ORAL
COMMUNITY

## 2023-07-26 RX ORDER — OXYBUTYNIN CHLORIDE 15 MG/1
15 TABLET, EXTENDED RELEASE ORAL DAILY
COMMUNITY

## 2023-07-26 RX ORDER — GLIPIZIDE 5 MG/1
5 TABLET ORAL
Status: DISCONTINUED | OUTPATIENT
Start: 2023-07-27 | End: 2023-08-10 | Stop reason: HOSPADM

## 2023-07-26 RX ORDER — HALOPERIDOL 2 MG/ML
5 SOLUTION ORAL 2 TIMES DAILY
Status: DISCONTINUED | OUTPATIENT
Start: 2023-07-26 | End: 2023-07-27

## 2023-07-26 RX ORDER — NITROFURANTOIN MACROCRYSTALS 50 MG/1
50 CAPSULE ORAL DAILY
Status: DISCONTINUED | OUTPATIENT
Start: 2023-07-27 | End: 2023-08-10 | Stop reason: HOSPADM

## 2023-07-26 RX ADMIN — TRAZODONE HYDROCHLORIDE 50 MG: 50 TABLET ORAL at 20:40

## 2023-07-26 RX ADMIN — ACETAMINOPHEN 650 MG: 325 TABLET ORAL at 20:41

## 2023-07-26 RX ADMIN — HALOPERIDOL 5 MG: 2 SOLUTION ORAL at 20:40

## 2023-07-26 RX ADMIN — HALOPERIDOL 5 MG: 2 SOLUTION ORAL at 11:38

## 2023-07-26 RX ADMIN — HYDROXYZINE HYDROCHLORIDE 50 MG: 25 TABLET, FILM COATED ORAL at 20:41

## 2023-07-26 RX ADMIN — HALOPERIDOL 5 MG: 5 TABLET ORAL at 03:07

## 2023-07-26 RX ADMIN — ACETAMINOPHEN 650 MG: 325 TABLET ORAL at 14:09

## 2023-07-26 ASSESSMENT — PAIN DESCRIPTION - DESCRIPTORS
DESCRIPTORS: ACHING
DESCRIPTORS: ACHING

## 2023-07-26 ASSESSMENT — PAIN SCALES - WONG BAKER: WONGBAKER_NUMERICALRESPONSE: 0

## 2023-07-26 ASSESSMENT — PAIN SCALES - GENERAL
PAINLEVEL_OUTOF10: 4
PAINLEVEL_OUTOF10: 5
PAINLEVEL_OUTOF10: 0

## 2023-07-26 ASSESSMENT — PAIN - FUNCTIONAL ASSESSMENT: PAIN_FUNCTIONAL_ASSESSMENT: ACTIVITIES ARE NOT PREVENTED

## 2023-07-26 ASSESSMENT — PAIN DESCRIPTION - LOCATION
LOCATION: GENERALIZED
LOCATION: BACK

## 2023-07-26 ASSESSMENT — PAIN DESCRIPTION - ORIENTATION: ORIENTATION: LOWER

## 2023-07-26 NOTE — PLAN OF CARE
Problem: Safety - Adult  Goal: Free from fall injury  Outcome: Progressing     Pt was noted in her room sitting quietly. She presented confused, paranoid, and delusional. She was tearful at times during assessment, and making nonsensical statements. She received Atarax for anxiety, Haldol for psychosis, and Trazodone for sleep. Pt appeared to have slept approx 6 hours during the night. Monitoring for safety and behaviors continues.

## 2023-07-26 NOTE — PLAN OF CARE
Problem: Safety - Adult  Goal: Free from fall injury  Outcome: Progressing     Pt was noted in the dayroom talking loudly and laughing inappropriately. She presented confused, paranoid, and delusional. She was tearful at times during assessment, and making nonsensical statements. She was assisted with personal care after an episode of urinary incontinence. She received Atarax for anxiety, Tylenol for body aches, and Trazodone for sleep. Monitoring for safety and behaviors continues.

## 2023-07-26 NOTE — H&P
the supervision of inpatient psychiatric facility personnel. In addition, the hospital records show that services furnished were intensive treatment services, admission or related services, or equivalent services.       ESTIMATED LENGTH OF STAY:    3-5 days       STRENGTHS:  Exercising self-direction/Resourceful, Access to housing/residential stability, and Interpersonal/supportive relationships (family, friends, peers)                                        SIGNED:    Reed Donnelly MD  7/26/2023

## 2023-07-26 NOTE — PLAN OF CARE
This writer met with pt in the hallway. Pt is pleasant on approach with constricted affect. Pt presents as disorganized and lacks insight, does not know why she is in the hospital. Pt is perseverative about paying her rent and calling her /payee. Speech is pressured and nonsensical. Pt reported paranoid delusions that her sister called the police on her and they were coming to arrest her. Pt reassured that she will not be arrested while in the hospital. Pt is visible in milieu and attended morning groups. Will continue to offer support. Problem: Safety - Adult  Goal: Free from fall injury  7/26/2023 0526 by Masha Peter RN  Outcome: Progressing     Problem: Psychosis  Goal: Will report no hallucinations or delusions  Description: INTERVENTIONS:  1. Administer medication as  ordered  2. Assist with reality testing to support increasing orientation  3.  Assess if patient's hallucinations or delusions are encouraging self harm or harm to others and intervene as appropriate  Outcome: Progressing

## 2023-07-27 LAB
GLUCOSE BLD STRIP.AUTO-MCNC: 144 MG/DL (ref 65–117)
SERVICE CMNT-IMP: ABNORMAL

## 2023-07-27 PROCEDURE — 1240000000 HC EMOTIONAL WELLNESS R&B

## 2023-07-27 PROCEDURE — 82962 GLUCOSE BLOOD TEST: CPT

## 2023-07-27 PROCEDURE — 99232 SBSQ HOSP IP/OBS MODERATE 35: CPT | Performed by: PSYCHIATRY & NEUROLOGY

## 2023-07-27 PROCEDURE — 6370000000 HC RX 637 (ALT 250 FOR IP)

## 2023-07-27 PROCEDURE — 6370000000 HC RX 637 (ALT 250 FOR IP): Performed by: PSYCHIATRY & NEUROLOGY

## 2023-07-27 RX ORDER — HALOPERIDOL 2 MG/ML
7.5 SOLUTION ORAL 2 TIMES DAILY
Status: DISCONTINUED | OUTPATIENT
Start: 2023-07-27 | End: 2023-07-28

## 2023-07-27 RX ADMIN — HALOPERIDOL 7.5 MG: 2 SOLUTION ORAL at 21:43

## 2023-07-27 RX ADMIN — GLIPIZIDE 5 MG: 5 TABLET ORAL at 09:17

## 2023-07-27 RX ADMIN — TRAZODONE HYDROCHLORIDE 50 MG: 50 TABLET ORAL at 21:43

## 2023-07-27 RX ADMIN — ATORVASTATIN CALCIUM 20 MG: 40 TABLET, FILM COATED ORAL at 09:16

## 2023-07-27 RX ADMIN — HALOPERIDOL 5 MG: 2 SOLUTION ORAL at 09:17

## 2023-07-27 RX ADMIN — BENZTROPINE MESYLATE 1 MG: 1 TABLET ORAL at 21:43

## 2023-07-27 RX ADMIN — OXYBUTYNIN CHLORIDE 15 MG: 5 TABLET, EXTENDED RELEASE ORAL at 09:16

## 2023-07-27 NOTE — PLAN OF CARE
Problem: Discharge Planning  Goal: Discharge to home or other facility with appropriate resources  Outcome: Not Progressing  Flowsheets (Taken 7/27/2023 0845)  Discharge to home or other facility with appropriate resources: Identify barriers to discharge with patient and caregiver     Problem: Psychosis  Goal: Will report no hallucinations or delusions  Description: INTERVENTIONS:  1. Administer medication as  ordered  2. Assist with reality testing to support increasing orientation  3. Assess if patient's hallucinations or delusions are encouraging self harm or harm to others and intervene as appropriate  Outcome: Not Progressing     Problem: Risk for Elopement  Goal: Patient will not exit the unit/facility without proper excort  Outcome: Progressing  Flowsheets (Taken 7/27/2023 0845)  Nursing Interventions for Elopement Risk:   Communicate/escalate to charge nurse the risk of elopement   Communicate to physician the risk for elopement     Problem: Safety - Adult  Goal: Free from fall injury  Outcome: Progressing     Problem: Discharge Planning  Goal: Discharge to home or other facility with appropriate resources  Outcome: Not Progressing  Flowsheets (Taken 7/27/2023 0845)  Discharge to home or other facility with appropriate resources: Identify barriers to discharge with patient and caregiver     Problem: Psychosis  Goal: Will report no hallucinations or delusions  Description: INTERVENTIONS:  1. Administer medication as  ordered  2. Assist with reality testing to support increasing orientation  3.  Assess if patient's hallucinations or delusions are encouraging self harm or harm to others and intervene as appropriate  Outcome: Not Progressing

## 2023-07-27 NOTE — CARE COORDINATION
07/27/23 0944   ITP   Date of Plan 07/27/23   Date of Next Review 08/02/23   Primary Diagnosis Code Schizophrenia   Barriers to Treatment Need for psychoeducation   [employer] in [address] [place] [address] Term Goal (LTG) Stated in patient/guardian terms Home   Short Term Goal 1   Short Term Goal 1 Client will be oriented to program and staff, and participate in assessment process   Baseline Functioning Patient is unable to communicate with staff and treatment team   Target Patient will be an active participant in treatment team   Objectives Other (comment)  (Patient will be active participant in treatment team and discharge planning)   Intervention 1   (Treatment team will meet with patient)   Frequency Daily   Measured by Staff observation;Self report   Staff Responsible Clinical staff;[facility] staff   STG Goal 1 Status: Patient Appears to be  Progressing toward treatment plan goal   Short Term Goal 2   Short Term Goal 2 Client will learn and demonstrate effective coping skills   Baseline Functioning Patient does not have effective coping skills   Target Patient will discharge with effective coping skills   Objectives Client will participate in group therapy   Intervention 1 Group therapy; Acknowledge client strengths   Frequency Daily   Measured by Self report;Staff observation   Staff Responsible Clinical staff;[facility] staff   STG Goal 2 Status: Patient Appears to be  Progressing toward treatment plan goal   Crisis/Safety/Discharge Plan   Crisis/Safety Plan Standard program interventions and protocol   Comprehensive Assessment Completion Date 07/27/23   Discharge [address] [facility]

## 2023-07-28 LAB
CHOLEST SERPL-MCNC: 158 MG/DL
EST. AVERAGE GLUCOSE BLD GHB EST-MCNC: 146 MG/DL
HBA1C MFR BLD: 6.7 % (ref 4–5.6)
HDLC SERPL-MCNC: 41 MG/DL
HDLC SERPL: 3.9 (ref 0–5)
LDLC SERPL CALC-MCNC: 89.6 MG/DL (ref 0–100)
TRIGL SERPL-MCNC: 137 MG/DL
TSH SERPL DL<=0.05 MIU/L-ACNC: 1.22 UIU/ML (ref 0.36–3.74)
VLDLC SERPL CALC-MCNC: 27.4 MG/DL

## 2023-07-28 PROCEDURE — 36415 COLL VENOUS BLD VENIPUNCTURE: CPT

## 2023-07-28 PROCEDURE — 6370000000 HC RX 637 (ALT 250 FOR IP): Performed by: PSYCHIATRY & NEUROLOGY

## 2023-07-28 PROCEDURE — 1240000000 HC EMOTIONAL WELLNESS R&B

## 2023-07-28 PROCEDURE — 80061 LIPID PANEL: CPT

## 2023-07-28 PROCEDURE — 83036 HEMOGLOBIN GLYCOSYLATED A1C: CPT

## 2023-07-28 PROCEDURE — 99232 SBSQ HOSP IP/OBS MODERATE 35: CPT | Performed by: PSYCHIATRY & NEUROLOGY

## 2023-07-28 PROCEDURE — 6370000000 HC RX 637 (ALT 250 FOR IP)

## 2023-07-28 PROCEDURE — 84443 ASSAY THYROID STIM HORMONE: CPT

## 2023-07-28 RX ORDER — NITROFURANTOIN 25; 75 MG/1; MG/1
100 CAPSULE ORAL EVERY 12 HOURS SCHEDULED
Status: COMPLETED | OUTPATIENT
Start: 2023-07-28 | End: 2023-08-01

## 2023-07-28 RX ORDER — HALOPERIDOL 2 MG/ML
10 SOLUTION ORAL 2 TIMES DAILY
Status: DISCONTINUED | OUTPATIENT
Start: 2023-07-28 | End: 2023-08-02

## 2023-07-28 RX ADMIN — NITROFURANTOIN MONOHYDRATE/MACROCRYSTALLINE 100 MG: 25; 75 CAPSULE ORAL at 21:41

## 2023-07-28 RX ADMIN — HALOPERIDOL 10 MG: 2 SOLUTION ORAL at 21:41

## 2023-07-28 RX ADMIN — NITROFURANTOIN MACROCRYSTALS 50 MG: 50 CAPSULE ORAL at 08:59

## 2023-07-28 RX ADMIN — TRAZODONE HYDROCHLORIDE 50 MG: 50 TABLET ORAL at 21:42

## 2023-07-28 RX ADMIN — HALOPERIDOL 7.5 MG: 2 SOLUTION ORAL at 08:55

## 2023-07-28 RX ADMIN — HYDROXYZINE HYDROCHLORIDE 50 MG: 25 TABLET, FILM COATED ORAL at 03:26

## 2023-07-28 RX ADMIN — NITROFURANTOIN MONOHYDRATE/MACROCRYSTALLINE 100 MG: 25; 75 CAPSULE ORAL at 09:49

## 2023-07-28 RX ADMIN — OXYBUTYNIN CHLORIDE 15 MG: 5 TABLET, EXTENDED RELEASE ORAL at 08:55

## 2023-07-28 RX ADMIN — ATORVASTATIN CALCIUM 20 MG: 40 TABLET, FILM COATED ORAL at 08:55

## 2023-07-28 RX ADMIN — GLIPIZIDE 5 MG: 5 TABLET ORAL at 06:06

## 2023-07-28 RX ADMIN — BENZTROPINE MESYLATE 1 MG: 1 TABLET ORAL at 21:41

## 2023-07-28 RX ADMIN — HYDROXYZINE HYDROCHLORIDE 50 MG: 25 TABLET, FILM COATED ORAL at 21:41

## 2023-07-28 NOTE — PLAN OF CARE
0830 Patient walking in the hallway. Cooperative and compliant with assessment. Denied any depression, SI, HI, A/V hallucinations, but appears to be anxious as evidenced by pacing in her room and talking to herself. No complain of pain. Took all her morning medications. Vital signs within normal parameters. Will continue to monitor. Problem: Safety - Adult  Goal: Free from fall injury  7/27/2023 2259 by Hima Funez RN  Outcome: Progressing     Problem: Psychosis  Goal: Will report no hallucinations or delusions  Description: INTERVENTIONS:  1. Administer medication as  ordered  2. Assist with reality testing to support increasing orientation  3.  Assess if patient's hallucinations or delusions are encouraging self harm or harm to others and intervene as appropriate  7/28/2023 1053 by Ed Boyd RN  Outcome: Progressing  7/27/2023 2259 by Hima Funez RN  Outcome: Progressing

## 2023-07-28 NOTE — PLAN OF CARE
Problem: Discharge Planning  Goal: Discharge to home or other facility with appropriate resources  7/27/2023 1150 by Bri Carrillo RN  Outcome: Not Progressing  Flowsheets (Taken 7/27/2023 0845)  Discharge to home or other facility with appropriate resources: Identify barriers to discharge with patient and caregiver     Problem: Psychosis  Goal: Will report no hallucinations or delusions  Description: INTERVENTIONS:  1. Administer medication as  ordered  2. Assist with reality testing to support increasing orientation  3.  Assess if patient's hallucinations or delusions are encouraging self harm or harm to others and intervene as appropriate  7/27/2023 2259 by Ashwini Reese RN  Outcome: Progressing  7/27/2023 1150 by Bri Carrillo RN  Outcome: Not Progressing

## 2023-07-29 PROCEDURE — 1240000000 HC EMOTIONAL WELLNESS R&B

## 2023-07-29 PROCEDURE — 6370000000 HC RX 637 (ALT 250 FOR IP): Performed by: PSYCHIATRY & NEUROLOGY

## 2023-07-29 PROCEDURE — 6370000000 HC RX 637 (ALT 250 FOR IP)

## 2023-07-29 RX ADMIN — HYDROXYZINE HYDROCHLORIDE 50 MG: 25 TABLET, FILM COATED ORAL at 20:47

## 2023-07-29 RX ADMIN — POLYETHYLENE GLYCOL 3350 17 G: 17 POWDER, FOR SOLUTION ORAL at 13:31

## 2023-07-29 RX ADMIN — HALOPERIDOL 10 MG: 2 SOLUTION ORAL at 08:42

## 2023-07-29 RX ADMIN — TRAZODONE HYDROCHLORIDE 50 MG: 50 TABLET ORAL at 20:47

## 2023-07-29 RX ADMIN — NITROFURANTOIN MONOHYDRATE/MACROCRYSTALLINE 100 MG: 25; 75 CAPSULE ORAL at 20:47

## 2023-07-29 RX ADMIN — GLIPIZIDE 5 MG: 5 TABLET ORAL at 05:51

## 2023-07-29 RX ADMIN — HALOPERIDOL 10 MG: 2 SOLUTION ORAL at 20:47

## 2023-07-29 RX ADMIN — ATORVASTATIN CALCIUM 20 MG: 40 TABLET, FILM COATED ORAL at 08:42

## 2023-07-29 RX ADMIN — NITROFURANTOIN MONOHYDRATE/MACROCRYSTALLINE 100 MG: 25; 75 CAPSULE ORAL at 08:42

## 2023-07-29 RX ADMIN — OXYBUTYNIN CHLORIDE 15 MG: 5 TABLET, EXTENDED RELEASE ORAL at 08:42

## 2023-07-29 RX ADMIN — BENZTROPINE MESYLATE 1 MG: 1 TABLET ORAL at 20:47

## 2023-07-29 NOTE — PLAN OF CARE
Problem: Behavior  Goal: Pt/Family maintain appropriate behavior and adhere to behavioral management agreement, if implemented  Description: INTERVENTIONS:  1. Assess patient/family's coping skills and  non-compliant behavior (including use of illegal substances)  2. Notify security of behavior or suspected illegal substances which indicate the need for search of the family and/or belongings  3. Encourage verbalization of thoughts and concerns in a socially appropriate manner  4. Utilize positive, consistent limit setting strategies supporting safety of patient, staff and others  5. Encourage participation in the decision making process about the behavioral management agreement  6. If a visitor's behavior poses a threat to safety call refer to organization policy. 7. Initiate consult with , Psychosocial CNS, Spiritual Care as appropriate  Outcome: Progressing     Problem: Psychosis  Goal: Will report no hallucinations or delusions  Description: INTERVENTIONS:  1. Administer medication as  ordered  2. Assist with reality testing to support increasing orientation  3.  Assess if patient's hallucinations or delusions are encouraging self harm or harm to others and intervene as appropriate  7/28/2023 1053 by Michael Alvarez RN  Outcome: Progressing

## 2023-07-30 PROCEDURE — 6370000000 HC RX 637 (ALT 250 FOR IP)

## 2023-07-30 PROCEDURE — 6370000000 HC RX 637 (ALT 250 FOR IP): Performed by: PSYCHIATRY & NEUROLOGY

## 2023-07-30 PROCEDURE — 1240000000 HC EMOTIONAL WELLNESS R&B

## 2023-07-30 RX ADMIN — TRAZODONE HYDROCHLORIDE 50 MG: 50 TABLET ORAL at 21:25

## 2023-07-30 RX ADMIN — HALOPERIDOL 10 MG: 2 SOLUTION ORAL at 09:09

## 2023-07-30 RX ADMIN — BENZTROPINE MESYLATE 1 MG: 1 TABLET ORAL at 21:26

## 2023-07-30 RX ADMIN — HALOPERIDOL 10 MG: 2 SOLUTION ORAL at 21:25

## 2023-07-30 RX ADMIN — OXYBUTYNIN CHLORIDE 15 MG: 5 TABLET, EXTENDED RELEASE ORAL at 09:10

## 2023-07-30 RX ADMIN — NITROFURANTOIN MONOHYDRATE/MACROCRYSTALLINE 100 MG: 25; 75 CAPSULE ORAL at 09:10

## 2023-07-30 RX ADMIN — ATORVASTATIN CALCIUM 20 MG: 40 TABLET, FILM COATED ORAL at 09:09

## 2023-07-30 RX ADMIN — NITROFURANTOIN MONOHYDRATE/MACROCRYSTALLINE 100 MG: 25; 75 CAPSULE ORAL at 21:25

## 2023-07-30 RX ADMIN — GLIPIZIDE 5 MG: 5 TABLET ORAL at 06:58

## 2023-07-30 RX ADMIN — HYDROXYZINE HYDROCHLORIDE 50 MG: 25 TABLET, FILM COATED ORAL at 21:25

## 2023-07-30 NOTE — PLAN OF CARE
Problem: Behavior  Goal: Pt/Family maintain appropriate behavior and adhere to behavioral management agreement, if implemented  Description: INTERVENTIONS:  1. Assess patient/family's coping skills and  non-compliant behavior (including use of illegal substances)  2. Notify security of behavior or suspected illegal substances which indicate the need for search of the family and/or belongings  3. Encourage verbalization of thoughts and concerns in a socially appropriate manner  4. Utilize positive, consistent limit setting strategies supporting safety of patient, staff and others  5. Encourage participation in the decision making process about the behavioral management agreement  6. If a visitor's behavior poses a threat to safety call refer to organization policy. 7. Initiate consult with , Psychosocial CNS, Spiritual Care as appropriate  Outcome: Progressing     Problem: Anxiety  Goal: Will report anxiety at manageable levels  Description: INTERVENTIONS:  1. Administer medication as ordered  2. Teach and rehearse alternative coping skills  3.  Provide emotional support with 1:1 interaction with staff  Outcome: Progressing

## 2023-07-30 NOTE — PLAN OF CARE
Problem: Safety - Adult  Goal: Free from fall injury  Outcome: Progressing     Patient alert, labile, illogical, bizarre, disorganized, poor self care, cooperative. Denies SI/HI. Denies anxiety and depression. Denies pain. Denies AVH. Medication and meal compliant. No distress observed. No concerns expressed at this time. Patient was changed and assisted with ADLs as she is incontinent at times. Q15 minutes and hourly checks ongoing.

## 2023-07-31 PROCEDURE — 6370000000 HC RX 637 (ALT 250 FOR IP)

## 2023-07-31 PROCEDURE — 6370000000 HC RX 637 (ALT 250 FOR IP): Performed by: PSYCHIATRY & NEUROLOGY

## 2023-07-31 PROCEDURE — 6360000002 HC RX W HCPCS: Performed by: PSYCHIATRY & NEUROLOGY

## 2023-07-31 PROCEDURE — 99232 SBSQ HOSP IP/OBS MODERATE 35: CPT | Performed by: PSYCHIATRY & NEUROLOGY

## 2023-07-31 PROCEDURE — 1240000000 HC EMOTIONAL WELLNESS R&B

## 2023-07-31 RX ORDER — DEXTROSE MONOHYDRATE 100 MG/ML
INJECTION, SOLUTION INTRAVENOUS CONTINUOUS PRN
Status: DISCONTINUED | OUTPATIENT
Start: 2023-07-31 | End: 2023-08-10 | Stop reason: HOSPADM

## 2023-07-31 RX ORDER — HALOPERIDOL DECANOATE 100 MG/ML
250 INJECTION INTRAMUSCULAR
Status: DISCONTINUED | OUTPATIENT
Start: 2023-07-31 | End: 2023-08-10 | Stop reason: HOSPADM

## 2023-07-31 RX ORDER — AMLODIPINE BESYLATE 5 MG/1
5 TABLET ORAL DAILY
Status: DISCONTINUED | OUTPATIENT
Start: 2023-08-01 | End: 2023-08-09

## 2023-07-31 RX ADMIN — ATORVASTATIN CALCIUM 20 MG: 40 TABLET, FILM COATED ORAL at 08:34

## 2023-07-31 RX ADMIN — OXYBUTYNIN CHLORIDE 15 MG: 5 TABLET, EXTENDED RELEASE ORAL at 08:34

## 2023-07-31 RX ADMIN — HALOPERIDOL 10 MG: 2 SOLUTION ORAL at 21:13

## 2023-07-31 RX ADMIN — HALOPERIDOL DECANOATE 250 MG: 100 INJECTION INTRAMUSCULAR at 10:49

## 2023-07-31 RX ADMIN — BENZTROPINE MESYLATE 1 MG: 1 TABLET ORAL at 21:13

## 2023-07-31 RX ADMIN — HALOPERIDOL 10 MG: 2 SOLUTION ORAL at 08:34

## 2023-07-31 RX ADMIN — GLIPIZIDE 5 MG: 5 TABLET ORAL at 05:57

## 2023-07-31 RX ADMIN — NITROFURANTOIN MONOHYDRATE/MACROCRYSTALLINE 100 MG: 25; 75 CAPSULE ORAL at 08:35

## 2023-07-31 RX ADMIN — NITROFURANTOIN MONOHYDRATE/MACROCRYSTALLINE 100 MG: 25; 75 CAPSULE ORAL at 21:13

## 2023-07-31 RX ADMIN — TRAZODONE HYDROCHLORIDE 50 MG: 50 TABLET ORAL at 21:13

## 2023-07-31 ASSESSMENT — PAIN SCALES - GENERAL: PAINLEVEL_OUTOF10: 0

## 2023-07-31 NOTE — PLAN OF CARE
Problem: Behavior  Goal: Pt/Family maintain appropriate behavior and adhere to behavioral management agreement, if implemented  Description: INTERVENTIONS:  1. Assess patient/family's coping skills and  non-compliant behavior (including use of illegal substances)  2. Notify security of behavior or suspected illegal substances which indicate the need for search of the family and/or belongings  3. Encourage verbalization of thoughts and concerns in a socially appropriate manner  4. Utilize positive, consistent limit setting strategies supporting safety of patient, staff and others  5. Encourage participation in the decision making process about the behavioral management agreement  6. If a visitor's behavior poses a threat to safety call refer to organization policy.   7. Initiate consult with , Psychosocial CNS, Spiritual Care as appropriate  Outcome: Progressing     Problem: Safety - Adult  Goal: Free from fall injury  7/30/2023 0752 by Deidre Pulido RN  Outcome: Progressing

## 2023-07-31 NOTE — GROUP NOTE
Group Therapy Note    Date: 7/31/2023    Group Start Time: 1000  Group End Time: 1100  Group Topic: Topic Group    [address] ACUTE [facility]        Group Therapy Note    Attendees:        Patient's Goal:  To participate in chair exercise routine    Notes:  Pt did not attend session    Discipline Responsible: Recreational Therapist      Signature:  Rossy Humphreys

## 2023-07-31 NOTE — PLAN OF CARE
Problem: Behavior  Goal: Pt/Family maintain appropriate behavior and adhere to behavioral management agreement, if implemented  Description: INTERVENTIONS:  1. Assess patient/family's coping skills and  non-compliant behavior (including use of illegal substances)  2. Notify security of behavior or suspected illegal substances which indicate the need for search of the family and/or belongings  3. Encourage verbalization of thoughts and concerns in a socially appropriate manner  4. Utilize positive, consistent limit setting strategies supporting safety of patient, staff and others  5. Encourage participation in the decision making process about the behavioral management agreement  6. If a visitor's behavior poses a threat to safety call refer to organization policy.   7. Initiate consult with , Psychosocial CNS, Spiritual Care as appropriate  Outcome: Progressing

## 2023-07-31 NOTE — GROUP NOTE
Group Therapy Note    Date: 7/31/2023    Group Start Time: 1400  Group End Time: 1500  Group Topic: Recreational    [address] [facility]    [address]        Group Therapy Note    Attendees:        Patient's Goal:  To concentrate on selected task    Notes:  Pt did not attend session    Discipline Responsible: Recreational Therapist      Signature:  Myles Jennings

## 2023-08-01 LAB
GLUCOSE BLD STRIP.AUTO-MCNC: 140 MG/DL (ref 65–117)
SERVICE CMNT-IMP: ABNORMAL

## 2023-08-01 PROCEDURE — 6370000000 HC RX 637 (ALT 250 FOR IP)

## 2023-08-01 PROCEDURE — 82962 GLUCOSE BLOOD TEST: CPT

## 2023-08-01 PROCEDURE — 99232 SBSQ HOSP IP/OBS MODERATE 35: CPT | Performed by: PSYCHIATRY & NEUROLOGY

## 2023-08-01 PROCEDURE — 6370000000 HC RX 637 (ALT 250 FOR IP): Performed by: PSYCHIATRY & NEUROLOGY

## 2023-08-01 PROCEDURE — 1240000000 HC EMOTIONAL WELLNESS R&B

## 2023-08-01 RX ORDER — LITHIUM CARBONATE 300 MG/1
600 TABLET, FILM COATED, EXTENDED RELEASE ORAL EVERY 12 HOURS SCHEDULED
Status: DISCONTINUED | OUTPATIENT
Start: 2023-08-01 | End: 2023-08-10 | Stop reason: HOSPADM

## 2023-08-01 RX ORDER — LITHIUM CARBONATE 300 MG/1
600 TABLET, FILM COATED, EXTENDED RELEASE ORAL EVERY 12 HOURS SCHEDULED
Status: DISCONTINUED | OUTPATIENT
Start: 2023-08-01 | End: 2023-08-01

## 2023-08-01 RX ORDER — IBUPROFEN 400 MG/1
400 TABLET ORAL EVERY 6 HOURS PRN
Status: DISCONTINUED | OUTPATIENT
Start: 2023-08-01 | End: 2023-08-01

## 2023-08-01 RX ADMIN — AMLODIPINE BESYLATE 5 MG: 5 TABLET ORAL at 08:20

## 2023-08-01 RX ADMIN — LITHIUM CARBONATE 600 MG: 300 TABLET, FILM COATED, EXTENDED RELEASE ORAL at 12:41

## 2023-08-01 RX ADMIN — TRAZODONE HYDROCHLORIDE 50 MG: 50 TABLET ORAL at 20:42

## 2023-08-01 RX ADMIN — GLIPIZIDE 5 MG: 5 TABLET ORAL at 06:38

## 2023-08-01 RX ADMIN — NITROFURANTOIN MONOHYDRATE/MACROCRYSTALLINE 100 MG: 25; 75 CAPSULE ORAL at 08:20

## 2023-08-01 RX ADMIN — HALOPERIDOL 10 MG: 2 SOLUTION ORAL at 08:19

## 2023-08-01 RX ADMIN — NITROFURANTOIN MONOHYDRATE/MACROCRYSTALLINE 100 MG: 25; 75 CAPSULE ORAL at 20:42

## 2023-08-01 RX ADMIN — HALOPERIDOL 10 MG: 2 SOLUTION ORAL at 20:42

## 2023-08-01 RX ADMIN — OXYBUTYNIN CHLORIDE 15 MG: 5 TABLET, EXTENDED RELEASE ORAL at 08:19

## 2023-08-01 RX ADMIN — BENZTROPINE MESYLATE 1 MG: 1 TABLET ORAL at 20:42

## 2023-08-01 RX ADMIN — ATORVASTATIN CALCIUM 20 MG: 40 TABLET, FILM COATED ORAL at 08:20

## 2023-08-01 RX ADMIN — LITHIUM CARBONATE 600 MG: 300 TABLET, FILM COATED, EXTENDED RELEASE ORAL at 20:43

## 2023-08-01 ASSESSMENT — PAIN SCALES - GENERAL
PAINLEVEL_OUTOF10: 0
PAINLEVEL_OUTOF10: 0

## 2023-08-01 NOTE — GROUP NOTE
Group Therapy Note    Date: 8/1/2023    Group Start Time: 1400  Group End Time: 1500  Group Topic: Recreational    [address] [facility]    [address]        Group Therapy Note    Attendees:        Patient's Goal:  To concentrate on selected task    Notes:  Labile ,tearful at times,intrusive,loud,required support/redirection    Status After Intervention:  Unchanged    Participation Level: Monopolizing    Participation Quality: Inappropriate      Thought Process/Content: Flight of ideas      Endings: None Reported    Modes of Intervention: Socialization and Activity      Discipline Responsible: Recreational Therapist      Signature:  Gonzalo Wilson

## 2023-08-01 NOTE — GROUP NOTE
Group Therapy Note    Date: 8/1/2023    Group Start Time: 1000  Group End Time: 1100  Group Topic: Topic Group    [place] [address] AC[place] BE[place] [address] [place] [address]        Group Therapy Note    Attendees:        Patient's Goal:  To participate in chair exercise routine    Notes:  Pt did not attend session    Discipline Responsible: Recreational Therapist      Signature:  Van Avendano

## 2023-08-02 LAB
GLUCOSE BLD STRIP.AUTO-MCNC: 132 MG/DL (ref 65–117)
SERVICE CMNT-IMP: ABNORMAL

## 2023-08-02 PROCEDURE — 6370000000 HC RX 637 (ALT 250 FOR IP): Performed by: PSYCHIATRY & NEUROLOGY

## 2023-08-02 PROCEDURE — 6370000000 HC RX 637 (ALT 250 FOR IP)

## 2023-08-02 PROCEDURE — 1240000000 HC EMOTIONAL WELLNESS R&B

## 2023-08-02 PROCEDURE — 82962 GLUCOSE BLOOD TEST: CPT

## 2023-08-02 PROCEDURE — 99232 SBSQ HOSP IP/OBS MODERATE 35: CPT | Performed by: PSYCHIATRY & NEUROLOGY

## 2023-08-02 RX ORDER — HALOPERIDOL 2 MG/ML
12.5 SOLUTION ORAL 2 TIMES DAILY
Status: DISCONTINUED | OUTPATIENT
Start: 2023-08-02 | End: 2023-08-07

## 2023-08-02 RX ADMIN — ACETAMINOPHEN 650 MG: 325 TABLET ORAL at 00:14

## 2023-08-02 RX ADMIN — BENZTROPINE MESYLATE 1 MG: 1 TABLET ORAL at 21:32

## 2023-08-02 RX ADMIN — ACETAMINOPHEN 650 MG: 325 TABLET ORAL at 21:42

## 2023-08-02 RX ADMIN — GLIPIZIDE 5 MG: 5 TABLET ORAL at 07:03

## 2023-08-02 RX ADMIN — LITHIUM CARBONATE 600 MG: 300 TABLET, FILM COATED, EXTENDED RELEASE ORAL at 08:34

## 2023-08-02 RX ADMIN — LITHIUM CARBONATE 600 MG: 300 TABLET, FILM COATED, EXTENDED RELEASE ORAL at 21:23

## 2023-08-02 RX ADMIN — ATORVASTATIN CALCIUM 20 MG: 40 TABLET, FILM COATED ORAL at 08:35

## 2023-08-02 RX ADMIN — AMLODIPINE BESYLATE 5 MG: 5 TABLET ORAL at 08:35

## 2023-08-02 RX ADMIN — HALOPERIDOL 10 MG: 2 SOLUTION ORAL at 08:35

## 2023-08-02 RX ADMIN — HYDROXYZINE HYDROCHLORIDE 50 MG: 25 TABLET, FILM COATED ORAL at 00:14

## 2023-08-02 RX ADMIN — TRAZODONE HYDROCHLORIDE 50 MG: 50 TABLET ORAL at 21:23

## 2023-08-02 RX ADMIN — OXYBUTYNIN CHLORIDE 15 MG: 5 TABLET, EXTENDED RELEASE ORAL at 08:35

## 2023-08-02 RX ADMIN — HALOPERIDOL 12.5 MG: 2 SOLUTION ORAL at 21:22

## 2023-08-02 ASSESSMENT — PAIN - FUNCTIONAL ASSESSMENT
PAIN_FUNCTIONAL_ASSESSMENT: ACTIVITIES ARE NOT PREVENTED
PAIN_FUNCTIONAL_ASSESSMENT: ACTIVITIES ARE NOT PREVENTED

## 2023-08-02 ASSESSMENT — PAIN SCALES - GENERAL
PAINLEVEL_OUTOF10: 0
PAINLEVEL_OUTOF10: 0
PAINLEVEL_OUTOF10: 5
PAINLEVEL_OUTOF10: 5
PAINLEVEL_OUTOF10: 3

## 2023-08-02 ASSESSMENT — PAIN DESCRIPTION - DESCRIPTORS
DESCRIPTORS: ACHING;THROBBING
DESCRIPTORS: THROBBING

## 2023-08-02 ASSESSMENT — PAIN SCALES - WONG BAKER: WONGBAKER_NUMERICALRESPONSE: 0

## 2023-08-02 ASSESSMENT — PAIN DESCRIPTION - LOCATION
LOCATION: HEAD

## 2023-08-02 NOTE — PLAN OF CARE
This writer met with pt in her bedroom. Affect expansive, eye contact good, mood labile. Pt initially hugged this writer and was smiling, then became tearful. Has paranoid delusions that she was given emergency surgery this morning. Mood quickly fluctuates between pleasant and elated to hostile and irritable. Responsive to staff redirection. Denies SI/HI/AVH. Compliant with scheduled medication. Observed RIS and self-dialoguing in bedroom. Restless and often pacing between bedroom and day room. Will continue to offer support. Problem: Behavior  Goal: Pt/Family maintain appropriate behavior and adhere to behavioral management agreement, if implemented  Description: INTERVENTIONS:  1. Assess patient/family's coping skills and  non-compliant behavior (including use of illegal substances)  2. Notify security of behavior or suspected illegal substances which indicate the need for search of the family and/or belongings  3. Encourage verbalization of thoughts and concerns in a socially appropriate manner  4. Utilize positive, consistent limit setting strategies supporting safety of patient, staff and others  5. Encourage participation in the decision making process about the behavioral management agreement  6. If a visitor's behavior poses a threat to safety call refer to organization policy.   7. Initiate consult with , Psychosocial CNS, Spiritual Care as appropriate  Outcome: Progressing

## 2023-08-02 NOTE — GROUP NOTE
Group Therapy Note    Date: 8/2/2023    Group Start Time: 1000  Group End Time: 1100  Group Topic: Topic Group    [address] [facility]    Charmaine [facility]en        Group Therapy Note    Attendees:        Patient's Goal:  To participate in relaxation activity    Notes:  Labile,hyper verbal,loose thoughts,intrusive    Status After Intervention:  Unchanged    Participation Level: Monopolizing    Participation Quality: Inappropriate      Speech:  loud      Thought Process/Content: Flight of ideas      Endings: None Reported    Modes of Intervention: Activity      Discipline Responsible: Recreational Therapist      Signature:  Raul Rosas

## 2023-08-02 NOTE — GROUP NOTE
Group Therapy Note    Date: 8/2/2023    Group Start Time: 1400  Group End Time: 1500  Group Topic: Recreational    [address] [facility]    [address]        Group Therapy Note    Attendees:        Patient's Goal:  To concentrate on selected task    Notes:  Pt did not attend session    Discipline Responsible: Recreational Therapist      Signature:  Giselle Gomez

## 2023-08-03 LAB
BASOPHILS # BLD: 0.1 K/UL (ref 0–0.1)
BASOPHILS NFR BLD: 1 % (ref 0–1)
DIFFERENTIAL METHOD BLD: NORMAL
EOSINOPHIL # BLD: 0.2 K/UL (ref 0–0.4)
EOSINOPHIL NFR BLD: 2 % (ref 0–7)
ERYTHROCYTE [DISTWIDTH] IN BLOOD BY AUTOMATED COUNT: 13 % (ref 11.5–14.5)
GLUCOSE BLD STRIP.AUTO-MCNC: 129 MG/DL (ref 65–117)
HCT VFR BLD AUTO: 36.7 % (ref 35–47)
HGB BLD-MCNC: 12.5 G/DL (ref 11.5–16)
IMM GRANULOCYTES # BLD AUTO: 0 K/UL (ref 0–0.04)
IMM GRANULOCYTES NFR BLD AUTO: 0 % (ref 0–0.5)
LYMPHOCYTES # BLD: 2.1 K/UL (ref 0.8–3.5)
LYMPHOCYTES NFR BLD: 27 % (ref 12–49)
MCH RBC QN AUTO: 32.1 PG (ref 26–34)
MCHC RBC AUTO-ENTMCNC: 34.1 G/DL (ref 30–36.5)
MCV RBC AUTO: 94.1 FL (ref 80–99)
MONOCYTES # BLD: 0.8 K/UL (ref 0–1)
MONOCYTES NFR BLD: 10 % (ref 5–13)
NEUTS SEG # BLD: 4.9 K/UL (ref 1.8–8)
NEUTS SEG NFR BLD: 60 % (ref 32–75)
NRBC # BLD: 0 K/UL (ref 0–0.01)
NRBC BLD-RTO: 0 PER 100 WBC
PLATELET # BLD AUTO: 195 K/UL (ref 150–400)
PMV BLD AUTO: 10.4 FL (ref 8.9–12.9)
RBC # BLD AUTO: 3.9 M/UL (ref 3.8–5.2)
SERVICE CMNT-IMP: ABNORMAL
WBC # BLD AUTO: 8 K/UL (ref 3.6–11)

## 2023-08-03 PROCEDURE — 36415 COLL VENOUS BLD VENIPUNCTURE: CPT

## 2023-08-03 PROCEDURE — 99233 SBSQ HOSP IP/OBS HIGH 50: CPT | Performed by: PSYCHIATRY & NEUROLOGY

## 2023-08-03 PROCEDURE — 82962 GLUCOSE BLOOD TEST: CPT

## 2023-08-03 PROCEDURE — 85025 COMPLETE CBC W/AUTO DIFF WBC: CPT

## 2023-08-03 PROCEDURE — 1240000000 HC EMOTIONAL WELLNESS R&B

## 2023-08-03 PROCEDURE — 6370000000 HC RX 637 (ALT 250 FOR IP): Performed by: PSYCHIATRY & NEUROLOGY

## 2023-08-03 PROCEDURE — 6370000000 HC RX 637 (ALT 250 FOR IP)

## 2023-08-03 RX ORDER — CLOZAPINE 25 MG/1
25 TABLET ORAL NIGHTLY
Status: DISCONTINUED | OUTPATIENT
Start: 2023-08-03 | End: 2023-08-04

## 2023-08-03 RX ADMIN — LITHIUM CARBONATE 600 MG: 300 TABLET, FILM COATED, EXTENDED RELEASE ORAL at 08:32

## 2023-08-03 RX ADMIN — NITROFURANTOIN MACROCRYSTALS 50 MG: 50 CAPSULE ORAL at 09:45

## 2023-08-03 RX ADMIN — OXYBUTYNIN CHLORIDE 15 MG: 5 TABLET, EXTENDED RELEASE ORAL at 08:31

## 2023-08-03 RX ADMIN — AMLODIPINE BESYLATE 5 MG: 5 TABLET ORAL at 08:32

## 2023-08-03 RX ADMIN — CLOZAPINE 25 MG: 25 TABLET ORAL at 21:58

## 2023-08-03 RX ADMIN — GLIPIZIDE 5 MG: 5 TABLET ORAL at 06:25

## 2023-08-03 RX ADMIN — HALOPERIDOL 12.5 MG: 2 SOLUTION ORAL at 21:56

## 2023-08-03 RX ADMIN — LITHIUM CARBONATE 600 MG: 300 TABLET, FILM COATED, EXTENDED RELEASE ORAL at 21:58

## 2023-08-03 RX ADMIN — HYDROXYZINE HYDROCHLORIDE 50 MG: 25 TABLET, FILM COATED ORAL at 21:58

## 2023-08-03 RX ADMIN — ATORVASTATIN CALCIUM 20 MG: 40 TABLET, FILM COATED ORAL at 08:31

## 2023-08-03 RX ADMIN — HALOPERIDOL 12.5 MG: 2 SOLUTION ORAL at 08:33

## 2023-08-03 RX ADMIN — ACETAMINOPHEN 650 MG: 325 TABLET ORAL at 21:58

## 2023-08-03 RX ADMIN — BENZTROPINE MESYLATE 1 MG: 1 TABLET ORAL at 21:58

## 2023-08-03 ASSESSMENT — PAIN DESCRIPTION - DESCRIPTORS: DESCRIPTORS: ACHING;SORE

## 2023-08-03 ASSESSMENT — PAIN DESCRIPTION - LOCATION: LOCATION: BREAST;BACK

## 2023-08-03 ASSESSMENT — PAIN SCALES - GENERAL: PAINLEVEL_OUTOF10: 4

## 2023-08-03 ASSESSMENT — PAIN SCALES - WONG BAKER: WONGBAKER_NUMERICALRESPONSE: 0

## 2023-08-03 ASSESSMENT — PAIN - FUNCTIONAL ASSESSMENT: PAIN_FUNCTIONAL_ASSESSMENT: ACTIVITIES ARE NOT PREVENTED

## 2023-08-03 ASSESSMENT — PAIN DESCRIPTION - ORIENTATION: ORIENTATION: LOWER;MID

## 2023-08-03 NOTE — PLAN OF CARE
This writer met with pt in the day room. Affect expansive, eye contact appropriate, mood labile. Pt reported she felt depressed b/s she misses her . Pt then went on to say she was upset because Wong Rosas would not pay her rent and her  could not longer give her money to go shopping. Mood fluctuates between tearful and bright/pleasant. Denies SI/HI/AVH. Compliant with scheduled medication. Spent time in day room writing in her journal. Will continue to offer support. 1825: Occult blood screening ordered r/t pt report of blood in stool yesterday. Pt informed and given hat for toilet after lunch. At this time, pt has still not moved her bowels. Will continue to monitor. Problem: Behavior  Goal: Pt/Family maintain appropriate behavior and adhere to behavioral management agreement, if implemented  Description: INTERVENTIONS:  1. Assess patient/family's coping skills and  non-compliant behavior (including use of illegal substances)  2. Notify security of behavior or suspected illegal substances which indicate the need for search of the family and/or belongings  3. Encourage verbalization of thoughts and concerns in a socially appropriate manner  4. Utilize positive, consistent limit setting strategies supporting safety of patient, staff and others  5. Encourage participation in the decision making process about the behavioral management agreement  6. If a visitor's behavior poses a threat to safety call refer to organization policy.   7. Initiate consult with , Psychosocial CNS, Spiritual Care as appropriate  Outcome: Progressing

## 2023-08-03 NOTE — GROUP NOTE
Group Therapy Note    Date: 8/3/2023    Group Start Time: 1400  Group End Time: 1500  Group Topic: Recreational    [address] [facility]    Charmaine [facility]        Group Therapy Note    Attendees:        Patient's Goal:  To concentrate on selected task    Notes:  Pleasant,elected to listen to music-at times loud,loose thoughts    Status After Intervention:  Unchanged    Endings: None Reported    Modes of Intervention: Socialization and Activity      Discipline Responsible: Recreational Therapist      Signature:  Roya Dowell

## 2023-08-03 NOTE — GROUP NOTE
Group Therapy Note    Date: 8/3/2023    Group Start Time: 1000  Group End Time: 1100  Group Topic: Topic Group    [address] ACUTE [facility] [address] [facility]        Group Therapy Note    Attendees:        Patient's Goal:  To participate in relaxation activity    Notes:  [id number] times,loose thoughts    Status After Intervention:  Improved    Participation Level: Interactive    Participation Quality: Attentive and Sharing      Speech:  loud      Mood: elevated      Level of consciousness:  Attentive      Response to Learning: Progressing to goal      Endings: None Reported    Modes of Intervention: Activity      Discipline Responsible: Recreational Therapist      Signature:  Gonzalo Wilson

## 2023-08-04 LAB
GLUCOSE BLD STRIP.AUTO-MCNC: 128 MG/DL (ref 65–117)
SERVICE CMNT-IMP: ABNORMAL

## 2023-08-04 PROCEDURE — 99232 SBSQ HOSP IP/OBS MODERATE 35: CPT | Performed by: PSYCHIATRY & NEUROLOGY

## 2023-08-04 PROCEDURE — 82962 GLUCOSE BLOOD TEST: CPT

## 2023-08-04 PROCEDURE — 6370000000 HC RX 637 (ALT 250 FOR IP): Performed by: PSYCHIATRY & NEUROLOGY

## 2023-08-04 PROCEDURE — 6370000000 HC RX 637 (ALT 250 FOR IP)

## 2023-08-04 PROCEDURE — 1240000000 HC EMOTIONAL WELLNESS R&B

## 2023-08-04 RX ORDER — CLOZAPINE 25 MG/1
50 TABLET ORAL NIGHTLY
Status: DISCONTINUED | OUTPATIENT
Start: 2023-08-04 | End: 2023-08-04

## 2023-08-04 RX ORDER — CLOZAPINE 100 MG/1
100 TABLET ORAL NIGHTLY
Status: COMPLETED | OUTPATIENT
Start: 2023-08-05 | End: 2023-08-05

## 2023-08-04 RX ORDER — CLOZAPINE 25 MG/1
50 TABLET ORAL NIGHTLY
Status: COMPLETED | OUTPATIENT
Start: 2023-08-04 | End: 2023-08-04

## 2023-08-04 RX ORDER — CLOZAPINE 100 MG/1
200 TABLET ORAL NIGHTLY
Status: DISCONTINUED | OUTPATIENT
Start: 2023-08-07 | End: 2023-08-07

## 2023-08-04 RX ADMIN — OXYBUTYNIN CHLORIDE 15 MG: 5 TABLET, EXTENDED RELEASE ORAL at 08:43

## 2023-08-04 RX ADMIN — BENZTROPINE MESYLATE 1 MG: 1 TABLET ORAL at 21:46

## 2023-08-04 RX ADMIN — HALOPERIDOL 12.5 MG: 2 SOLUTION ORAL at 21:45

## 2023-08-04 RX ADMIN — CLOZAPINE 50 MG: 25 TABLET ORAL at 21:45

## 2023-08-04 RX ADMIN — TRAZODONE HYDROCHLORIDE 50 MG: 50 TABLET ORAL at 21:45

## 2023-08-04 RX ADMIN — AMLODIPINE BESYLATE 5 MG: 5 TABLET ORAL at 08:43

## 2023-08-04 RX ADMIN — NITROFURANTOIN MACROCRYSTALS 50 MG: 50 CAPSULE ORAL at 08:43

## 2023-08-04 RX ADMIN — ACETAMINOPHEN 650 MG: 325 TABLET ORAL at 14:55

## 2023-08-04 RX ADMIN — LITHIUM CARBONATE 600 MG: 300 TABLET, FILM COATED, EXTENDED RELEASE ORAL at 08:43

## 2023-08-04 RX ADMIN — HALOPERIDOL 12.5 MG: 2 SOLUTION ORAL at 08:45

## 2023-08-04 RX ADMIN — HYDROXYZINE HYDROCHLORIDE 50 MG: 25 TABLET, FILM COATED ORAL at 21:46

## 2023-08-04 RX ADMIN — ATORVASTATIN CALCIUM 20 MG: 40 TABLET, FILM COATED ORAL at 08:44

## 2023-08-04 RX ADMIN — GLIPIZIDE 5 MG: 5 TABLET ORAL at 06:26

## 2023-08-04 RX ADMIN — LITHIUM CARBONATE 600 MG: 300 TABLET, FILM COATED, EXTENDED RELEASE ORAL at 21:45

## 2023-08-04 ASSESSMENT — PAIN SCALES - GENERAL
PAINLEVEL_OUTOF10: 7
PAINLEVEL_OUTOF10: 4
PAINLEVEL_OUTOF10: 0
PAINLEVEL_OUTOF10: 1

## 2023-08-04 ASSESSMENT — PAIN SCALES - WONG BAKER: WONGBAKER_NUMERICALRESPONSE: 0

## 2023-08-04 ASSESSMENT — PAIN DESCRIPTION - DESCRIPTORS
DESCRIPTORS: ACHING
DESCRIPTORS: ACHING

## 2023-08-04 ASSESSMENT — PAIN DESCRIPTION - LOCATION
LOCATION: CHEST
LOCATION: BACK

## 2023-08-04 NOTE — PLAN OF CARE
Problem: Psychosis  Goal: Will report no hallucinations or delusions  Description: INTERVENTIONS:  1. Administer medication as  ordered  2. Assist with reality testing to support increasing orientation  3. Assess if patient's hallucinations or delusions are encouraging self harm or harm to others and intervene as appropriate  Outcome: Not Progressing     Problem: Behavior  Goal: Pt/Family maintain appropriate behavior and adhere to behavioral management agreement, if implemented  Description: INTERVENTIONS:  1. Assess patient/family's coping skills and  non-compliant behavior (including use of illegal substances)  2. Notify security of behavior or suspected illegal substances which indicate the need for search of the family and/or belongings  3. Encourage verbalization of thoughts and concerns in a socially appropriate manner  4. Utilize positive, consistent limit setting strategies supporting safety of patient, staff and others  5. Encourage participation in the decision making process about the behavioral management agreement  6. If a visitor's behavior poses a threat to safety call refer to organization policy.   7. Initiate consult with , Psychosocial CNS, Spiritual Care as appropriate  Outcome: Not Progressing

## 2023-08-04 NOTE — GROUP NOTE
Group Therapy Note    Date: 8/4/2023    Group Start Time: 1000  Group End Time: 1100  Group Topic: Topic Group    [place] [address] AC[facility] [address] [facility]        Group Therapy Note    Attendees:        Patient's Goal:  To participate in relaxation activity    Notes:  Pleasant,appropriate during session    Status After Intervention:  Improved    Participation Level: Interactive    Participation Quality: Attentive      Speech:  normal        Affective Functioning: Congruent      Mood: euthymic      Level of consciousness:  Attentive      Response to Learning: Progressing to goal      Endings: None Reported    Modes of Intervention: Activity      Discipline Responsible: Recreational Therapist      Signature:  Harry Gray

## 2023-08-04 NOTE — GROUP NOTE
Group Therapy Note    Date: 8/4/2023    Group Start Time: 1400  Group End Time: 1500  Group Topic: Recreational    [facility] [place] [address] AC[place] [address] [facility]        Group Therapy Note    Attendees:        Patient's Goal:  To concentrate on selected task    Notes:  Pleasant -at times,loose thoughts-elected to listen to music and danced to favorite songs    Status After Intervention:  Improved    Participation Level:  Active Listener and Interactive    Participation Quality: Attentive      Response to Learning: Progressing to goal      Endings: None Reported    Modes of Intervention: Socialization and Activity      Discipline Responsible: Recreational Therapist      Signature:  Asia Tafoya

## 2023-08-05 PROCEDURE — 6370000000 HC RX 637 (ALT 250 FOR IP): Performed by: PSYCHIATRY & NEUROLOGY

## 2023-08-05 PROCEDURE — 90785 PSYTX COMPLEX INTERACTIVE: CPT | Performed by: PSYCHIATRY & NEUROLOGY

## 2023-08-05 PROCEDURE — 1240000000 HC EMOTIONAL WELLNESS R&B

## 2023-08-05 PROCEDURE — 99232 SBSQ HOSP IP/OBS MODERATE 35: CPT | Performed by: PSYCHIATRY & NEUROLOGY

## 2023-08-05 PROCEDURE — 90833 PSYTX W PT W E/M 30 MIN: CPT | Performed by: PSYCHIATRY & NEUROLOGY

## 2023-08-05 PROCEDURE — 6370000000 HC RX 637 (ALT 250 FOR IP)

## 2023-08-05 RX ADMIN — AMLODIPINE BESYLATE 5 MG: 5 TABLET ORAL at 08:21

## 2023-08-05 RX ADMIN — GLIPIZIDE 5 MG: 5 TABLET ORAL at 06:31

## 2023-08-05 RX ADMIN — ATORVASTATIN CALCIUM 20 MG: 40 TABLET, FILM COATED ORAL at 08:21

## 2023-08-05 RX ADMIN — LITHIUM CARBONATE 600 MG: 300 TABLET, FILM COATED, EXTENDED RELEASE ORAL at 08:21

## 2023-08-05 RX ADMIN — BENZTROPINE MESYLATE 1 MG: 1 TABLET ORAL at 21:12

## 2023-08-05 RX ADMIN — HALOPERIDOL 12.5 MG: 2 SOLUTION ORAL at 08:21

## 2023-08-05 RX ADMIN — OXYBUTYNIN CHLORIDE 15 MG: 5 TABLET, EXTENDED RELEASE ORAL at 08:21

## 2023-08-05 RX ADMIN — HALOPERIDOL 12.5 MG: 2 SOLUTION ORAL at 21:12

## 2023-08-05 RX ADMIN — CLOZAPINE 100 MG: 100 TABLET ORAL at 21:11

## 2023-08-05 RX ADMIN — LITHIUM CARBONATE 600 MG: 300 TABLET, FILM COATED, EXTENDED RELEASE ORAL at 21:12

## 2023-08-05 RX ADMIN — HYDROXYZINE HYDROCHLORIDE 50 MG: 25 TABLET, FILM COATED ORAL at 03:55

## 2023-08-05 RX ADMIN — NITROFURANTOIN MACROCRYSTALS 50 MG: 50 CAPSULE ORAL at 08:21

## 2023-08-05 RX ADMIN — TRAZODONE HYDROCHLORIDE 50 MG: 50 TABLET ORAL at 21:12

## 2023-08-05 NOTE — PLAN OF CARE
Problem: Psychosis  Goal: Will report no hallucinations or delusions  Description: INTERVENTIONS:  1. Administer medication as  ordered  2. Assist with reality testing to support increasing orientation  3. Assess if patient's hallucinations or delusions are encouraging self harm or harm to others and intervene as appropriate  8/4/2023 1153 by Noemy Ferguson RN  Outcome: Not Progressing     Problem: Behavior  Goal: Pt/Family maintain appropriate behavior and adhere to behavioral management agreement, if implemented  Description: INTERVENTIONS:  1. Assess patient/family's coping skills and  non-compliant behavior (including use of illegal substances)  2. Notify security of behavior or suspected illegal substances which indicate the need for search of the family and/or belongings  3. Encourage verbalization of thoughts and concerns in a socially appropriate manner  4. Utilize positive, consistent limit setting strategies supporting safety of patient, staff and others  5. Encourage participation in the decision making process about the behavioral management agreement  6. If a visitor's behavior poses a threat to safety call refer to organization policy.   7. Initiate consult with , Psychosocial CNS, Spiritual Care as appropriate  8/4/2023 1153 by Noemy Ferguson RN  Outcome: Not Progressing

## 2023-08-06 LAB
DATE LAST DOSE: NORMAL
DOSE AMOUNT: NORMAL UNITS
DOSE DATE/TIME: NORMAL
GLUCOSE BLD STRIP.AUTO-MCNC: 151 MG/DL (ref 65–117)
LITHIUM SERPL-SCNC: 1.03 MMOL/L (ref 0.6–1.2)
SERVICE CMNT-IMP: ABNORMAL

## 2023-08-06 PROCEDURE — 99233 SBSQ HOSP IP/OBS HIGH 50: CPT | Performed by: PSYCHIATRY & NEUROLOGY

## 2023-08-06 PROCEDURE — 6370000000 HC RX 637 (ALT 250 FOR IP)

## 2023-08-06 PROCEDURE — 6370000000 HC RX 637 (ALT 250 FOR IP): Performed by: PSYCHIATRY & NEUROLOGY

## 2023-08-06 PROCEDURE — 36415 COLL VENOUS BLD VENIPUNCTURE: CPT

## 2023-08-06 PROCEDURE — 80178 ASSAY OF LITHIUM: CPT

## 2023-08-06 PROCEDURE — 1240000000 HC EMOTIONAL WELLNESS R&B

## 2023-08-06 PROCEDURE — 82962 GLUCOSE BLOOD TEST: CPT

## 2023-08-06 RX ADMIN — LITHIUM CARBONATE 600 MG: 300 TABLET, FILM COATED, EXTENDED RELEASE ORAL at 20:43

## 2023-08-06 RX ADMIN — NITROFURANTOIN MACROCRYSTALS 50 MG: 50 CAPSULE ORAL at 08:54

## 2023-08-06 RX ADMIN — AMLODIPINE BESYLATE 5 MG: 5 TABLET ORAL at 08:54

## 2023-08-06 RX ADMIN — HYDROXYZINE HYDROCHLORIDE 50 MG: 25 TABLET, FILM COATED ORAL at 20:43

## 2023-08-06 RX ADMIN — HALOPERIDOL 12.5 MG: 2 SOLUTION ORAL at 08:51

## 2023-08-06 RX ADMIN — CLOZAPINE 150 MG: 25 TABLET ORAL at 20:42

## 2023-08-06 RX ADMIN — BENZTROPINE MESYLATE 1 MG: 1 TABLET ORAL at 20:43

## 2023-08-06 RX ADMIN — ACETAMINOPHEN 650 MG: 325 TABLET ORAL at 04:49

## 2023-08-06 RX ADMIN — OXYBUTYNIN CHLORIDE 15 MG: 5 TABLET, EXTENDED RELEASE ORAL at 08:53

## 2023-08-06 RX ADMIN — TRAZODONE HYDROCHLORIDE 50 MG: 50 TABLET ORAL at 20:43

## 2023-08-06 RX ADMIN — ATORVASTATIN CALCIUM 20 MG: 40 TABLET, FILM COATED ORAL at 08:54

## 2023-08-06 RX ADMIN — LITHIUM CARBONATE 600 MG: 300 TABLET, FILM COATED, EXTENDED RELEASE ORAL at 08:54

## 2023-08-06 RX ADMIN — HALOPERIDOL 12.5 MG: 2 SOLUTION ORAL at 20:41

## 2023-08-06 RX ADMIN — HYDROXYZINE HYDROCHLORIDE 50 MG: 25 TABLET, FILM COATED ORAL at 04:50

## 2023-08-06 RX ADMIN — GLIPIZIDE 5 MG: 5 TABLET ORAL at 06:35

## 2023-08-06 ASSESSMENT — PAIN DESCRIPTION - LOCATION: LOCATION: BACK

## 2023-08-06 ASSESSMENT — PAIN SCALES - GENERAL: PAINLEVEL_OUTOF10: 6

## 2023-08-06 NOTE — PLAN OF CARE
Problem: Anxiety  Goal: Will report anxiety at manageable levels  Description: INTERVENTIONS:  1. Administer medication as ordered  2. Teach and rehearse alternative coping skills  3.  Provide emotional support with 1:1 interaction with staff  Outcome: Progressing  Flowsheets (Taken 8/6/2023 1604)  Will report anxiety at manageable levels: Administer medication as ordered

## 2023-08-06 NOTE — PLAN OF CARE
Problem: Psychosis  Goal: Will report no hallucinations or delusions  Description: INTERVENTIONS:  1. Administer medication as  ordered  2. Assist with reality testing to support increasing orientation  3. Assess if patient's hallucinations or delusions are encouraging self harm or harm to others and intervene as appropriate  8/5/2023 2351 by Steven Martin RN  Outcome: Progressing  8/5/2023 1107 by Haim Zhao RN  Outcome: Not Progressing     Problem: Behavior  Goal: Pt/Family maintain appropriate behavior and adhere to behavioral management agreement, if implemented  Description: INTERVENTIONS:  1. Assess patient/family's coping skills and  non-compliant behavior (including use of illegal substances)  2. Notify security of behavior or suspected illegal substances which indicate the need for search of the family and/or belongings  3. Encourage verbalization of thoughts and concerns in a socially appropriate manner  4. Utilize positive, consistent limit setting strategies supporting safety of patient, staff and others  5. Encourage participation in the decision making process about the behavioral management agreement  6. If a visitor's behavior poses a threat to safety call refer to organization policy.   7. Initiate consult with , Psychosocial CNS, Spiritual Care as appropriate  8/5/2023 2351 by Steven Martin RN  Outcome: Progressing  8/5/2023 1107 by Haim Zhao RN  Outcome: Not Progressing

## 2023-08-07 LAB
GLUCOSE BLD STRIP.AUTO-MCNC: 246 MG/DL (ref 65–117)
SERVICE CMNT-IMP: ABNORMAL

## 2023-08-07 PROCEDURE — 1240000000 HC EMOTIONAL WELLNESS R&B

## 2023-08-07 PROCEDURE — 82962 GLUCOSE BLOOD TEST: CPT

## 2023-08-07 PROCEDURE — 6370000000 HC RX 637 (ALT 250 FOR IP)

## 2023-08-07 PROCEDURE — 99232 SBSQ HOSP IP/OBS MODERATE 35: CPT | Performed by: PSYCHIATRY & NEUROLOGY

## 2023-08-07 PROCEDURE — 6370000000 HC RX 637 (ALT 250 FOR IP): Performed by: PSYCHIATRY & NEUROLOGY

## 2023-08-07 RX ORDER — HALOPERIDOL 2 MG/ML
10 SOLUTION ORAL 2 TIMES DAILY
Status: DISCONTINUED | OUTPATIENT
Start: 2023-08-07 | End: 2023-08-10 | Stop reason: HOSPADM

## 2023-08-07 RX ORDER — CLOZAPINE 100 MG/1
300 TABLET ORAL NIGHTLY
Status: DISCONTINUED | OUTPATIENT
Start: 2023-08-07 | End: 2023-08-07

## 2023-08-07 RX ORDER — CLOZAPINE 100 MG/1
200 TABLET ORAL NIGHTLY
Status: COMPLETED | OUTPATIENT
Start: 2023-08-07 | End: 2023-08-07

## 2023-08-07 RX ORDER — CLOZAPINE 100 MG/1
300 TABLET ORAL NIGHTLY
Status: DISCONTINUED | OUTPATIENT
Start: 2023-08-09 | End: 2023-08-10 | Stop reason: HOSPADM

## 2023-08-07 RX ADMIN — OXYBUTYNIN CHLORIDE 15 MG: 5 TABLET, EXTENDED RELEASE ORAL at 08:25

## 2023-08-07 RX ADMIN — TRAZODONE HYDROCHLORIDE 50 MG: 50 TABLET ORAL at 21:31

## 2023-08-07 RX ADMIN — HALOPERIDOL 10 MG: 2 SOLUTION ORAL at 21:32

## 2023-08-07 RX ADMIN — LITHIUM CARBONATE 600 MG: 300 TABLET, FILM COATED, EXTENDED RELEASE ORAL at 21:31

## 2023-08-07 RX ADMIN — GLIPIZIDE 5 MG: 5 TABLET ORAL at 06:19

## 2023-08-07 RX ADMIN — ACETAMINOPHEN 650 MG: 325 TABLET ORAL at 12:59

## 2023-08-07 RX ADMIN — AMLODIPINE BESYLATE 5 MG: 5 TABLET ORAL at 08:25

## 2023-08-07 RX ADMIN — NITROFURANTOIN MACROCRYSTALS 50 MG: 50 CAPSULE ORAL at 08:25

## 2023-08-07 RX ADMIN — LITHIUM CARBONATE 600 MG: 300 TABLET, FILM COATED, EXTENDED RELEASE ORAL at 08:25

## 2023-08-07 RX ADMIN — HALOPERIDOL 12.5 MG: 2 SOLUTION ORAL at 08:26

## 2023-08-07 RX ADMIN — BENZTROPINE MESYLATE 1 MG: 1 TABLET ORAL at 21:31

## 2023-08-07 RX ADMIN — ATORVASTATIN CALCIUM 20 MG: 40 TABLET, FILM COATED ORAL at 08:25

## 2023-08-07 RX ADMIN — CLOZAPINE 200 MG: 100 TABLET ORAL at 21:32

## 2023-08-07 ASSESSMENT — PAIN SCALES - WONG BAKER
WONGBAKER_NUMERICALRESPONSE: 0
WONGBAKER_NUMERICALRESPONSE: 0

## 2023-08-07 ASSESSMENT — PAIN DESCRIPTION - DESCRIPTORS: DESCRIPTORS: ACHING

## 2023-08-07 ASSESSMENT — PAIN DESCRIPTION - LOCATION: LOCATION: BACK

## 2023-08-07 ASSESSMENT — PAIN SCALES - GENERAL
PAINLEVEL_OUTOF10: 3
PAINLEVEL_OUTOF10: 0
PAINLEVEL_OUTOF10: 0

## 2023-08-07 ASSESSMENT — PAIN DESCRIPTION - ORIENTATION: ORIENTATION: LOWER

## 2023-08-07 NOTE — PLAN OF CARE
Problem: Psychosis  Goal: Will report no hallucinations or delusions  Description: INTERVENTIONS:  1. Administer medication as  ordered  2. Assist with reality testing to support increasing orientation  3.  Assess if patient's hallucinations or delusions are encouraging self harm or harm to others and intervene as appropriate  Outcome: Progressing

## 2023-08-07 NOTE — CARE COORDINATION
08/07/23 0931   ITP   Date of Plan 08/07/23   Primary Diagnosis Code Schizophrenia   Short Term Goal 1   STG Goal 1 Status: Patient Appears to be  Progressing toward treatment plan goal   Short Term Goal 2   STG Goal 2 Status: Patient Appears to be  Progressing toward treatment plan goal   Crisis/Safety/Discharge Plan   Discharge [address] [facility]

## 2023-08-07 NOTE — GROUP NOTE
Group Therapy Note    Date: 8/7/2023    Group Start Time: 1400  Group End Time: 1500  Group Topic: Recreational    [facility] [place] [address] [facility] [place]    [address]        Group Therapy Note    Attendees:        Patient's Goal:  To concentrate on selected task    Notes:  Pt did not attend session    Discipline Responsible: Recreational Therapist      Signature:  Brian Wang

## 2023-08-08 LAB
GLUCOSE BLD STRIP.AUTO-MCNC: 181 MG/DL (ref 65–117)
GLUCOSE BLD STRIP.AUTO-MCNC: 220 MG/DL (ref 65–117)
SERVICE CMNT-IMP: ABNORMAL
SERVICE CMNT-IMP: ABNORMAL

## 2023-08-08 PROCEDURE — 82962 GLUCOSE BLOOD TEST: CPT

## 2023-08-08 PROCEDURE — 6370000000 HC RX 637 (ALT 250 FOR IP): Performed by: PSYCHIATRY & NEUROLOGY

## 2023-08-08 PROCEDURE — 6370000000 HC RX 637 (ALT 250 FOR IP)

## 2023-08-08 PROCEDURE — 99232 SBSQ HOSP IP/OBS MODERATE 35: CPT | Performed by: PSYCHIATRY & NEUROLOGY

## 2023-08-08 PROCEDURE — 1240000000 HC EMOTIONAL WELLNESS R&B

## 2023-08-08 RX ADMIN — HALOPERIDOL 10 MG: 2 SOLUTION ORAL at 08:27

## 2023-08-08 RX ADMIN — OXYBUTYNIN CHLORIDE 15 MG: 5 TABLET, EXTENDED RELEASE ORAL at 08:29

## 2023-08-08 RX ADMIN — CLOZAPINE 250 MG: 25 TABLET ORAL at 21:05

## 2023-08-08 RX ADMIN — LITHIUM CARBONATE 600 MG: 300 TABLET, FILM COATED, EXTENDED RELEASE ORAL at 21:05

## 2023-08-08 RX ADMIN — TRAZODONE HYDROCHLORIDE 50 MG: 50 TABLET ORAL at 21:06

## 2023-08-08 RX ADMIN — NITROFURANTOIN MACROCRYSTALS 50 MG: 50 CAPSULE ORAL at 08:29

## 2023-08-08 RX ADMIN — LITHIUM CARBONATE 600 MG: 300 TABLET, FILM COATED, EXTENDED RELEASE ORAL at 08:29

## 2023-08-08 RX ADMIN — GLIPIZIDE 5 MG: 5 TABLET ORAL at 05:51

## 2023-08-08 RX ADMIN — HALOPERIDOL 10 MG: 2 SOLUTION ORAL at 21:05

## 2023-08-08 RX ADMIN — BENZTROPINE MESYLATE 1 MG: 1 TABLET ORAL at 21:06

## 2023-08-08 RX ADMIN — AMLODIPINE BESYLATE 5 MG: 5 TABLET ORAL at 08:29

## 2023-08-08 RX ADMIN — ATORVASTATIN CALCIUM 20 MG: 40 TABLET, FILM COATED ORAL at 08:27

## 2023-08-08 NOTE — PLAN OF CARE
Problem: Anxiety  Goal: Will report anxiety at manageable levels  Description: INTERVENTIONS:  1. Administer medication as ordered  2. Teach and rehearse alternative coping skills  3.  Provide emotional support with 1:1 interaction with staff  Outcome: Morgan Palmer  Taken 8/8/2023 0847 by Syed Jett RN  Will report anxiety at manageable levels: Administer medication as ordered

## 2023-08-08 NOTE — GROUP NOTE
Group Therapy Note    Date: 8/8/2023    Group Start Time: 1400  Group End Time: 1500  Group Topic: Recreational    [facility] - [place] [address] ACUTE BEHAV HLTH    [address]        Group Therapy Note    Attendees:        Patient's Goal:  To concentrate on selected task    Notes:  Pleasant,cooperative elected to listen to music-did leave session early    Discipline Responsible: Recreational Therapist      Signature:  Shante Rai

## 2023-08-08 NOTE — GROUP NOTE
Group Therapy Note    Date: 8/8/2023    Group Start Time: 1000  Group End Time: 1100  Group Topic: Topic Group    [address] ACUTE BEHAV [address] [facility]        Group Therapy Note    Attendees:        Patient's Goal:  To participate in relaxation activity    Notes: Pleasant,cooperative-stayed entire session    Status After Intervention:  Improved    Participation Level: Interactive    Participation Quality: Attentive and Sharing      Speech:  normal      Affective Functioning: Congruent      Mood: euthymic      Level of consciousness:  Attentive      Response to Learning: Progressing to goal      Endings: None Reported    Modes of Intervention: Activity      Discipline Responsible: Recreational Therapist      Signature:  Harry Gray

## 2023-08-09 LAB
GLUCOSE BLD STRIP.AUTO-MCNC: 216 MG/DL (ref 65–117)
GLUCOSE BLD STRIP.AUTO-MCNC: 217 MG/DL (ref 65–117)
SERVICE CMNT-IMP: ABNORMAL
SERVICE CMNT-IMP: ABNORMAL

## 2023-08-09 PROCEDURE — 6370000000 HC RX 637 (ALT 250 FOR IP): Performed by: PSYCHIATRY & NEUROLOGY

## 2023-08-09 PROCEDURE — 82962 GLUCOSE BLOOD TEST: CPT

## 2023-08-09 PROCEDURE — 99232 SBSQ HOSP IP/OBS MODERATE 35: CPT | Performed by: PSYCHIATRY & NEUROLOGY

## 2023-08-09 PROCEDURE — 1240000000 HC EMOTIONAL WELLNESS R&B

## 2023-08-09 RX ORDER — AMLODIPINE BESYLATE 5 MG/1
10 TABLET ORAL DAILY
Status: DISCONTINUED | OUTPATIENT
Start: 2023-08-10 | End: 2023-08-10 | Stop reason: HOSPADM

## 2023-08-09 RX ADMIN — CLOZAPINE 300 MG: 100 TABLET ORAL at 21:16

## 2023-08-09 RX ADMIN — ATORVASTATIN CALCIUM 20 MG: 40 TABLET, FILM COATED ORAL at 08:46

## 2023-08-09 RX ADMIN — LITHIUM CARBONATE 600 MG: 300 TABLET, FILM COATED, EXTENDED RELEASE ORAL at 08:45

## 2023-08-09 RX ADMIN — HALOPERIDOL 10 MG: 2 SOLUTION ORAL at 21:16

## 2023-08-09 RX ADMIN — AMLODIPINE BESYLATE 5 MG: 5 TABLET ORAL at 08:46

## 2023-08-09 RX ADMIN — HALOPERIDOL 10 MG: 2 SOLUTION ORAL at 08:45

## 2023-08-09 RX ADMIN — NITROFURANTOIN MACROCRYSTALS 50 MG: 50 CAPSULE ORAL at 08:46

## 2023-08-09 RX ADMIN — BENZTROPINE MESYLATE 1 MG: 1 TABLET ORAL at 21:20

## 2023-08-09 RX ADMIN — LITHIUM CARBONATE 600 MG: 300 TABLET, FILM COATED, EXTENDED RELEASE ORAL at 21:16

## 2023-08-09 RX ADMIN — OXYBUTYNIN CHLORIDE 15 MG: 5 TABLET, EXTENDED RELEASE ORAL at 08:46

## 2023-08-09 RX ADMIN — GLIPIZIDE 5 MG: 5 TABLET ORAL at 06:49

## 2023-08-09 ASSESSMENT — PAIN SCALES - GENERAL: PAINLEVEL_OUTOF10: 0

## 2023-08-09 NOTE — GROUP NOTE
Group Therapy Note    Date: 8/9/2023    Group Start Time: 1400  Group End Time: 1500  Group Topic: Recreational    [facility] - [place] [address] ACUTE BEHAV HLTH    Charmaine Andersen        Group Therapy Note    Attendees:        Patient's Goal:  To concentrate on selected task  Notes:  Pt elected to listen to music    Status After Intervention:  Improved    Participation Level: Interactive    Participation Quality: Attentive      Affective Functioning: Congruent      Level of consciousness:  Attentive      Response to Learning: Progressing to goal      Endings: None Reported    Modes of Intervention: Socialization and Activity      Discipline Responsible: Recreational Therapist      Signature:  Reid Figueroa

## 2023-08-09 NOTE — GROUP NOTE
Group Therapy Note    Date: 8/9/2023    Group Start Time: 1000  Group End Time: 1100  Group Topic: Topic Group    [facility] [place] 3 ACUTE BEHAV HLTH    Charmaine Andersen        Group Therapy Note    Attendees:        Patient's Goal:  To participate in relaxation activity    Notes:  Pleasant,cooperative,active participant-at times,loose thoughts    Discipline Responsible: Recreational Therapist      Signature:  Tucker Seip

## 2023-08-10 VITALS
SYSTOLIC BLOOD PRESSURE: 138 MMHG | DIASTOLIC BLOOD PRESSURE: 85 MMHG | BODY MASS INDEX: 39.61 KG/M2 | TEMPERATURE: 98.4 F | HEIGHT: 64 IN | HEART RATE: 100 BPM | WEIGHT: 232 LBS | OXYGEN SATURATION: 100 % | RESPIRATION RATE: 21 BRPM

## 2023-08-10 LAB
BASOPHILS # BLD: 0.1 K/UL (ref 0–0.1)
BASOPHILS NFR BLD: 1 % (ref 0–1)
DIFFERENTIAL METHOD BLD: NORMAL
EOSINOPHIL # BLD: 0.2 K/UL (ref 0–0.4)
EOSINOPHIL NFR BLD: 2 % (ref 0–7)
ERYTHROCYTE [DISTWIDTH] IN BLOOD BY AUTOMATED COUNT: 12.7 % (ref 11.5–14.5)
GLUCOSE BLD STRIP.AUTO-MCNC: 190 MG/DL (ref 65–117)
HCT VFR BLD AUTO: 37.6 % (ref 35–47)
HGB BLD-MCNC: 12.7 G/DL (ref 11.5–16)
IMM GRANULOCYTES # BLD AUTO: 0 K/UL (ref 0–0.04)
IMM GRANULOCYTES NFR BLD AUTO: 0 % (ref 0–0.5)
LYMPHOCYTES # BLD: 3.4 K/UL (ref 0.8–3.5)
LYMPHOCYTES NFR BLD: 32 % (ref 12–49)
MCH RBC QN AUTO: 31.1 PG (ref 26–34)
MCHC RBC AUTO-ENTMCNC: 33.8 G/DL (ref 30–36.5)
MCV RBC AUTO: 92.2 FL (ref 80–99)
MONOCYTES # BLD: 0.5 K/UL (ref 0–1)
MONOCYTES NFR BLD: 5 % (ref 5–13)
NEUTS SEG # BLD: 6.4 K/UL (ref 1.8–8)
NEUTS SEG NFR BLD: 60 % (ref 32–75)
NRBC # BLD: 0 K/UL (ref 0–0.01)
NRBC BLD-RTO: 0 PER 100 WBC
PLATELET # BLD AUTO: 173 K/UL (ref 150–400)
PLATELET COMMENT: NORMAL
PMV BLD AUTO: 11.4 FL (ref 8.9–12.9)
RBC # BLD AUTO: 4.08 M/UL (ref 3.8–5.2)
RBC MORPH BLD: NORMAL
SERVICE CMNT-IMP: ABNORMAL
WBC # BLD AUTO: 10.6 K/UL (ref 3.6–11)

## 2023-08-10 PROCEDURE — 36415 COLL VENOUS BLD VENIPUNCTURE: CPT

## 2023-08-10 PROCEDURE — 6370000000 HC RX 637 (ALT 250 FOR IP): Performed by: PSYCHIATRY & NEUROLOGY

## 2023-08-10 PROCEDURE — 99239 HOSP IP/OBS DSCHRG MGMT >30: CPT | Performed by: PSYCHIATRY & NEUROLOGY

## 2023-08-10 PROCEDURE — 85025 COMPLETE CBC W/AUTO DIFF WBC: CPT

## 2023-08-10 PROCEDURE — 82962 GLUCOSE BLOOD TEST: CPT

## 2023-08-10 RX ORDER — LITHIUM CARBONATE 300 MG/1
600 TABLET, FILM COATED, EXTENDED RELEASE ORAL EVERY 12 HOURS SCHEDULED
Qty: 120 TABLET | Refills: 1 | Status: SHIPPED | OUTPATIENT
Start: 2023-08-10

## 2023-08-10 RX ORDER — AMLODIPINE BESYLATE 10 MG/1
10 TABLET ORAL DAILY
Qty: 30 TABLET | Refills: 1 | Status: SHIPPED | OUTPATIENT
Start: 2023-08-10

## 2023-08-10 RX ORDER — HALOPERIDOL 10 MG/1
10 TABLET ORAL 2 TIMES DAILY
Qty: 40 TABLET | Refills: 0 | Status: SHIPPED | OUTPATIENT
Start: 2023-08-10

## 2023-08-10 RX ORDER — CLOZAPINE 100 MG/1
300 TABLET ORAL NIGHTLY
Qty: 21 TABLET | Refills: 0 | Status: SHIPPED | OUTPATIENT
Start: 2023-08-10

## 2023-08-10 RX ADMIN — GLIPIZIDE 5 MG: 5 TABLET ORAL at 07:37

## 2023-08-10 RX ADMIN — HALOPERIDOL 10 MG: 2 SOLUTION ORAL at 08:46

## 2023-08-10 RX ADMIN — ATORVASTATIN CALCIUM 20 MG: 40 TABLET, FILM COATED ORAL at 08:46

## 2023-08-10 RX ADMIN — LITHIUM CARBONATE 600 MG: 300 TABLET, FILM COATED, EXTENDED RELEASE ORAL at 08:46

## 2023-08-10 RX ADMIN — AMLODIPINE BESYLATE 10 MG: 5 TABLET ORAL at 08:46

## 2023-08-10 RX ADMIN — NITROFURANTOIN MACROCRYSTALS 50 MG: 50 CAPSULE ORAL at 08:45

## 2023-08-10 RX ADMIN — OXYBUTYNIN CHLORIDE 15 MG: 5 TABLET, EXTENDED RELEASE ORAL at 08:45

## 2023-08-10 NOTE — GROUP NOTE
Group Therapy Note    Date: 8/10/2023    Group Start Time: 6552  Group End Time: 7378  Group Topic: Community Meeting    [facility] [place] [address] [facility], [address]; [facility]        Group Therapy Note    Attendees: 3       Notes:  did not attend           Signature:  Joan Carlson

## 2023-08-10 NOTE — PLAN OF CARE
0800 Patient walking in the hallway. Cooperative and compliant with assessment. Denied any depression, SI, HI, A/V hallucinations or anxiety. No complain of pain. Took all her morning medications. Vital signs within normal parameters. Blood specimen collected from right antecubital vein to test CBC. Will continue to monitor.      Problem: Discharge Planning  Goal: Discharge to home or other facility with appropriate resources  Outcome: Progressing

## 2023-08-10 NOTE — GROUP NOTE
Group Therapy Note    Date: 8/10/2023    Group Start Time: 1000  Group End Time: 1100  Group Topic: Topic Group    [facility] [place] [facility] ACUTE BEH[facility] HLTH    [address]        Group Therapy Note    Attendees:        Patient's Goal:  To identify positive coping strategies a-z       Status After Intervention:  Improved    Participation Level: Interactive    Participation Quality: Attentive and Sharing      Speech:  normal      Thought Process/Content: Logical      Affective Functioning: Congruent      Mood: euthymic      Level of consciousness:  Attentive      Response to Learning: Progressing to goal      Endings: None Reported    Modes of Intervention: Education      Discipline Responsible: Recreational Therapist      Signature:  Rossy Humphreys

## 2023-08-10 NOTE — DISCHARGE INSTRUCTIONS
DISCHARGE SUMMARY    Gómez Schwartz  : 1960  MRN: [id number]    The patient Quincy Angolan exhibits the ability to control behavior in a less restrictive environment. Patient's level of functioning is improving. No assaultive/destructive behavior has been observed for the past 24 hours. No suicidal/homicidal threat or behavior has been observed for the past 24 hours. There is no evidence of serious medication side effects. Patient has not been in physical or protective restraints for at least the past 24 hours. If weapons involved, how are they secured? None    Is patient aware of and in agreement with discharge plan? Yes    Arrangements for medication:  Prescriptions sent    Copy of discharge instructions to provider?:  Yes    Arrangements for transportation home:  ACT Team    Keep all follow up appointments as scheduled, continue to take prescribed medications per physician instructions. Mental health crisis number:  726 or your local mental health crisis line number at [phone].        Mental Health Emergency WARM LINE      [phone] ([phone])      M-F: 9am to 9pm      Sat & Sun: 5pm - 9pm  National suicide prevention lines:                             0-409-IBCMCGX ([phone])       6-690-581-TALK ([phone])    Crisis Text Line:  Text HOME to 312184

## 2023-08-10 NOTE — CARE COORDINATION
08/10/23 0910   ITP   Date of Plan 08/10/23   Primary Diagnosis Code Schizophrenia   Short Term Goal 1   STG Goal 1 Status: Patient Appears to be    (Goal met, discharging)   Short Term Goal 2   STG Goal 2 Status: Patient Appears to be    (Goal met, discharging)   Crisis/Safety/Discharge Plan   Discharge Plan Home with ACT team     SERGIO Umanzor

## 2023-08-10 NOTE — DISCHARGE SUMMARY
NORMOCHROMIC    Final    POC Glucose 08/10/2023 190 (H)  65 - 117 mg/dL Final    Performed by: 08/10/2023 Wero Valverde RN   Final     No results found. DISPOSITION:    Home. Patient to f/u with psychiatric and psychotherapy appointments. Patient is to f/u with all outpatient treatment as directed, including psychiatric, medical, and social appointments. FOLLOW-UP CARE:    Follow-up Information       Follow up With Specialties Details Why Contact Info    Monthly injection  Follow up on 8/28/2023 Haloperidol decanoate 250 mg injection was given on July 31st.  Your next injection is due on August 28th. ACT Team Follow Up  Follow up You will be going home with Lawson Newton. Your ACT team will arrange follow up with your psychiatrist and therapy team, as well as your ME[facility]ER. [address], [address], [address] [facility]  [phone]    [facility] Crisis  Follow up Call as needed for mental health crisis. [phone]    Weekly labs for clozapine  Follow up on 8/17/2023 Your weekly labs for clozapine are due on August 17th. Your last [place] was 6400 on August 10th. PROGNOSIS:   Fair ---- based on nature of patient's pathology/ies and treatment compliance issues. Prognosis is greatly dependent upon patient's ability to remain sober and to follow up with scheduled appointments as well as to comply with psychiatric medications as prescribed.           DISCHARGE MEDICATIONS:     Informed consent given for the use of following psychotropic medications:     Medication List        START taking these medications      amLODIPine 10 MG tablet  Commonly known as: NORVASC  Take 1 tablet by mouth daily     cloZAPine 100 MG tablet  Commonly known as: CLOZARIL  Take 3 tablets by mouth nightly     lithium 300 MG extended release tablet  Commonly known as: LITHOBID  Take 2 tablets by mouth every 12 hours            CHANGE how you take these medications      haloperidol 10 MG tablet  Commonly known as:

## 2023-08-18 ENCOUNTER — APPOINTMENT (OUTPATIENT)
Facility: HOSPITAL | Age: 63
DRG: 872 | End: 2023-08-18
Payer: MEDICARE

## 2023-08-18 ENCOUNTER — HOSPITAL ENCOUNTER (INPATIENT)
Facility: HOSPITAL | Age: 63
LOS: 11 days | Discharge: HOME | DRG: 872 | End: 2023-08-29
Attending: STUDENT IN AN ORGANIZED HEALTH CARE EDUCATION/TRAINING PROGRAM | Admitting: STUDENT IN AN ORGANIZED HEALTH CARE EDUCATION/TRAINING PROGRAM
Payer: MEDICARE

## 2023-08-18 DIAGNOSIS — E87.6 HYPOKALEMIA: ICD-10-CM

## 2023-08-18 DIAGNOSIS — K52.9 COLITIS: Primary | ICD-10-CM

## 2023-08-18 DIAGNOSIS — R78.81 BACTEREMIA: ICD-10-CM

## 2023-08-18 PROBLEM — A41.9 SEPSIS (HCC): Status: ACTIVE | Noted: 2023-08-18

## 2023-08-18 LAB
ALBUMIN SERPL-MCNC: 3.1 G/DL (ref 3.5–5)
ALBUMIN/GLOB SERPL: 0.8 (ref 1.1–2.2)
ALP SERPL-CCNC: 172 U/L (ref 45–117)
ALT SERPL-CCNC: 30 U/L (ref 12–78)
ANION GAP SERPL CALC-SCNC: 5 MMOL/L (ref 5–15)
APPEARANCE UR: CLEAR
AST SERPL-CCNC: 20 U/L (ref 15–37)
BACTERIA URNS QL MICRO: NEGATIVE /HPF
BASOPHILS # BLD: 0.1 K/UL (ref 0–0.1)
BASOPHILS NFR BLD: 0 % (ref 0–1)
BILIRUB SERPL-MCNC: 0.5 MG/DL (ref 0.2–1)
BILIRUB UR QL: NEGATIVE
BUN SERPL-MCNC: 13 MG/DL (ref 6–20)
BUN/CREAT SERPL: 14 (ref 12–20)
CALCIUM SERPL-MCNC: 8.9 MG/DL (ref 8.5–10.1)
CHLORIDE SERPL-SCNC: 102 MMOL/L (ref 97–108)
CO2 SERPL-SCNC: 30 MMOL/L (ref 21–32)
COLOR UR: ABNORMAL
CREAT SERPL-MCNC: 0.95 MG/DL (ref 0.55–1.02)
DIFFERENTIAL METHOD BLD: ABNORMAL
EOSINOPHIL # BLD: 0.2 K/UL (ref 0–0.4)
EOSINOPHIL NFR BLD: 1 % (ref 0–7)
EPITH CASTS URNS QL MICRO: ABNORMAL /LPF
ERYTHROCYTE [DISTWIDTH] IN BLOOD BY AUTOMATED COUNT: 12.7 % (ref 11.5–14.5)
GLOBULIN SER CALC-MCNC: 3.8 G/DL (ref 2–4)
GLUCOSE SERPL-MCNC: 214 MG/DL (ref 65–100)
GLUCOSE UR STRIP.AUTO-MCNC: 250 MG/DL
HCT VFR BLD AUTO: 39 % (ref 35–47)
HGB BLD-MCNC: 13.4 G/DL (ref 11.5–16)
HGB UR QL STRIP: NEGATIVE
IMM GRANULOCYTES # BLD AUTO: 0.2 K/UL (ref 0–0.04)
IMM GRANULOCYTES NFR BLD AUTO: 1 % (ref 0–0.5)
KETONES UR QL STRIP.AUTO: NEGATIVE MG/DL
LACTATE SERPL-SCNC: 1.6 MMOL/L (ref 0.4–2)
LEUKOCYTE ESTERASE UR QL STRIP.AUTO: NEGATIVE
LIPASE SERPL-CCNC: 19 U/L (ref 73–393)
LYMPHOCYTES # BLD: 2.5 K/UL (ref 0.8–3.5)
LYMPHOCYTES NFR BLD: 13 % (ref 12–49)
MAGNESIUM SERPL-MCNC: 2.1 MG/DL (ref 1.6–2.4)
MCH RBC QN AUTO: 31.2 PG (ref 26–34)
MCHC RBC AUTO-ENTMCNC: 34.4 G/DL (ref 30–36.5)
MCV RBC AUTO: 90.7 FL (ref 80–99)
MONOCYTES # BLD: 0.9 K/UL (ref 0–1)
MONOCYTES NFR BLD: 5 % (ref 5–13)
NEUTS SEG # BLD: 14.9 K/UL (ref 1.8–8)
NEUTS SEG NFR BLD: 80 % (ref 32–75)
NITRITE UR QL STRIP.AUTO: NEGATIVE
NRBC # BLD: 0 K/UL (ref 0–0.01)
NRBC BLD-RTO: 0 PER 100 WBC
PH UR STRIP: 7 (ref 5–8)
PLATELET # BLD AUTO: 371 K/UL (ref 150–400)
PMV BLD AUTO: 10.4 FL (ref 8.9–12.9)
POTASSIUM SERPL-SCNC: 2.7 MMOL/L (ref 3.5–5.1)
PROT SERPL-MCNC: 6.9 G/DL (ref 6.4–8.2)
PROT UR STRIP-MCNC: NEGATIVE MG/DL
RBC # BLD AUTO: 4.3 M/UL (ref 3.8–5.2)
RBC #/AREA URNS HPF: ABNORMAL /HPF (ref 0–5)
SODIUM SERPL-SCNC: 137 MMOL/L (ref 136–145)
SP GR UR REFRACTOMETRY: <1.005
TROPONIN I SERPL HS-MCNC: 6 NG/L (ref 0–51)
URINE CULTURE IF INDICATED: ABNORMAL
UROBILINOGEN UR QL STRIP.AUTO: 1 EU/DL (ref 0.2–1)
WBC # BLD AUTO: 18.8 K/UL (ref 3.6–11)
WBC URNS QL MICRO: ABNORMAL /HPF (ref 0–4)

## 2023-08-18 PROCEDURE — 36415 COLL VENOUS BLD VENIPUNCTURE: CPT

## 2023-08-18 PROCEDURE — 96361 HYDRATE IV INFUSION ADD-ON: CPT

## 2023-08-18 PROCEDURE — 2580000003 HC RX 258: Performed by: NURSE PRACTITIONER

## 2023-08-18 PROCEDURE — 74176 CT ABD & PELVIS W/O CONTRAST: CPT

## 2023-08-18 PROCEDURE — 96366 THER/PROPH/DIAG IV INF ADDON: CPT

## 2023-08-18 PROCEDURE — 80053 COMPREHEN METABOLIC PANEL: CPT

## 2023-08-18 PROCEDURE — 96375 TX/PRO/DX INJ NEW DRUG ADDON: CPT

## 2023-08-18 PROCEDURE — 71045 X-RAY EXAM CHEST 1 VIEW: CPT

## 2023-08-18 PROCEDURE — 83690 ASSAY OF LIPASE: CPT

## 2023-08-18 PROCEDURE — 96360 HYDRATION IV INFUSION INIT: CPT

## 2023-08-18 PROCEDURE — 83605 ASSAY OF LACTIC ACID: CPT

## 2023-08-18 PROCEDURE — 99285 EMERGENCY DEPT VISIT HI MDM: CPT

## 2023-08-18 PROCEDURE — 81001 URINALYSIS AUTO W/SCOPE: CPT

## 2023-08-18 PROCEDURE — 84145 PROCALCITONIN (PCT): CPT

## 2023-08-18 PROCEDURE — 1200000000 HC SEMI PRIVATE

## 2023-08-18 PROCEDURE — 83735 ASSAY OF MAGNESIUM: CPT

## 2023-08-18 PROCEDURE — 85025 COMPLETE CBC W/AUTO DIFF WBC: CPT

## 2023-08-18 PROCEDURE — 87040 BLOOD CULTURE FOR BACTERIA: CPT

## 2023-08-18 PROCEDURE — 6360000002 HC RX W HCPCS: Performed by: NURSE PRACTITIONER

## 2023-08-18 PROCEDURE — 84484 ASSAY OF TROPONIN QUANT: CPT

## 2023-08-18 PROCEDURE — 51701 INSERT BLADDER CATHETER: CPT

## 2023-08-18 PROCEDURE — 87150 DNA/RNA AMPLIFIED PROBE: CPT

## 2023-08-18 PROCEDURE — 1100000000 HC RM PRIVATE

## 2023-08-18 PROCEDURE — 87186 SC STD MICRODIL/AGAR DIL: CPT

## 2023-08-18 PROCEDURE — 6370000000 HC RX 637 (ALT 250 FOR IP): Performed by: NURSE PRACTITIONER

## 2023-08-18 PROCEDURE — 96365 THER/PROPH/DIAG IV INF INIT: CPT

## 2023-08-18 PROCEDURE — 87077 CULTURE AEROBIC IDENTIFY: CPT

## 2023-08-18 RX ORDER — 0.9 % SODIUM CHLORIDE 0.9 %
30 INTRAVENOUS SOLUTION INTRAVENOUS ONCE
Status: COMPLETED | OUTPATIENT
Start: 2023-08-18 | End: 2023-08-19

## 2023-08-18 RX ORDER — POTASSIUM CHLORIDE 750 MG/1
40 TABLET, FILM COATED, EXTENDED RELEASE ORAL ONCE
Status: COMPLETED | OUTPATIENT
Start: 2023-08-18 | End: 2023-08-18

## 2023-08-18 RX ORDER — 0.9 % SODIUM CHLORIDE 0.9 %
30 INTRAVENOUS SOLUTION INTRAVENOUS ONCE
Status: DISCONTINUED | OUTPATIENT
Start: 2023-08-18 | End: 2023-08-18

## 2023-08-18 RX ORDER — POTASSIUM CHLORIDE 7.45 MG/ML
10 INJECTION INTRAVENOUS ONCE
Status: COMPLETED | OUTPATIENT
Start: 2023-08-18 | End: 2023-08-18

## 2023-08-18 RX ORDER — 0.9 % SODIUM CHLORIDE 0.9 %
1000 INTRAVENOUS SOLUTION INTRAVENOUS ONCE
Status: COMPLETED | OUTPATIENT
Start: 2023-08-18 | End: 2023-08-19

## 2023-08-18 RX ORDER — LEVOFLOXACIN 5 MG/ML
750 INJECTION, SOLUTION INTRAVENOUS ONCE
Status: COMPLETED | OUTPATIENT
Start: 2023-08-18 | End: 2023-08-18

## 2023-08-18 RX ADMIN — POTASSIUM CHLORIDE 40 MEQ: 750 TABLET, EXTENDED RELEASE ORAL at 21:37

## 2023-08-18 RX ADMIN — POTASSIUM CHLORIDE 10 MEQ: 7.46 INJECTION, SOLUTION INTRAVENOUS at 21:39

## 2023-08-18 RX ADMIN — LEVOFLOXACIN 750 MG: 5 INJECTION, SOLUTION INTRAVENOUS at 21:37

## 2023-08-18 RX ADMIN — SODIUM CHLORIDE 2247 ML: 9 INJECTION, SOLUTION INTRAVENOUS at 21:38

## 2023-08-18 RX ADMIN — SODIUM CHLORIDE 1000 ML: 9 INJECTION, SOLUTION INTRAVENOUS at 20:18

## 2023-08-18 ASSESSMENT — ENCOUNTER SYMPTOMS
VOMITING: 0
NAUSEA: 0
ABDOMINAL PAIN: 1
SHORTNESS OF BREATH: 0
BACK PAIN: 0
DIARRHEA: 1

## 2023-08-18 ASSESSMENT — PAIN - FUNCTIONAL ASSESSMENT: PAIN_FUNCTIONAL_ASSESSMENT: NONE - DENIES PAIN

## 2023-08-18 NOTE — ED TRIAGE NOTES
Patient presents to ED with c/o diarrhea related to her lithium.  Patient states that she was told today by her psychiatrist not to take anymore lithium do to her levels being elevated

## 2023-08-19 LAB
DATE LAST DOSE: NORMAL
DOSE AMOUNT: NORMAL UNITS
DOSE DATE/TIME: NORMAL
EST. AVERAGE GLUCOSE BLD GHB EST-MCNC: 151 MG/DL
GLUCOSE BLD STRIP.AUTO-MCNC: 130 MG/DL (ref 65–117)
GLUCOSE BLD STRIP.AUTO-MCNC: 151 MG/DL (ref 65–117)
GLUCOSE BLD STRIP.AUTO-MCNC: 197 MG/DL (ref 65–117)
GLUCOSE BLD STRIP.AUTO-MCNC: 198 MG/DL (ref 65–117)
GLUCOSE BLD STRIP.AUTO-MCNC: 211 MG/DL (ref 65–117)
HBA1C MFR BLD: 6.9 % (ref 4–5.6)
LITHIUM SERPL-SCNC: 0.71 MMOL/L (ref 0.6–1.2)
PROCALCITONIN SERPL-MCNC: 0.17 NG/ML
SERVICE CMNT-IMP: ABNORMAL

## 2023-08-19 PROCEDURE — 83036 HEMOGLOBIN GLYCOSYLATED A1C: CPT

## 2023-08-19 PROCEDURE — 6370000000 HC RX 637 (ALT 250 FOR IP): Performed by: STUDENT IN AN ORGANIZED HEALTH CARE EDUCATION/TRAINING PROGRAM

## 2023-08-19 PROCEDURE — 80178 ASSAY OF LITHIUM: CPT

## 2023-08-19 PROCEDURE — 82962 GLUCOSE BLOOD TEST: CPT

## 2023-08-19 PROCEDURE — 2580000003 HC RX 258: Performed by: STUDENT IN AN ORGANIZED HEALTH CARE EDUCATION/TRAINING PROGRAM

## 2023-08-19 PROCEDURE — 6360000002 HC RX W HCPCS: Performed by: STUDENT IN AN ORGANIZED HEALTH CARE EDUCATION/TRAINING PROGRAM

## 2023-08-19 PROCEDURE — 1200000000 HC SEMI PRIVATE

## 2023-08-19 PROCEDURE — 36415 COLL VENOUS BLD VENIPUNCTURE: CPT

## 2023-08-19 RX ORDER — METRONIDAZOLE 500 MG/1
500 TABLET ORAL EVERY 8 HOURS SCHEDULED
Status: COMPLETED | OUTPATIENT
Start: 2023-08-19 | End: 2023-08-29

## 2023-08-19 RX ORDER — LEVOFLOXACIN 750 MG/1
750 TABLET ORAL
Status: COMPLETED | OUTPATIENT
Start: 2023-08-19 | End: 2023-08-27

## 2023-08-19 RX ORDER — AMLODIPINE BESYLATE 5 MG/1
10 TABLET ORAL DAILY
Status: DISCONTINUED | OUTPATIENT
Start: 2023-08-19 | End: 2023-08-29 | Stop reason: HOSPADM

## 2023-08-19 RX ORDER — INSULIN LISPRO 100 [IU]/ML
0-4 INJECTION, SOLUTION INTRAVENOUS; SUBCUTANEOUS NIGHTLY
Status: DISCONTINUED | OUTPATIENT
Start: 2023-08-19 | End: 2023-08-29 | Stop reason: HOSPADM

## 2023-08-19 RX ORDER — INSULIN LISPRO 100 [IU]/ML
0-4 INJECTION, SOLUTION INTRAVENOUS; SUBCUTANEOUS
Status: DISCONTINUED | OUTPATIENT
Start: 2023-08-19 | End: 2023-08-29 | Stop reason: HOSPADM

## 2023-08-19 RX ORDER — LEVOFLOXACIN 5 MG/ML
750 INJECTION, SOLUTION INTRAVENOUS EVERY 24 HOURS
Status: DISCONTINUED | OUTPATIENT
Start: 2023-08-19 | End: 2023-08-19

## 2023-08-19 RX ORDER — SODIUM CHLORIDE 0.9 % (FLUSH) 0.9 %
5-40 SYRINGE (ML) INJECTION PRN
Status: DISCONTINUED | OUTPATIENT
Start: 2023-08-19 | End: 2023-08-29 | Stop reason: HOSPADM

## 2023-08-19 RX ORDER — SODIUM CHLORIDE 9 MG/ML
INJECTION, SOLUTION INTRAVENOUS CONTINUOUS
Status: DISCONTINUED | OUTPATIENT
Start: 2023-08-19 | End: 2023-08-19

## 2023-08-19 RX ORDER — INSULIN GLARGINE 100 [IU]/ML
15 INJECTION, SOLUTION SUBCUTANEOUS NIGHTLY
Status: DISCONTINUED | OUTPATIENT
Start: 2023-08-19 | End: 2023-08-29 | Stop reason: HOSPADM

## 2023-08-19 RX ORDER — DEXTROSE MONOHYDRATE 100 MG/ML
INJECTION, SOLUTION INTRAVENOUS CONTINUOUS PRN
Status: DISCONTINUED | OUTPATIENT
Start: 2023-08-19 | End: 2023-08-29 | Stop reason: HOSPADM

## 2023-08-19 RX ORDER — ATORVASTATIN CALCIUM 10 MG/1
20 TABLET, FILM COATED ORAL DAILY
Status: DISCONTINUED | OUTPATIENT
Start: 2023-08-19 | End: 2023-08-29 | Stop reason: HOSPADM

## 2023-08-19 RX ORDER — SODIUM CHLORIDE 0.9 % (FLUSH) 0.9 %
5-40 SYRINGE (ML) INJECTION EVERY 12 HOURS SCHEDULED
Status: DISCONTINUED | OUTPATIENT
Start: 2023-08-19 | End: 2023-08-29 | Stop reason: HOSPADM

## 2023-08-19 RX ORDER — METRONIDAZOLE 500 MG/100ML
500 INJECTION, SOLUTION INTRAVENOUS EVERY 8 HOURS
Status: DISCONTINUED | OUTPATIENT
Start: 2023-08-19 | End: 2023-08-19

## 2023-08-19 RX ORDER — BENZTROPINE MESYLATE 1 MG/1
1 TABLET ORAL
Status: DISCONTINUED | OUTPATIENT
Start: 2023-08-19 | End: 2023-08-29 | Stop reason: HOSPADM

## 2023-08-19 RX ORDER — SODIUM CHLORIDE 9 MG/ML
INJECTION, SOLUTION INTRAVENOUS PRN
Status: DISCONTINUED | OUTPATIENT
Start: 2023-08-19 | End: 2023-08-29 | Stop reason: HOSPADM

## 2023-08-19 RX ORDER — ENOXAPARIN SODIUM 100 MG/ML
30 INJECTION SUBCUTANEOUS 2 TIMES DAILY
Status: DISCONTINUED | OUTPATIENT
Start: 2023-08-19 | End: 2023-08-29 | Stop reason: HOSPADM

## 2023-08-19 RX ORDER — ACETAMINOPHEN 325 MG/1
650 TABLET ORAL EVERY 6 HOURS PRN
Status: DISCONTINUED | OUTPATIENT
Start: 2023-08-19 | End: 2023-08-29 | Stop reason: HOSPADM

## 2023-08-19 RX ORDER — HALOPERIDOL 5 MG/1
10 TABLET ORAL 2 TIMES DAILY
Status: DISCONTINUED | OUTPATIENT
Start: 2023-08-19 | End: 2023-08-28

## 2023-08-19 RX ORDER — ACETAMINOPHEN 650 MG/1
650 SUPPOSITORY RECTAL EVERY 6 HOURS PRN
Status: DISCONTINUED | OUTPATIENT
Start: 2023-08-19 | End: 2023-08-29 | Stop reason: HOSPADM

## 2023-08-19 RX ORDER — ONDANSETRON 4 MG/1
4 TABLET, ORALLY DISINTEGRATING ORAL EVERY 8 HOURS PRN
Status: DISCONTINUED | OUTPATIENT
Start: 2023-08-19 | End: 2023-08-29 | Stop reason: HOSPADM

## 2023-08-19 RX ORDER — POLYETHYLENE GLYCOL 3350 17 G/17G
17 POWDER, FOR SOLUTION ORAL DAILY PRN
Status: DISCONTINUED | OUTPATIENT
Start: 2023-08-19 | End: 2023-08-29 | Stop reason: HOSPADM

## 2023-08-19 RX ORDER — OXYBUTYNIN CHLORIDE 5 MG/1
15 TABLET, EXTENDED RELEASE ORAL DAILY
Status: DISCONTINUED | OUTPATIENT
Start: 2023-08-19 | End: 2023-08-29 | Stop reason: HOSPADM

## 2023-08-19 RX ORDER — ONDANSETRON 2 MG/ML
4 INJECTION INTRAMUSCULAR; INTRAVENOUS EVERY 6 HOURS PRN
Status: DISCONTINUED | OUTPATIENT
Start: 2023-08-19 | End: 2023-08-29 | Stop reason: HOSPADM

## 2023-08-19 RX ORDER — CLOZAPINE 100 MG/1
300 TABLET ORAL NIGHTLY
Status: DISCONTINUED | OUTPATIENT
Start: 2023-08-20 | End: 2023-08-29 | Stop reason: HOSPADM

## 2023-08-19 RX ADMIN — HALOPERIDOL 10 MG: 5 TABLET ORAL at 00:54

## 2023-08-19 RX ADMIN — METRONIDAZOLE 500 MG: 500 INJECTION, SOLUTION INTRAVENOUS at 00:55

## 2023-08-19 RX ADMIN — SODIUM CHLORIDE: 9 INJECTION, SOLUTION INTRAVENOUS at 03:44

## 2023-08-19 RX ADMIN — SODIUM CHLORIDE, PRESERVATIVE FREE 20 ML: 5 INJECTION INTRAVENOUS at 22:01

## 2023-08-19 RX ADMIN — BENZTROPINE MESYLATE 1 MG: 1 TABLET ORAL at 00:54

## 2023-08-19 RX ADMIN — ATORVASTATIN CALCIUM 20 MG: 10 TABLET, FILM COATED ORAL at 08:35

## 2023-08-19 RX ADMIN — OXYBUTYNIN CHLORIDE 15 MG: 5 TABLET, EXTENDED RELEASE ORAL at 08:31

## 2023-08-19 RX ADMIN — METRONIDAZOLE 500 MG: 500 INJECTION, SOLUTION INTRAVENOUS at 08:36

## 2023-08-19 RX ADMIN — METRONIDAZOLE 500 MG: 500 TABLET ORAL at 14:44

## 2023-08-19 RX ADMIN — HALOPERIDOL 10 MG: 5 TABLET ORAL at 22:00

## 2023-08-19 RX ADMIN — BENZTROPINE MESYLATE 1 MG: 1 TABLET ORAL at 22:00

## 2023-08-19 RX ADMIN — INSULIN GLARGINE 15 UNITS: 100 INJECTION, SOLUTION SUBCUTANEOUS at 22:00

## 2023-08-19 RX ADMIN — LEVOFLOXACIN 750 MG: 750 TABLET, FILM COATED ORAL at 13:01

## 2023-08-19 RX ADMIN — HALOPERIDOL 10 MG: 5 TABLET ORAL at 08:35

## 2023-08-19 RX ADMIN — AMLODIPINE BESYLATE 10 MG: 5 TABLET ORAL at 08:35

## 2023-08-19 RX ADMIN — METRONIDAZOLE 500 MG: 500 TABLET ORAL at 22:00

## 2023-08-19 RX ADMIN — INSULIN GLARGINE 15 UNITS: 100 INJECTION, SOLUTION SUBCUTANEOUS at 00:55

## 2023-08-19 ASSESSMENT — PAIN SCALES - GENERAL
PAINLEVEL_OUTOF10: 0
PAINLEVEL_OUTOF10: 0

## 2023-08-19 NOTE — PLAN OF CARE
Problem: Discharge Planning  Goal: Discharge to home or other facility with appropriate resources  Outcome: Progressing  Flowsheets (Taken 8/19/2023 6430)  Discharge to home or other facility with appropriate resources: Identify barriers to discharge with patient and caregiver     Problem: Safety - Adult  Goal: Free from fall injury  Outcome: Progressing

## 2023-08-19 NOTE — H&P
History & Physical    Primary Care Provider: Eusebia Henderson MD  Source of Information: Patient and chart review    History of Presenting Illness:   Sofy Navarro is a 66 y.o. female with history of bipolar and schizoaffective disorder, type 2 diabetes, generalized anxiety disorder, morbid obesity who presents to hospital with complaints of diarrhea. Symptom onset was about 24 hours ago when she describes near constant, loose bowel movements. No associated melena or hematochezia. Additionally, she said she had single episode of vaginal bleeding which seemed like a period.-Since resolved. The patient denies any fever, chills, chest or abdominal pain, nausea, vomiting, cough, congestion, recent illness, palpitations, or dysuria. Remarkable vitals on ER Presentation: Heart rate 105  Labs Remarkable for: Potassium 3.7, glucose 214, WBC 18.8  ER Images: Slight inflammatory changes in the sigmoid mesocolon may be related to mild underlying colitis. ER Rx: 3.2 L normal saline bolus, Levaquin, potassium 50 mEq     Review of Systems:  Pertinent items are noted in the History of Present Illness. Past Medical History:   Diagnosis Date    Anxiety     Bipolar disorder ([facility])     High cholesterol     Psychiatric disorder     Bipolar    Schizoaffective disorder ([facility])     Type 2 diabetes mellitus ([facility])     No medication at this time      Past Surgical History:   Procedure Laterality Date    CHOLECYSTECTOMY  03/03/2017    gregory [id number]. Venida Cutting    COLONOSCOPY      COLONOSCOPY N/A 7/6/2022    COLONOSCOPY performed by Marcel Miranda MD at [address] [facility]     Prior to Admission medications    Medication Sig Start Date End Date Taking?  Authorizing Provider   cloZAPine (CLOZARIL) 100 MG tablet Take 3 tablets by mouth nightly 8/10/23   Satya Peña MD   lithium (LITHOBID) 300 MG extended release tablet Take 2 tablets by mouth every 12 hours 8/10/23   Satya Peña MD

## 2023-08-20 LAB
ACCESSION NUMBER, LLC1M: ABNORMAL
ACINETOBACTER CALCOAC BAUMANNII COMPLEX BY PCR: NOT DETECTED
ALBUMIN SERPL-MCNC: 2.6 G/DL (ref 3.5–5)
ALBUMIN/GLOB SERPL: 0.7 (ref 1.1–2.2)
ALP SERPL-CCNC: 149 U/L (ref 45–117)
ALT SERPL-CCNC: 24 U/L (ref 12–78)
ANION GAP SERPL CALC-SCNC: 10 MMOL/L (ref 5–15)
AST SERPL-CCNC: 14 U/L (ref 15–37)
B FRAGILIS DNA BLD POS QL NAA+NON-PROBE: NOT DETECTED
BASOPHILS # BLD: 0.1 K/UL (ref 0–0.1)
BASOPHILS NFR BLD: 1 % (ref 0–1)
BILIRUB SERPL-MCNC: 0.3 MG/DL (ref 0.2–1)
BIOFIRE TEST COMMENT: ABNORMAL
BUN SERPL-MCNC: 5 MG/DL (ref 6–20)
BUN/CREAT SERPL: 6 (ref 12–20)
C ALBICANS DNA BLD POS QL NAA+NON-PROBE: NOT DETECTED
C AURIS DNA BLD POS QL NAA+NON-PROBE: NOT DETECTED
C GATTII+NEOFOR DNA BLD POS QL NAA+N-PRB: NOT DETECTED
C GLABRATA DNA BLD POS QL NAA+NON-PROBE: NOT DETECTED
C KRUSEI DNA BLD POS QL NAA+NON-PROBE: NOT DETECTED
C PARAP DNA BLD POS QL NAA+NON-PROBE: NOT DETECTED
C TROPICLS DNA BLD POS QL NAA+NON-PROBE: NOT DETECTED
CALCIUM SERPL-MCNC: 8.6 MG/DL (ref 8.5–10.1)
CHLORIDE SERPL-SCNC: 107 MMOL/L (ref 97–108)
CO2 SERPL-SCNC: 25 MMOL/L (ref 21–32)
CREAT SERPL-MCNC: 0.81 MG/DL (ref 0.55–1.02)
DIFFERENTIAL METHOD BLD: ABNORMAL
E CLOAC COMP DNA BLD POS NAA+NON-PROBE: NOT DETECTED
E COLI DNA BLD POS QL NAA+NON-PROBE: NOT DETECTED
E FAECALIS DNA BLD POS QL NAA+NON-PROBE: NOT DETECTED
E FAECIUM DNA BLD POS QL NAA+NON-PROBE: NOT DETECTED
ENTEROBACTERALES DNA BLD POS NAA+N-PRB: NOT DETECTED
EOSINOPHIL # BLD: 0.2 K/UL (ref 0–0.4)
EOSINOPHIL NFR BLD: 2 % (ref 0–7)
ERYTHROCYTE [DISTWIDTH] IN BLOOD BY AUTOMATED COUNT: 12.8 % (ref 11.5–14.5)
GLOBULIN SER CALC-MCNC: 3.6 G/DL (ref 2–4)
GLUCOSE BLD STRIP.AUTO-MCNC: 122 MG/DL (ref 65–117)
GLUCOSE BLD STRIP.AUTO-MCNC: 128 MG/DL (ref 65–117)
GLUCOSE BLD STRIP.AUTO-MCNC: 188 MG/DL (ref 65–117)
GLUCOSE BLD STRIP.AUTO-MCNC: 201 MG/DL (ref 65–117)
GLUCOSE SERPL-MCNC: 190 MG/DL (ref 65–100)
GP B STREP DNA BLD POS QL NAA+NON-PROBE: NOT DETECTED
HAEM INFLU DNA BLD POS QL NAA+NON-PROBE: NOT DETECTED
HCT VFR BLD AUTO: 34.1 % (ref 35–47)
HGB BLD-MCNC: 11.4 G/DL (ref 11.5–16)
IMM GRANULOCYTES # BLD AUTO: 0.2 K/UL (ref 0–0.04)
IMM GRANULOCYTES NFR BLD AUTO: 1 % (ref 0–0.5)
K OXYTOCA DNA BLD POS QL NAA+NON-PROBE: NOT DETECTED
KLEBSIELLA SP DNA BLD POS QL NAA+NON-PRB: NOT DETECTED
KLEBSIELLA SP DNA BLD POS QL NAA+NON-PRB: NOT DETECTED
L MONOCYTOG DNA BLD POS QL NAA+NON-PROBE: NOT DETECTED
LYMPHOCYTES # BLD: 4.1 K/UL (ref 0.8–3.5)
LYMPHOCYTES NFR BLD: 32 % (ref 12–49)
MAGNESIUM SERPL-MCNC: 2 MG/DL (ref 1.6–2.4)
MCH RBC QN AUTO: 31.8 PG (ref 26–34)
MCHC RBC AUTO-ENTMCNC: 33.4 G/DL (ref 30–36.5)
MCV RBC AUTO: 95 FL (ref 80–99)
MECA+MECC ISLT/SPM QL: DETECTED
MONOCYTES # BLD: 0.9 K/UL (ref 0–1)
MONOCYTES NFR BLD: 7 % (ref 5–13)
N MEN DNA BLD POS QL NAA+NON-PROBE: NOT DETECTED
NEUTS SEG # BLD: 7.5 K/UL (ref 1.8–8)
NEUTS SEG NFR BLD: 58 % (ref 32–75)
NRBC # BLD: 0 K/UL (ref 0–0.01)
NRBC BLD-RTO: 0 PER 100 WBC
P AERUGINOSA DNA BLD POS NAA+NON-PROBE: NOT DETECTED
PLATELET # BLD AUTO: 341 K/UL (ref 150–400)
PMV BLD AUTO: 9.3 FL (ref 8.9–12.9)
POTASSIUM SERPL-SCNC: 2.9 MMOL/L (ref 3.5–5.1)
PROT SERPL-MCNC: 6.2 G/DL (ref 6.4–8.2)
PROTEUS SP DNA BLD POS QL NAA+NON-PROBE: NOT DETECTED
RBC # BLD AUTO: 3.59 M/UL (ref 3.8–5.2)
RESISTANT GENE TARGETS: ABNORMAL
S AUREUS DNA BLD POS QL NAA+NON-PROBE: NOT DETECTED
S AUREUS+CONS DNA BLD POS NAA+NON-PROBE: DETECTED
S EPIDERMIDIS DNA BLD POS QL NAA+NON-PRB: DETECTED
S LUGDUNENSIS DNA BLD POS QL NAA+NON-PRB: NOT DETECTED
S MALTOPHILIA DNA BLD POS QL NAA+NON-PRB: NOT DETECTED
S MARCESCENS DNA BLD POS NAA+NON-PROBE: NOT DETECTED
S PNEUM DNA BLD POS QL NAA+NON-PROBE: NOT DETECTED
S PYO DNA BLD POS QL NAA+NON-PROBE: NOT DETECTED
SALMONELLA DNA BLD POS QL NAA+NON-PROBE: NOT DETECTED
SERVICE CMNT-IMP: ABNORMAL
SODIUM SERPL-SCNC: 142 MMOL/L (ref 136–145)
STREPTOCOCCUS DNA BLD POS NAA+NON-PROBE: NOT DETECTED
WBC # BLD AUTO: 13 K/UL (ref 3.6–11)

## 2023-08-20 PROCEDURE — 83735 ASSAY OF MAGNESIUM: CPT

## 2023-08-20 PROCEDURE — 36415 COLL VENOUS BLD VENIPUNCTURE: CPT

## 2023-08-20 PROCEDURE — 82962 GLUCOSE BLOOD TEST: CPT

## 2023-08-20 PROCEDURE — 1200000000 HC SEMI PRIVATE

## 2023-08-20 PROCEDURE — 2580000003 HC RX 258: Performed by: STUDENT IN AN ORGANIZED HEALTH CARE EDUCATION/TRAINING PROGRAM

## 2023-08-20 PROCEDURE — 6370000000 HC RX 637 (ALT 250 FOR IP): Performed by: HOSPITALIST

## 2023-08-20 PROCEDURE — 6370000000 HC RX 637 (ALT 250 FOR IP): Performed by: STUDENT IN AN ORGANIZED HEALTH CARE EDUCATION/TRAINING PROGRAM

## 2023-08-20 PROCEDURE — 6360000002 HC RX W HCPCS: Performed by: STUDENT IN AN ORGANIZED HEALTH CARE EDUCATION/TRAINING PROGRAM

## 2023-08-20 PROCEDURE — 80053 COMPREHEN METABOLIC PANEL: CPT

## 2023-08-20 PROCEDURE — 85025 COMPLETE CBC W/AUTO DIFF WBC: CPT

## 2023-08-20 RX ORDER — POTASSIUM CHLORIDE 750 MG/1
40 TABLET, FILM COATED, EXTENDED RELEASE ORAL ONCE
Status: COMPLETED | OUTPATIENT
Start: 2023-08-20 | End: 2023-08-20

## 2023-08-20 RX ADMIN — METRONIDAZOLE 500 MG: 500 TABLET ORAL at 21:05

## 2023-08-20 RX ADMIN — SODIUM CHLORIDE, PRESERVATIVE FREE 20 ML: 5 INJECTION INTRAVENOUS at 21:06

## 2023-08-20 RX ADMIN — POTASSIUM CHLORIDE 40 MEQ: 750 TABLET, EXTENDED RELEASE ORAL at 06:45

## 2023-08-20 RX ADMIN — OXYBUTYNIN CHLORIDE 15 MG: 5 TABLET, EXTENDED RELEASE ORAL at 08:16

## 2023-08-20 RX ADMIN — METRONIDAZOLE 500 MG: 500 TABLET ORAL at 05:28

## 2023-08-20 RX ADMIN — SODIUM CHLORIDE, PRESERVATIVE FREE 5 ML: 5 INJECTION INTRAVENOUS at 09:34

## 2023-08-20 RX ADMIN — VANCOMYCIN HYDROCHLORIDE 2500 MG: 1 INJECTION, POWDER, LYOPHILIZED, FOR SOLUTION INTRAVENOUS at 13:20

## 2023-08-20 RX ADMIN — POTASSIUM BICARBONATE 40 MEQ: 782 TABLET, EFFERVESCENT ORAL at 11:40

## 2023-08-20 RX ADMIN — HALOPERIDOL 10 MG: 5 TABLET ORAL at 08:15

## 2023-08-20 RX ADMIN — LEVOFLOXACIN 750 MG: 750 TABLET, FILM COATED ORAL at 08:16

## 2023-08-20 RX ADMIN — BENZTROPINE MESYLATE 1 MG: 1 TABLET ORAL at 21:05

## 2023-08-20 RX ADMIN — METRONIDAZOLE 500 MG: 500 TABLET ORAL at 13:20

## 2023-08-20 RX ADMIN — AMLODIPINE BESYLATE 10 MG: 5 TABLET ORAL at 08:16

## 2023-08-20 RX ADMIN — CLOZAPINE 300 MG: 100 TABLET ORAL at 21:05

## 2023-08-20 RX ADMIN — INSULIN LISPRO 1 UNITS: 100 INJECTION, SOLUTION INTRAVENOUS; SUBCUTANEOUS at 08:17

## 2023-08-20 RX ADMIN — INSULIN GLARGINE 15 UNITS: 100 INJECTION, SOLUTION SUBCUTANEOUS at 21:04

## 2023-08-20 RX ADMIN — ATORVASTATIN CALCIUM 20 MG: 10 TABLET, FILM COATED ORAL at 08:17

## 2023-08-20 RX ADMIN — HALOPERIDOL 10 MG: 5 TABLET ORAL at 21:05

## 2023-08-20 ASSESSMENT — PAIN SCALES - GENERAL: PAINLEVEL_OUTOF10: 0

## 2023-08-21 ENCOUNTER — APPOINTMENT (OUTPATIENT)
Facility: HOSPITAL | Age: 63
DRG: 872 | End: 2023-08-21
Attending: STUDENT IN AN ORGANIZED HEALTH CARE EDUCATION/TRAINING PROGRAM
Payer: MEDICARE

## 2023-08-21 LAB
ANION GAP SERPL CALC-SCNC: 9 MMOL/L (ref 5–15)
BASOPHILS # BLD: 0 K/UL (ref 0–0.1)
BASOPHILS NFR BLD: 0 % (ref 0–1)
BUN SERPL-MCNC: 5 MG/DL (ref 6–20)
BUN/CREAT SERPL: 7 (ref 12–20)
CALCIUM SERPL-MCNC: 8.6 MG/DL (ref 8.5–10.1)
CHLORIDE SERPL-SCNC: 107 MMOL/L (ref 97–108)
CO2 SERPL-SCNC: 27 MMOL/L (ref 21–32)
CREAT SERPL-MCNC: 0.75 MG/DL (ref 0.55–1.02)
DIFFERENTIAL METHOD BLD: ABNORMAL
ECHO AO ROOT DIAM: 2.5 CM
ECHO AO ROOT INDEX: 1.2 CM/M2
ECHO AV AREA PEAK VELOCITY: 2.1 CM2
ECHO AV AREA PLAN/BSA: 0.96 CM2/M2
ECHO AV AREA PLAN: 2 CM2
ECHO AV AREA/BSA PEAK VELOCITY: 1 CM2/M2
ECHO AV PEAK GRADIENT: 7 MMHG
ECHO AV PEAK VELOCITY: 1.3 M/S
ECHO AV VELOCITY RATIO: 0.77
ECHO BSA: 2.18 M2
ECHO EST RA PRESSURE: 5 MMHG
ECHO LA DIAMETER INDEX: 1.44 CM/M2
ECHO LA DIAMETER: 3 CM
ECHO LA TO AORTIC ROOT RATIO: 1.2
ECHO LA VOL 4C: 33 ML (ref 22–52)
ECHO LA VOLUME INDEX A4C: 16 ML/M2 (ref 16–34)
ECHO LV EDV A4C: 52 ML
ECHO LV EDV INDEX A4C: 25 ML/M2
ECHO LV EJECTION FRACTION A4C: 59 %
ECHO LV ESV A4C: 21 ML
ECHO LV ESV INDEX A4C: 10 ML/M2
ECHO LV FRACTIONAL SHORTENING: 32 % (ref 28–44)
ECHO LV INTERNAL DIMENSION DIASTOLE INDEX: 2.4 CM/M2
ECHO LV INTERNAL DIMENSION DIASTOLIC: 5 CM (ref 3.9–5.3)
ECHO LV INTERNAL DIMENSION SYSTOLIC INDEX: 1.63 CM/M2
ECHO LV INTERNAL DIMENSION SYSTOLIC: 3.4 CM
ECHO LV IVSD: 1.1 CM (ref 0.6–0.9)
ECHO LV MASS 2D: 220.3 G (ref 67–162)
ECHO LV MASS INDEX 2D: 105.9 G/M2 (ref 43–95)
ECHO LV POSTERIOR WALL DIASTOLIC: 1.2 CM (ref 0.6–0.9)
ECHO LV RELATIVE WALL THICKNESS RATIO: 0.48
ECHO LVOT AREA: 2.5 CM2
ECHO LVOT DIAM: 1.8 CM
ECHO LVOT PEAK GRADIENT: 4 MMHG
ECHO LVOT PEAK VELOCITY: 1 M/S
ECHO MV A VELOCITY: 0.44 M/S
ECHO MV AREA PHT: 4.8 CM2
ECHO MV E DECELERATION TIME (DT): 113 MS
ECHO MV E VELOCITY: 0.36 M/S
ECHO MV E/A RATIO: 0.82
ECHO MV MAX VELOCITY: 0.8 M/S
ECHO MV MEAN GRADIENT: 1 MMHG
ECHO MV MEAN VELOCITY: 0.6 M/S
ECHO MV PEAK GRADIENT: 2 MMHG
ECHO MV PRESSURE HALF TIME (PHT): 45.9 MS
ECHO MV VTI: 15.6 CM
ECHO PV MAX VELOCITY: 0.6 M/S
ECHO PV PEAK GRADIENT: 2 MMHG
ECHO RIGHT VENTRICULAR SYSTOLIC PRESSURE (RVSP): 27 MMHG
ECHO TV REGURGITANT MAX VELOCITY: 2.33 M/S
ECHO TV REGURGITANT PEAK GRADIENT: 22 MMHG
EOSINOPHIL # BLD: 0.2 K/UL (ref 0–0.4)
EOSINOPHIL NFR BLD: 2 % (ref 0–7)
ERYTHROCYTE [DISTWIDTH] IN BLOOD BY AUTOMATED COUNT: 12.7 % (ref 11.5–14.5)
GLUCOSE BLD STRIP.AUTO-MCNC: 142 MG/DL (ref 65–117)
GLUCOSE BLD STRIP.AUTO-MCNC: 146 MG/DL (ref 65–117)
GLUCOSE BLD STRIP.AUTO-MCNC: 151 MG/DL (ref 65–117)
GLUCOSE BLD STRIP.AUTO-MCNC: 245 MG/DL (ref 65–117)
GLUCOSE SERPL-MCNC: 159 MG/DL (ref 65–100)
HCT VFR BLD AUTO: 33.6 % (ref 35–47)
HGB BLD-MCNC: 11.3 G/DL (ref 11.5–16)
IMM GRANULOCYTES # BLD AUTO: 0.1 K/UL (ref 0–0.04)
IMM GRANULOCYTES NFR BLD AUTO: 1 % (ref 0–0.5)
LYMPHOCYTES # BLD: 3.7 K/UL (ref 0.8–3.5)
LYMPHOCYTES NFR BLD: 36 % (ref 12–49)
MCH RBC QN AUTO: 31.6 PG (ref 26–34)
MCHC RBC AUTO-ENTMCNC: 33.6 G/DL (ref 30–36.5)
MCV RBC AUTO: 93.9 FL (ref 80–99)
MONOCYTES # BLD: 0.8 K/UL (ref 0–1)
MONOCYTES NFR BLD: 8 % (ref 5–13)
NEUTS SEG # BLD: 5.6 K/UL (ref 1.8–8)
NEUTS SEG NFR BLD: 53 % (ref 32–75)
NRBC # BLD: 0 K/UL (ref 0–0.01)
NRBC BLD-RTO: 0 PER 100 WBC
PLATELET # BLD AUTO: 352 K/UL (ref 150–400)
PMV BLD AUTO: 9.6 FL (ref 8.9–12.9)
POTASSIUM SERPL-SCNC: 3.3 MMOL/L (ref 3.5–5.1)
RBC # BLD AUTO: 3.58 M/UL (ref 3.8–5.2)
SERVICE CMNT-IMP: ABNORMAL
SODIUM SERPL-SCNC: 143 MMOL/L (ref 136–145)
VANCOMYCIN SERPL-MCNC: 30.6 UG/ML
WBC # BLD AUTO: 10.4 K/UL (ref 3.6–11)

## 2023-08-21 PROCEDURE — 36415 COLL VENOUS BLD VENIPUNCTURE: CPT

## 2023-08-21 PROCEDURE — 6370000000 HC RX 637 (ALT 250 FOR IP): Performed by: STUDENT IN AN ORGANIZED HEALTH CARE EDUCATION/TRAINING PROGRAM

## 2023-08-21 PROCEDURE — 6360000002 HC RX W HCPCS: Performed by: STUDENT IN AN ORGANIZED HEALTH CARE EDUCATION/TRAINING PROGRAM

## 2023-08-21 PROCEDURE — 1200000000 HC SEMI PRIVATE

## 2023-08-21 PROCEDURE — 82962 GLUCOSE BLOOD TEST: CPT

## 2023-08-21 PROCEDURE — 85025 COMPLETE CBC W/AUTO DIFF WBC: CPT

## 2023-08-21 PROCEDURE — 87040 BLOOD CULTURE FOR BACTERIA: CPT

## 2023-08-21 PROCEDURE — 93306 TTE W/DOPPLER COMPLETE: CPT

## 2023-08-21 PROCEDURE — 6370000000 HC RX 637 (ALT 250 FOR IP): Performed by: HOSPITALIST

## 2023-08-21 PROCEDURE — 80048 BASIC METABOLIC PNL TOTAL CA: CPT

## 2023-08-21 PROCEDURE — 2580000003 HC RX 258: Performed by: STUDENT IN AN ORGANIZED HEALTH CARE EDUCATION/TRAINING PROGRAM

## 2023-08-21 PROCEDURE — 80202 ASSAY OF VANCOMYCIN: CPT

## 2023-08-21 RX ORDER — POTASSIUM CHLORIDE 750 MG/1
20 TABLET, FILM COATED, EXTENDED RELEASE ORAL ONCE
Status: COMPLETED | OUTPATIENT
Start: 2023-08-21 | End: 2023-08-21

## 2023-08-21 RX ORDER — LITHIUM CARBONATE 300 MG/1
600 TABLET, FILM COATED, EXTENDED RELEASE ORAL EVERY 12 HOURS SCHEDULED
Status: DISCONTINUED | OUTPATIENT
Start: 2023-08-21 | End: 2023-08-29 | Stop reason: HOSPADM

## 2023-08-21 RX ADMIN — HALOPERIDOL 10 MG: 5 TABLET ORAL at 20:39

## 2023-08-21 RX ADMIN — POTASSIUM CHLORIDE 20 MEQ: 750 TABLET, EXTENDED RELEASE ORAL at 06:39

## 2023-08-21 RX ADMIN — METRONIDAZOLE 500 MG: 500 TABLET ORAL at 13:23

## 2023-08-21 RX ADMIN — VANCOMYCIN HYDROCHLORIDE 1250 MG: 10 INJECTION, POWDER, LYOPHILIZED, FOR SOLUTION INTRAVENOUS at 01:52

## 2023-08-21 RX ADMIN — SODIUM CHLORIDE, PRESERVATIVE FREE 5 ML: 5 INJECTION INTRAVENOUS at 09:34

## 2023-08-21 RX ADMIN — INSULIN GLARGINE 15 UNITS: 100 INJECTION, SOLUTION SUBCUTANEOUS at 20:42

## 2023-08-21 RX ADMIN — ATORVASTATIN CALCIUM 20 MG: 10 TABLET, FILM COATED ORAL at 09:26

## 2023-08-21 RX ADMIN — METRONIDAZOLE 500 MG: 500 TABLET ORAL at 06:39

## 2023-08-21 RX ADMIN — ENOXAPARIN SODIUM 30 MG: 100 INJECTION SUBCUTANEOUS at 20:42

## 2023-08-21 RX ADMIN — CLOZAPINE 300 MG: 100 TABLET ORAL at 20:39

## 2023-08-21 RX ADMIN — OXYBUTYNIN CHLORIDE 15 MG: 5 TABLET, EXTENDED RELEASE ORAL at 09:26

## 2023-08-21 RX ADMIN — BENZTROPINE MESYLATE 1 MG: 1 TABLET ORAL at 20:39

## 2023-08-21 RX ADMIN — SODIUM CHLORIDE, PRESERVATIVE FREE 10 ML: 5 INJECTION INTRAVENOUS at 20:44

## 2023-08-21 RX ADMIN — HALOPERIDOL 10 MG: 5 TABLET ORAL at 09:27

## 2023-08-21 RX ADMIN — AMLODIPINE BESYLATE 10 MG: 5 TABLET ORAL at 09:27

## 2023-08-21 RX ADMIN — LITHIUM CARBONATE 600 MG: 300 TABLET, FILM COATED, EXTENDED RELEASE ORAL at 20:39

## 2023-08-21 RX ADMIN — METRONIDAZOLE 500 MG: 500 TABLET ORAL at 22:01

## 2023-08-21 RX ADMIN — LEVOFLOXACIN 750 MG: 750 TABLET, FILM COATED ORAL at 07:42

## 2023-08-21 RX ADMIN — VANCOMYCIN HYDROCHLORIDE 1250 MG: 10 INJECTION, POWDER, LYOPHILIZED, FOR SOLUTION INTRAVENOUS at 14:03

## 2023-08-21 RX ADMIN — ENOXAPARIN SODIUM 30 MG: 100 INJECTION SUBCUTANEOUS at 11:19

## 2023-08-22 LAB
ANION GAP SERPL CALC-SCNC: 6 MMOL/L (ref 5–15)
BACTERIA SPEC CULT: ABNORMAL
BUN SERPL-MCNC: 7 MG/DL (ref 6–20)
BUN/CREAT SERPL: 9 (ref 12–20)
CALCIUM SERPL-MCNC: 8.5 MG/DL (ref 8.5–10.1)
CHLORIDE SERPL-SCNC: 104 MMOL/L (ref 97–108)
CO2 SERPL-SCNC: 29 MMOL/L (ref 21–32)
CREAT SERPL-MCNC: 0.78 MG/DL (ref 0.55–1.02)
EKG ATRIAL RATE: 98 BPM
EKG DIAGNOSIS: NORMAL
EKG P AXIS: 47 DEGREES
EKG P-R INTERVAL: 128 MS
EKG Q-T INTERVAL: 326 MS
EKG QRS DURATION: 88 MS
EKG QTC CALCULATION (BAZETT): 416 MS
EKG R AXIS: -4 DEGREES
EKG T AXIS: 7 DEGREES
EKG VENTRICULAR RATE: 98 BPM
GLUCOSE BLD STRIP.AUTO-MCNC: 192 MG/DL (ref 65–117)
GLUCOSE BLD STRIP.AUTO-MCNC: 207 MG/DL (ref 65–117)
GLUCOSE BLD STRIP.AUTO-MCNC: 212 MG/DL (ref 65–117)
GLUCOSE SERPL-MCNC: 220 MG/DL (ref 65–100)
POTASSIUM SERPL-SCNC: 3.4 MMOL/L (ref 3.5–5.1)
SERVICE CMNT-IMP: ABNORMAL
SODIUM SERPL-SCNC: 139 MMOL/L (ref 136–145)

## 2023-08-22 PROCEDURE — 1200000000 HC SEMI PRIVATE

## 2023-08-22 PROCEDURE — 2580000003 HC RX 258: Performed by: STUDENT IN AN ORGANIZED HEALTH CARE EDUCATION/TRAINING PROGRAM

## 2023-08-22 PROCEDURE — 36415 COLL VENOUS BLD VENIPUNCTURE: CPT

## 2023-08-22 PROCEDURE — 80048 BASIC METABOLIC PNL TOTAL CA: CPT

## 2023-08-22 PROCEDURE — 82962 GLUCOSE BLOOD TEST: CPT

## 2023-08-22 PROCEDURE — 6360000002 HC RX W HCPCS: Performed by: STUDENT IN AN ORGANIZED HEALTH CARE EDUCATION/TRAINING PROGRAM

## 2023-08-22 PROCEDURE — 6370000000 HC RX 637 (ALT 250 FOR IP): Performed by: STUDENT IN AN ORGANIZED HEALTH CARE EDUCATION/TRAINING PROGRAM

## 2023-08-22 RX ORDER — POTASSIUM CHLORIDE 750 MG/1
20 TABLET, FILM COATED, EXTENDED RELEASE ORAL ONCE
Status: COMPLETED | OUTPATIENT
Start: 2023-08-22 | End: 2023-08-22

## 2023-08-22 RX ADMIN — SODIUM CHLORIDE, PRESERVATIVE FREE 10 ML: 5 INJECTION INTRAVENOUS at 08:29

## 2023-08-22 RX ADMIN — VANCOMYCIN HYDROCHLORIDE 1250 MG: 10 INJECTION, POWDER, LYOPHILIZED, FOR SOLUTION INTRAVENOUS at 01:05

## 2023-08-22 RX ADMIN — METRONIDAZOLE 500 MG: 500 TABLET ORAL at 12:28

## 2023-08-22 RX ADMIN — SODIUM CHLORIDE, PRESERVATIVE FREE 10 ML: 5 INJECTION INTRAVENOUS at 21:28

## 2023-08-22 RX ADMIN — POTASSIUM CHLORIDE 20 MEQ: 750 TABLET, EXTENDED RELEASE ORAL at 06:44

## 2023-08-22 RX ADMIN — LITHIUM CARBONATE 600 MG: 300 TABLET, FILM COATED, EXTENDED RELEASE ORAL at 08:29

## 2023-08-22 RX ADMIN — METRONIDAZOLE 500 MG: 500 TABLET ORAL at 06:16

## 2023-08-22 RX ADMIN — METRONIDAZOLE 500 MG: 500 TABLET ORAL at 21:28

## 2023-08-22 RX ADMIN — ATORVASTATIN CALCIUM 20 MG: 10 TABLET, FILM COATED ORAL at 08:28

## 2023-08-22 RX ADMIN — AMLODIPINE BESYLATE 10 MG: 5 TABLET ORAL at 08:28

## 2023-08-22 RX ADMIN — ENOXAPARIN SODIUM 30 MG: 100 INJECTION SUBCUTANEOUS at 08:29

## 2023-08-22 RX ADMIN — BENZTROPINE MESYLATE 1 MG: 1 TABLET ORAL at 21:29

## 2023-08-22 RX ADMIN — HALOPERIDOL 10 MG: 5 TABLET ORAL at 08:29

## 2023-08-22 RX ADMIN — HALOPERIDOL 10 MG: 5 TABLET ORAL at 21:28

## 2023-08-22 RX ADMIN — INSULIN GLARGINE 15 UNITS: 100 INJECTION, SOLUTION SUBCUTANEOUS at 21:27

## 2023-08-22 RX ADMIN — ENOXAPARIN SODIUM 30 MG: 100 INJECTION SUBCUTANEOUS at 21:28

## 2023-08-22 RX ADMIN — VANCOMYCIN HYDROCHLORIDE 1250 MG: 10 INJECTION, POWDER, LYOPHILIZED, FOR SOLUTION INTRAVENOUS at 17:09

## 2023-08-22 RX ADMIN — LITHIUM CARBONATE 600 MG: 300 TABLET, FILM COATED, EXTENDED RELEASE ORAL at 21:28

## 2023-08-22 RX ADMIN — INSULIN LISPRO 2 UNITS: 100 INJECTION, SOLUTION INTRAVENOUS; SUBCUTANEOUS at 12:28

## 2023-08-22 RX ADMIN — OXYBUTYNIN CHLORIDE 15 MG: 5 TABLET, EXTENDED RELEASE ORAL at 08:28

## 2023-08-22 RX ADMIN — LEVOFLOXACIN 750 MG: 750 TABLET, FILM COATED ORAL at 06:44

## 2023-08-22 RX ADMIN — CLOZAPINE 300 MG: 100 TABLET ORAL at 21:28

## 2023-08-22 RX ADMIN — INSULIN LISPRO 1 UNITS: 100 INJECTION, SOLUTION INTRAVENOUS; SUBCUTANEOUS at 17:08

## 2023-08-23 LAB
ANION GAP SERPL CALC-SCNC: 7 MMOL/L (ref 5–15)
BUN SERPL-MCNC: 7 MG/DL (ref 6–20)
BUN/CREAT SERPL: 9 (ref 12–20)
CALCIUM SERPL-MCNC: 8.9 MG/DL (ref 8.5–10.1)
CHLORIDE SERPL-SCNC: 105 MMOL/L (ref 97–108)
CO2 SERPL-SCNC: 29 MMOL/L (ref 21–32)
CREAT SERPL-MCNC: 0.77 MG/DL (ref 0.55–1.02)
GLUCOSE BLD STRIP.AUTO-MCNC: 128 MG/DL (ref 65–117)
GLUCOSE BLD STRIP.AUTO-MCNC: 168 MG/DL (ref 65–117)
GLUCOSE BLD STRIP.AUTO-MCNC: 200 MG/DL (ref 65–117)
GLUCOSE BLD STRIP.AUTO-MCNC: 225 MG/DL (ref 65–117)
GLUCOSE SERPL-MCNC: 189 MG/DL (ref 65–100)
POTASSIUM SERPL-SCNC: 3.5 MMOL/L (ref 3.5–5.1)
SERVICE CMNT-IMP: ABNORMAL
SODIUM SERPL-SCNC: 141 MMOL/L (ref 136–145)
VANCOMYCIN SERPL-MCNC: 11.5 UG/ML

## 2023-08-23 PROCEDURE — 2580000003 HC RX 258: Performed by: STUDENT IN AN ORGANIZED HEALTH CARE EDUCATION/TRAINING PROGRAM

## 2023-08-23 PROCEDURE — 6370000000 HC RX 637 (ALT 250 FOR IP): Performed by: STUDENT IN AN ORGANIZED HEALTH CARE EDUCATION/TRAINING PROGRAM

## 2023-08-23 PROCEDURE — 6360000002 HC RX W HCPCS: Performed by: STUDENT IN AN ORGANIZED HEALTH CARE EDUCATION/TRAINING PROGRAM

## 2023-08-23 PROCEDURE — 1200000000 HC SEMI PRIVATE

## 2023-08-23 PROCEDURE — 80048 BASIC METABOLIC PNL TOTAL CA: CPT

## 2023-08-23 PROCEDURE — 80202 ASSAY OF VANCOMYCIN: CPT

## 2023-08-23 PROCEDURE — 82962 GLUCOSE BLOOD TEST: CPT

## 2023-08-23 PROCEDURE — 36415 COLL VENOUS BLD VENIPUNCTURE: CPT

## 2023-08-23 RX ADMIN — CLOZAPINE 300 MG: 100 TABLET ORAL at 20:40

## 2023-08-23 RX ADMIN — VANCOMYCIN HYDROCHLORIDE 1250 MG: 10 INJECTION, POWDER, LYOPHILIZED, FOR SOLUTION INTRAVENOUS at 19:58

## 2023-08-23 RX ADMIN — VANCOMYCIN HYDROCHLORIDE 1250 MG: 1.25 INJECTION, POWDER, LYOPHILIZED, FOR SOLUTION INTRAVENOUS at 08:36

## 2023-08-23 RX ADMIN — OXYBUTYNIN CHLORIDE 15 MG: 5 TABLET, EXTENDED RELEASE ORAL at 08:42

## 2023-08-23 RX ADMIN — METRONIDAZOLE 500 MG: 500 TABLET ORAL at 21:14

## 2023-08-23 RX ADMIN — ATORVASTATIN CALCIUM 20 MG: 10 TABLET, FILM COATED ORAL at 08:42

## 2023-08-23 RX ADMIN — INSULIN LISPRO 1 UNITS: 100 INJECTION, SOLUTION INTRAVENOUS; SUBCUTANEOUS at 12:12

## 2023-08-23 RX ADMIN — LITHIUM CARBONATE 600 MG: 300 TABLET, FILM COATED, EXTENDED RELEASE ORAL at 20:40

## 2023-08-23 RX ADMIN — AMLODIPINE BESYLATE 10 MG: 5 TABLET ORAL at 08:42

## 2023-08-23 RX ADMIN — SODIUM CHLORIDE, PRESERVATIVE FREE 10 ML: 5 INJECTION INTRAVENOUS at 08:43

## 2023-08-23 RX ADMIN — METRONIDAZOLE 500 MG: 500 TABLET ORAL at 06:31

## 2023-08-23 RX ADMIN — METRONIDAZOLE 500 MG: 500 TABLET ORAL at 13:33

## 2023-08-23 RX ADMIN — HALOPERIDOL 10 MG: 5 TABLET ORAL at 08:42

## 2023-08-23 RX ADMIN — LEVOFLOXACIN 750 MG: 750 TABLET, FILM COATED ORAL at 06:31

## 2023-08-23 RX ADMIN — ENOXAPARIN SODIUM 30 MG: 100 INJECTION SUBCUTANEOUS at 20:39

## 2023-08-23 RX ADMIN — LITHIUM CARBONATE 600 MG: 300 TABLET, FILM COATED, EXTENDED RELEASE ORAL at 08:42

## 2023-08-23 RX ADMIN — INSULIN GLARGINE 15 UNITS: 100 INJECTION, SOLUTION SUBCUTANEOUS at 20:38

## 2023-08-23 RX ADMIN — SODIUM CHLORIDE, PRESERVATIVE FREE 10 ML: 5 INJECTION INTRAVENOUS at 20:40

## 2023-08-23 RX ADMIN — BENZTROPINE MESYLATE 1 MG: 1 TABLET ORAL at 20:39

## 2023-08-23 RX ADMIN — ENOXAPARIN SODIUM 30 MG: 100 INJECTION SUBCUTANEOUS at 08:42

## 2023-08-23 RX ADMIN — HALOPERIDOL 10 MG: 5 TABLET ORAL at 20:40

## 2023-08-23 ASSESSMENT — PAIN SCALES - GENERAL
PAINLEVEL_OUTOF10: 0
PAINLEVEL_OUTOF10: 0

## 2023-08-24 ENCOUNTER — APPOINTMENT (OUTPATIENT)
Facility: HOSPITAL | Age: 63
DRG: 872 | End: 2023-08-24
Payer: MEDICARE

## 2023-08-24 LAB
ALBUMIN SERPL-MCNC: 3 G/DL (ref 3.5–5)
ALBUMIN/GLOB SERPL: 0.9 (ref 1.1–2.2)
ALP SERPL-CCNC: 333 U/L (ref 45–117)
ALT SERPL-CCNC: 69 U/L (ref 12–78)
AMMONIA PLAS-SCNC: 24 UMOL/L
ANION GAP SERPL CALC-SCNC: 9 MMOL/L (ref 5–15)
AST SERPL-CCNC: 60 U/L (ref 15–37)
BILIRUB SERPL-MCNC: 0.3 MG/DL (ref 0.2–1)
BUN SERPL-MCNC: 7 MG/DL (ref 6–20)
BUN/CREAT SERPL: 9 (ref 12–20)
CALCIUM SERPL-MCNC: 8.9 MG/DL (ref 8.5–10.1)
CHLORIDE SERPL-SCNC: 104 MMOL/L (ref 97–108)
CO2 SERPL-SCNC: 28 MMOL/L (ref 21–32)
CREAT SERPL-MCNC: 0.69 MG/DL (ref 0.55–1.02)
CREAT SERPL-MCNC: 0.77 MG/DL (ref 0.55–1.02)
ERYTHROCYTE [DISTWIDTH] IN BLOOD BY AUTOMATED COUNT: 12.7 % (ref 11.5–14.5)
GLOBULIN SER CALC-MCNC: 3.5 G/DL (ref 2–4)
GLUCOSE BLD STRIP.AUTO-MCNC: 114 MG/DL (ref 65–117)
GLUCOSE BLD STRIP.AUTO-MCNC: 150 MG/DL (ref 65–117)
GLUCOSE BLD STRIP.AUTO-MCNC: 156 MG/DL (ref 65–117)
GLUCOSE BLD STRIP.AUTO-MCNC: 175 MG/DL (ref 65–117)
GLUCOSE SERPL-MCNC: 130 MG/DL (ref 65–100)
HCT VFR BLD AUTO: 37.8 % (ref 35–47)
HGB BLD-MCNC: 12.6 G/DL (ref 11.5–16)
MCH RBC QN AUTO: 31.3 PG (ref 26–34)
MCHC RBC AUTO-ENTMCNC: 33.3 G/DL (ref 30–36.5)
MCV RBC AUTO: 94 FL (ref 80–99)
NRBC # BLD: 0 K/UL (ref 0–0.01)
NRBC BLD-RTO: 0 PER 100 WBC
PLATELET # BLD AUTO: 355 K/UL (ref 150–400)
PMV BLD AUTO: 9 FL (ref 8.9–12.9)
POTASSIUM SERPL-SCNC: 3.5 MMOL/L (ref 3.5–5.1)
PROT SERPL-MCNC: 6.5 G/DL (ref 6.4–8.2)
RBC # BLD AUTO: 4.02 M/UL (ref 3.8–5.2)
SERVICE CMNT-IMP: ABNORMAL
SERVICE CMNT-IMP: NORMAL
SODIUM SERPL-SCNC: 141 MMOL/L (ref 136–145)
WBC # BLD AUTO: 11.9 K/UL (ref 3.6–11)

## 2023-08-24 PROCEDURE — 85027 COMPLETE CBC AUTOMATED: CPT

## 2023-08-24 PROCEDURE — 36415 COLL VENOUS BLD VENIPUNCTURE: CPT

## 2023-08-24 PROCEDURE — 6370000000 HC RX 637 (ALT 250 FOR IP): Performed by: STUDENT IN AN ORGANIZED HEALTH CARE EDUCATION/TRAINING PROGRAM

## 2023-08-24 PROCEDURE — 1200000000 HC SEMI PRIVATE

## 2023-08-24 PROCEDURE — 82140 ASSAY OF AMMONIA: CPT

## 2023-08-24 PROCEDURE — 2580000003 HC RX 258: Performed by: STUDENT IN AN ORGANIZED HEALTH CARE EDUCATION/TRAINING PROGRAM

## 2023-08-24 PROCEDURE — 82962 GLUCOSE BLOOD TEST: CPT

## 2023-08-24 PROCEDURE — 6360000002 HC RX W HCPCS: Performed by: STUDENT IN AN ORGANIZED HEALTH CARE EDUCATION/TRAINING PROGRAM

## 2023-08-24 PROCEDURE — 70450 CT HEAD/BRAIN W/O DYE: CPT

## 2023-08-24 PROCEDURE — 80053 COMPREHEN METABOLIC PANEL: CPT

## 2023-08-24 RX ADMIN — POLYETHYLENE GLYCOL 3350 17 G: 17 POWDER, FOR SOLUTION ORAL at 11:07

## 2023-08-24 RX ADMIN — HALOPERIDOL 10 MG: 5 TABLET ORAL at 09:29

## 2023-08-24 RX ADMIN — AMLODIPINE BESYLATE 10 MG: 5 TABLET ORAL at 09:29

## 2023-08-24 RX ADMIN — VANCOMYCIN HYDROCHLORIDE 1250 MG: 10 INJECTION, POWDER, LYOPHILIZED, FOR SOLUTION INTRAVENOUS at 07:53

## 2023-08-24 RX ADMIN — METRONIDAZOLE 500 MG: 500 TABLET ORAL at 06:03

## 2023-08-24 RX ADMIN — BENZTROPINE MESYLATE 1 MG: 1 TABLET ORAL at 21:51

## 2023-08-24 RX ADMIN — CLOZAPINE 300 MG: 100 TABLET ORAL at 21:51

## 2023-08-24 RX ADMIN — SODIUM CHLORIDE, PRESERVATIVE FREE 10 ML: 5 INJECTION INTRAVENOUS at 20:18

## 2023-08-24 RX ADMIN — VANCOMYCIN HYDROCHLORIDE 1250 MG: 10 INJECTION, POWDER, LYOPHILIZED, FOR SOLUTION INTRAVENOUS at 20:18

## 2023-08-24 RX ADMIN — INSULIN GLARGINE 15 UNITS: 100 INJECTION, SOLUTION SUBCUTANEOUS at 21:51

## 2023-08-24 RX ADMIN — LITHIUM CARBONATE 600 MG: 300 TABLET, FILM COATED, EXTENDED RELEASE ORAL at 21:51

## 2023-08-24 RX ADMIN — METRONIDAZOLE 500 MG: 500 TABLET ORAL at 15:21

## 2023-08-24 RX ADMIN — HALOPERIDOL 10 MG: 5 TABLET ORAL at 21:51

## 2023-08-24 RX ADMIN — ATORVASTATIN CALCIUM 20 MG: 10 TABLET, FILM COATED ORAL at 09:29

## 2023-08-24 RX ADMIN — OXYBUTYNIN CHLORIDE 15 MG: 5 TABLET, EXTENDED RELEASE ORAL at 09:30

## 2023-08-24 RX ADMIN — LEVOFLOXACIN 750 MG: 750 TABLET, FILM COATED ORAL at 07:53

## 2023-08-24 RX ADMIN — LITHIUM CARBONATE 600 MG: 300 TABLET, FILM COATED, EXTENDED RELEASE ORAL at 09:29

## 2023-08-24 RX ADMIN — ENOXAPARIN SODIUM 30 MG: 100 INJECTION SUBCUTANEOUS at 21:51

## 2023-08-24 RX ADMIN — METRONIDAZOLE 500 MG: 500 TABLET ORAL at 21:51

## 2023-08-24 RX ADMIN — ENOXAPARIN SODIUM 30 MG: 100 INJECTION SUBCUTANEOUS at 09:29

## 2023-08-24 RX ADMIN — SODIUM CHLORIDE, PRESERVATIVE FREE 10 ML: 5 INJECTION INTRAVENOUS at 09:30

## 2023-08-24 NOTE — PLAN OF CARE
Reviewed care plan    Problem: Discharge Planning  Goal: Discharge to home or other facility with appropriate resources  8/23/2023 2147 by Hebert Mccord RN  Outcome: Progressing  8/23/2023 1331 by Bonnie Donis LPN  Outcome: Progressing     Problem: Safety - Adult  Goal: Free from fall injury  8/23/2023 2147 by Hebert Mccord RN  Outcome: Progressing  8/23/2023 1331 by Bonnie Donis LPN  Outcome: Progressing     Problem: Chronic Conditions and Co-morbidities  Goal: Patient's chronic conditions and co-morbidity symptoms are monitored and maintained or improved  8/23/2023 2147 by Hebert Mccord RN  Outcome: Progressing  8/23/2023 1331 by Bonnie Donis LPN  Outcome: Progressing     Problem: Skin/Tissue Integrity  Goal: Absence of new skin breakdown  Description: 1. Monitor for areas of redness and/or skin breakdown  2. Assess vascular access sites hourly  3. Every 4-6 hours minimum:  Change oxygen saturation probe site  4. Every 4-6 hours:  If on nasal continuous positive airway pressure, respiratory therapy assess nares and determine need for appliance change or resting period.   8/23/2023 2147 by Hebert Mccord RN  Outcome: Progressing  8/23/2023 1331 by Bonnie Donis LPN  Outcome: Progressing     Problem: Infection - Adult  Goal: Absence of infection at discharge  8/23/2023 2147 by Hebert Mccord RN  Outcome: Progressing  8/23/2023 1331 by Bonnie Donis LPN  Outcome: Progressing

## 2023-08-25 LAB
ALBUMIN SERPL-MCNC: 3 G/DL (ref 3.5–5)
ALBUMIN/GLOB SERPL: 0.9 (ref 1.1–2.2)
ALP SERPL-CCNC: 361 U/L (ref 45–117)
ALT SERPL-CCNC: 64 U/L (ref 12–78)
ANION GAP SERPL CALC-SCNC: 7 MMOL/L (ref 5–15)
AST SERPL-CCNC: 65 U/L (ref 15–37)
BASOPHILS # BLD: 0.1 K/UL (ref 0–0.1)
BASOPHILS NFR BLD: 1 % (ref 0–1)
BILIRUB SERPL-MCNC: 0.4 MG/DL (ref 0.2–1)
BUN SERPL-MCNC: 8 MG/DL (ref 6–20)
BUN/CREAT SERPL: 10 (ref 12–20)
CALCIUM SERPL-MCNC: 9 MG/DL (ref 8.5–10.1)
CHLORIDE SERPL-SCNC: 105 MMOL/L (ref 97–108)
CO2 SERPL-SCNC: 28 MMOL/L (ref 21–32)
CREAT SERPL-MCNC: 0.79 MG/DL (ref 0.55–1.02)
DIFFERENTIAL METHOD BLD: ABNORMAL
EOSINOPHIL # BLD: 0.1 K/UL (ref 0–0.4)
EOSINOPHIL NFR BLD: 1 % (ref 0–7)
ERYTHROCYTE [DISTWIDTH] IN BLOOD BY AUTOMATED COUNT: 13 % (ref 11.5–14.5)
GLOBULIN SER CALC-MCNC: 3.5 G/DL (ref 2–4)
GLUCOSE BLD STRIP.AUTO-MCNC: 120 MG/DL (ref 65–117)
GLUCOSE BLD STRIP.AUTO-MCNC: 127 MG/DL (ref 65–117)
GLUCOSE BLD STRIP.AUTO-MCNC: 140 MG/DL (ref 65–117)
GLUCOSE SERPL-MCNC: 142 MG/DL (ref 65–100)
HCT VFR BLD AUTO: 39.2 % (ref 35–47)
HGB BLD-MCNC: 13.1 G/DL (ref 11.5–16)
IMM GRANULOCYTES # BLD AUTO: 0.1 K/UL (ref 0–0.04)
IMM GRANULOCYTES NFR BLD AUTO: 1 % (ref 0–0.5)
LYMPHOCYTES # BLD: 2.7 K/UL (ref 0.8–3.5)
LYMPHOCYTES NFR BLD: 25 % (ref 12–49)
MCH RBC QN AUTO: 30.8 PG (ref 26–34)
MCHC RBC AUTO-ENTMCNC: 33.4 G/DL (ref 30–36.5)
MCV RBC AUTO: 92.2 FL (ref 80–99)
MONOCYTES # BLD: 0.8 K/UL (ref 0–1)
MONOCYTES NFR BLD: 8 % (ref 5–13)
NEUTS SEG # BLD: 7.3 K/UL (ref 1.8–8)
NEUTS SEG NFR BLD: 64 % (ref 32–75)
NRBC # BLD: 0 K/UL (ref 0–0.01)
NRBC BLD-RTO: 0 PER 100 WBC
PLATELET # BLD AUTO: 341 K/UL (ref 150–400)
PMV BLD AUTO: 9.4 FL (ref 8.9–12.9)
POTASSIUM SERPL-SCNC: 3.4 MMOL/L (ref 3.5–5.1)
PROT SERPL-MCNC: 6.5 G/DL (ref 6.4–8.2)
RBC # BLD AUTO: 4.25 M/UL (ref 3.8–5.2)
SERVICE CMNT-IMP: ABNORMAL
SODIUM SERPL-SCNC: 140 MMOL/L (ref 136–145)
WBC # BLD AUTO: 11.1 K/UL (ref 3.6–11)

## 2023-08-25 PROCEDURE — 2580000003 HC RX 258: Performed by: STUDENT IN AN ORGANIZED HEALTH CARE EDUCATION/TRAINING PROGRAM

## 2023-08-25 PROCEDURE — 80053 COMPREHEN METABOLIC PANEL: CPT

## 2023-08-25 PROCEDURE — 36415 COLL VENOUS BLD VENIPUNCTURE: CPT

## 2023-08-25 PROCEDURE — 82962 GLUCOSE BLOOD TEST: CPT

## 2023-08-25 PROCEDURE — 6370000000 HC RX 637 (ALT 250 FOR IP): Performed by: STUDENT IN AN ORGANIZED HEALTH CARE EDUCATION/TRAINING PROGRAM

## 2023-08-25 PROCEDURE — 85025 COMPLETE CBC W/AUTO DIFF WBC: CPT

## 2023-08-25 PROCEDURE — 6370000000 HC RX 637 (ALT 250 FOR IP): Performed by: INTERNAL MEDICINE

## 2023-08-25 PROCEDURE — 6360000002 HC RX W HCPCS: Performed by: STUDENT IN AN ORGANIZED HEALTH CARE EDUCATION/TRAINING PROGRAM

## 2023-08-25 PROCEDURE — 80159 DRUG ASSAY CLOZAPINE: CPT

## 2023-08-25 PROCEDURE — 93005 ELECTROCARDIOGRAM TRACING: CPT | Performed by: INTERNAL MEDICINE

## 2023-08-25 PROCEDURE — 6370000000 HC RX 637 (ALT 250 FOR IP): Performed by: PSYCHIATRY & NEUROLOGY

## 2023-08-25 PROCEDURE — 1200000000 HC SEMI PRIVATE

## 2023-08-25 RX ORDER — POTASSIUM CHLORIDE 750 MG/1
20 TABLET, FILM COATED, EXTENDED RELEASE ORAL ONCE
Status: COMPLETED | OUTPATIENT
Start: 2023-08-25 | End: 2023-08-25

## 2023-08-25 RX ORDER — HALOPERIDOL DECANOATE 100 MG/ML
250 INJECTION INTRAMUSCULAR
Status: DISCONTINUED | OUTPATIENT
Start: 2023-08-28 | End: 2023-08-29 | Stop reason: HOSPADM

## 2023-08-25 RX ADMIN — METRONIDAZOLE 500 MG: 500 TABLET ORAL at 21:40

## 2023-08-25 RX ADMIN — ATORVASTATIN CALCIUM 20 MG: 10 TABLET, FILM COATED ORAL at 08:26

## 2023-08-25 RX ADMIN — LITHIUM CARBONATE 600 MG: 300 TABLET, FILM COATED, EXTENDED RELEASE ORAL at 21:40

## 2023-08-25 RX ADMIN — METRONIDAZOLE 500 MG: 500 TABLET ORAL at 14:04

## 2023-08-25 RX ADMIN — HALOPERIDOL 10 MG: 5 TABLET ORAL at 21:40

## 2023-08-25 RX ADMIN — ENOXAPARIN SODIUM 30 MG: 100 INJECTION SUBCUTANEOUS at 08:30

## 2023-08-25 RX ADMIN — METRONIDAZOLE 500 MG: 500 TABLET ORAL at 06:28

## 2023-08-25 RX ADMIN — HALOPERIDOL 10 MG: 5 TABLET ORAL at 08:26

## 2023-08-25 RX ADMIN — ENOXAPARIN SODIUM 30 MG: 100 INJECTION SUBCUTANEOUS at 21:39

## 2023-08-25 RX ADMIN — BENZTROPINE MESYLATE 1 MG: 1 TABLET ORAL at 21:40

## 2023-08-25 RX ADMIN — VANCOMYCIN HYDROCHLORIDE 1250 MG: 10 INJECTION, POWDER, LYOPHILIZED, FOR SOLUTION INTRAVENOUS at 20:20

## 2023-08-25 RX ADMIN — POTASSIUM CHLORIDE 20 MEQ: 750 TABLET, EXTENDED RELEASE ORAL at 14:04

## 2023-08-25 RX ADMIN — LEVOFLOXACIN 750 MG: 750 TABLET, FILM COATED ORAL at 06:28

## 2023-08-25 RX ADMIN — AMLODIPINE BESYLATE 10 MG: 5 TABLET ORAL at 08:26

## 2023-08-25 RX ADMIN — INSULIN GLARGINE 15 UNITS: 100 INJECTION, SOLUTION SUBCUTANEOUS at 21:39

## 2023-08-25 RX ADMIN — CLOZAPINE 300 MG: 100 TABLET ORAL at 21:40

## 2023-08-25 RX ADMIN — SODIUM CHLORIDE, PRESERVATIVE FREE 10 ML: 5 INJECTION INTRAVENOUS at 21:40

## 2023-08-25 RX ADMIN — VANCOMYCIN HYDROCHLORIDE 1250 MG: 10 INJECTION, POWDER, LYOPHILIZED, FOR SOLUTION INTRAVENOUS at 08:27

## 2023-08-25 RX ADMIN — SODIUM CHLORIDE, PRESERVATIVE FREE 10 ML: 5 INJECTION INTRAVENOUS at 08:27

## 2023-08-25 RX ADMIN — OXYBUTYNIN CHLORIDE 15 MG: 5 TABLET, EXTENDED RELEASE ORAL at 08:26

## 2023-08-25 RX ADMIN — LITHIUM CARBONATE 600 MG: 300 TABLET, FILM COATED, EXTENDED RELEASE ORAL at 08:26

## 2023-08-25 NOTE — ED NOTES
0705- Bedside and Verbal shift change report given to juan antonio (oncoming nurse) by Joni Carrington (offgoing nurse). Report included the following information Nurse Handoff Report, Index, Intake/Output, MAR, and Recent Results. Patient resting comfortably at this time. No pain interventions required at this time. Call bell within reach, safety precautions in place, bed locked and in the lowest position. 9729- Patient seen and assessed. No new complaints at this time. Will continue to monitor, Call bell within reach, safety precautions in place, bed locked and in the lowest position. 3545- pct at bedside obtaining ekg    1000-Hourly rounding completed on this pt. Offered assistance for toileting or hygiene at this time. Provided opportunity for snack nourishment or PO fluid hydration. Pt is up-to-date on plan of care. No pain interventions required at this time. Warm blanket offered, call bell within reach, safety precautions in place, bed locked and in the lowest position. 1100-Hourly rounding completed on this pt. Offered assistance for toileting or hygiene at this time. Provided opportunity for snack nourishment or PO fluid hydration. Pt is up-to-date on plan of care. No pain interventions required at this time. Warm blanket offered, call bell within reach, safety precautions in place, bed locked and in the lowest position. 1200-Hourly rounding completed on this pt. Offered assistance for toileting or hygiene at this time. Provided opportunity for snack nourishment or PO fluid hydration. Pt is up-to-date on plan of care. No pain interventions required at this time. Warm blanket offered, call bell within reach, safety precautions in place, bed locked and in the lowest position. 1300- Hourly rounding completed on this pt. Offered assistance for toileting or hygiene at this time. Provided opportunity for snack nourishment or PO fluid hydration. Pt is up-to-date on plan of care.  No pain interventions

## 2023-08-25 NOTE — BSMART NOTE
Initial BSMART Liaison Assessment Form     Section I - Integrated Summary    Chief Complaint is drowsiness, medications related? Adjustment of psych medications. LOS:  7     Presenting problem/Summary:  Patient assessed face to face with Dr Marcial adrian and tech who was working on completing EKG. Patient reports that she is doing okay but is in the hospital for her anxiety. She does not appear to understand the medical issues going on. She denies anxiety, depression, SI, HI, an dhallucinations. She reports sleeping okay but not eating well. She reports that she is taking her medications and does not have any issues. She presents as alert and oriented to person and place but not situation. She was pleasant. During assessment, her cell phone rang and she asked to be handed it. She was ahnded her phoe by this writer and was observed talking on it for a few minutes. She was able to carry on a complete conversation with the person on the phone and with the team.    Precipitant Factors are medical condition- infection. The information is given by the patient. Current Psychiatrist and/or  [facility] ACT team.  Previous Hospitalizations/Treatment: yes    Lethality Assessment:  The potential for suicide is not noted. The potential for homicide is not noted. The patient has not been a perpetrator of sexual or physical abuse. There are not pending charges. The patient is not felt to be at risk for self-harm or harm to others. Section II - Psychosocial  The patient's overall mood and attitude is calm and cooperative. Feelings of helplessness and hopelessness are not observed. Generalized anxiety is not observed. Panic is not observed. Phobias are not observed. Obsessive compulsive tendencies are not observed. Section III - Mental Status Exam  The patient's appearance shows no evidence of impairment. The patient's behavior shows no evidence of impairment.  The patient is oriented to

## 2023-08-25 NOTE — PLAN OF CARE
Problem: Discharge Planning  Goal: Discharge to home or other facility with appropriate resources  8/25/2023 0901 by Jelani Barahona RN  Outcome: Progressing  Flowsheets (Taken 8/25/2023 1680)  Discharge to home or other facility with appropriate resources:   Identify barriers to discharge with patient and caregiver   Arrange for needed discharge resources and transportation as appropriate   Identify discharge learning needs (meds, wound care, etc)   Refer to discharge planning if patient needs post-hospital services based on physician order or complex needs related to functional status, cognitive ability or social support system   Arrange for interpreters to assist at discharge as needed  8/24/2023 2242 by Charmaine De La O RN  Outcome: Progressing     Problem: Safety - Adult  Goal: Free from fall injury  8/25/2023 0901 by Jelani Barahona RN  Outcome: Jared Og  8/24/2023 2242 by Charmaine De La O RN  Outcome: Progressing     Problem: Chronic Conditions and Co-morbidities  Goal: Patient's chronic conditions and co-morbidity symptoms are monitored and maintained or improved  8/25/2023 0901 by Jelnai Barahona RN  Outcome: Progressing  Flowsheets (Taken 8/25/2023 0810)  Care Plan - Patient's Chronic Conditions and Co-Morbidity Symptoms are Monitored and Maintained or Improved:   Monitor and assess patient's chronic conditions and comorbid symptoms for stability, deterioration, or improvement   Collaborate with multidisciplinary team to address chronic and comorbid conditions and prevent exacerbation or deterioration   Update acute care plan with appropriate goals if chronic or comorbid symptoms are exacerbated and prevent overall improvement and discharge  8/24/2023 2242 by Charmaine De La O RN  Outcome: Progressing     Problem: Skin/Tissue Integrity  Goal: Absence of new skin breakdown  Description: 1. Monitor for areas of redness and/or skin breakdown  2. Assess vascular access sites hourly  3.   Every 4-6 hours

## 2023-08-26 LAB
BACTERIA SPEC CULT: NORMAL
BACTERIA SPEC CULT: NORMAL
CREAT SERPL-MCNC: 0.73 MG/DL (ref 0.55–1.02)
GLUCOSE BLD STRIP.AUTO-MCNC: 120 MG/DL (ref 65–117)
GLUCOSE BLD STRIP.AUTO-MCNC: 121 MG/DL (ref 65–117)
GLUCOSE BLD STRIP.AUTO-MCNC: 124 MG/DL (ref 65–117)
GLUCOSE BLD STRIP.AUTO-MCNC: 141 MG/DL (ref 65–117)
SERVICE CMNT-IMP: ABNORMAL
SERVICE CMNT-IMP: NORMAL
SERVICE CMNT-IMP: NORMAL

## 2023-08-26 PROCEDURE — 6360000002 HC RX W HCPCS: Performed by: STUDENT IN AN ORGANIZED HEALTH CARE EDUCATION/TRAINING PROGRAM

## 2023-08-26 PROCEDURE — 2580000003 HC RX 258: Performed by: STUDENT IN AN ORGANIZED HEALTH CARE EDUCATION/TRAINING PROGRAM

## 2023-08-26 PROCEDURE — 82962 GLUCOSE BLOOD TEST: CPT

## 2023-08-26 PROCEDURE — 36415 COLL VENOUS BLD VENIPUNCTURE: CPT

## 2023-08-26 PROCEDURE — 1200000000 HC SEMI PRIVATE

## 2023-08-26 PROCEDURE — 6370000000 HC RX 637 (ALT 250 FOR IP): Performed by: PSYCHIATRY & NEUROLOGY

## 2023-08-26 PROCEDURE — 82565 ASSAY OF CREATININE: CPT

## 2023-08-26 PROCEDURE — 6370000000 HC RX 637 (ALT 250 FOR IP): Performed by: STUDENT IN AN ORGANIZED HEALTH CARE EDUCATION/TRAINING PROGRAM

## 2023-08-26 RX ADMIN — LITHIUM CARBONATE 600 MG: 300 TABLET, FILM COATED, EXTENDED RELEASE ORAL at 08:57

## 2023-08-26 RX ADMIN — HALOPERIDOL 10 MG: 5 TABLET ORAL at 21:40

## 2023-08-26 RX ADMIN — ENOXAPARIN SODIUM 30 MG: 100 INJECTION SUBCUTANEOUS at 08:55

## 2023-08-26 RX ADMIN — AMLODIPINE BESYLATE 10 MG: 5 TABLET ORAL at 08:57

## 2023-08-26 RX ADMIN — SODIUM CHLORIDE, PRESERVATIVE FREE 10 ML: 5 INJECTION INTRAVENOUS at 21:42

## 2023-08-26 RX ADMIN — INSULIN GLARGINE 15 UNITS: 100 INJECTION, SOLUTION SUBCUTANEOUS at 21:41

## 2023-08-26 RX ADMIN — ENOXAPARIN SODIUM 30 MG: 100 INJECTION SUBCUTANEOUS at 21:40

## 2023-08-26 RX ADMIN — METRONIDAZOLE 500 MG: 500 TABLET ORAL at 16:14

## 2023-08-26 RX ADMIN — SODIUM CHLORIDE, PRESERVATIVE FREE 10 ML: 5 INJECTION INTRAVENOUS at 08:58

## 2023-08-26 RX ADMIN — HALOPERIDOL 10 MG: 5 TABLET ORAL at 08:57

## 2023-08-26 RX ADMIN — BENZTROPINE MESYLATE 1 MG: 1 TABLET ORAL at 21:41

## 2023-08-26 RX ADMIN — VANCOMYCIN HYDROCHLORIDE 1250 MG: 10 INJECTION, POWDER, LYOPHILIZED, FOR SOLUTION INTRAVENOUS at 09:02

## 2023-08-26 RX ADMIN — CLOZAPINE 300 MG: 100 TABLET ORAL at 21:40

## 2023-08-26 RX ADMIN — ATORVASTATIN CALCIUM 20 MG: 10 TABLET, FILM COATED ORAL at 08:57

## 2023-08-26 RX ADMIN — LITHIUM CARBONATE 600 MG: 300 TABLET, FILM COATED, EXTENDED RELEASE ORAL at 21:41

## 2023-08-26 RX ADMIN — VANCOMYCIN HYDROCHLORIDE 1250 MG: 10 INJECTION, POWDER, LYOPHILIZED, FOR SOLUTION INTRAVENOUS at 21:39

## 2023-08-26 RX ADMIN — POLYETHYLENE GLYCOL 3350 17 G: 17 POWDER, FOR SOLUTION ORAL at 16:14

## 2023-08-26 RX ADMIN — METRONIDAZOLE 500 MG: 500 TABLET ORAL at 06:30

## 2023-08-26 RX ADMIN — LEVOFLOXACIN 750 MG: 750 TABLET, FILM COATED ORAL at 06:30

## 2023-08-26 RX ADMIN — METRONIDAZOLE 500 MG: 500 TABLET ORAL at 21:41

## 2023-08-26 RX ADMIN — OXYBUTYNIN CHLORIDE 15 MG: 5 TABLET, EXTENDED RELEASE ORAL at 08:56

## 2023-08-26 NOTE — PLAN OF CARE
Problem: Discharge Planning  Goal: Discharge to home or other facility with appropriate resources  8/25/2023 2339 by David Triplett RN  Outcome: Progressing     Problem: Safety - Adult  Goal: Free from fall injury  8/25/2023 2339 by David Triplett RN  Outcome: Progressing     Problem: Chronic Conditions and Co-morbidities  Goal: Patient's chronic conditions and co-morbidity symptoms are monitored and maintained or improved  8/25/2023 2339 by David Triplett RN  Outcome: Progressing     Problem: Skin/Tissue Integrity  Goal: Absence of new skin breakdown  Description: 1. Monitor for areas of redness and/or skin breakdown  2. Assess vascular access sites hourly  3. Every 4-6 hours minimum:  Change oxygen saturation probe site  4. Every 4-6 hours:  If on nasal continuous positive airway pressure, respiratory therapy assess nares and determine need for appliance change or resting period.   8/25/2023 2339 by David Triplett RN  Outcome: Progressing     Problem: Infection - Adult  Goal: Absence of infection at discharge  8/25/2023 2339 by David Triplett RN  Outcome: Progressing

## 2023-08-27 LAB
ALBUMIN SERPL-MCNC: 3.1 G/DL (ref 3.5–5)
ALBUMIN/GLOB SERPL: 0.9 (ref 1.1–2.2)
ALP SERPL-CCNC: 290 U/L (ref 45–117)
ALT SERPL-CCNC: 52 U/L (ref 12–78)
ANION GAP SERPL CALC-SCNC: 12 MMOL/L (ref 5–15)
AST SERPL-CCNC: 46 U/L (ref 15–37)
BILIRUB SERPL-MCNC: 0.4 MG/DL (ref 0.2–1)
BUN SERPL-MCNC: 10 MG/DL (ref 6–20)
BUN/CREAT SERPL: 13 (ref 12–20)
CALCIUM SERPL-MCNC: 8.7 MG/DL (ref 8.5–10.1)
CHLORIDE SERPL-SCNC: 104 MMOL/L (ref 97–108)
CO2 SERPL-SCNC: 25 MMOL/L (ref 21–32)
CREAT SERPL-MCNC: 0.66 MG/DL (ref 0.55–1.02)
CREAT SERPL-MCNC: 0.75 MG/DL (ref 0.55–1.02)
GLOBULIN SER CALC-MCNC: 3.6 G/DL (ref 2–4)
GLUCOSE BLD STRIP.AUTO-MCNC: 113 MG/DL (ref 65–117)
GLUCOSE BLD STRIP.AUTO-MCNC: 119 MG/DL (ref 65–117)
GLUCOSE BLD STRIP.AUTO-MCNC: 127 MG/DL (ref 65–117)
GLUCOSE BLD STRIP.AUTO-MCNC: 148 MG/DL (ref 65–117)
GLUCOSE SERPL-MCNC: 129 MG/DL (ref 65–100)
POTASSIUM SERPL-SCNC: 3.4 MMOL/L (ref 3.5–5.1)
PROT SERPL-MCNC: 6.7 G/DL (ref 6.4–8.2)
SERVICE CMNT-IMP: ABNORMAL
SERVICE CMNT-IMP: NORMAL
SODIUM SERPL-SCNC: 141 MMOL/L (ref 136–145)

## 2023-08-27 PROCEDURE — 6360000002 HC RX W HCPCS: Performed by: STUDENT IN AN ORGANIZED HEALTH CARE EDUCATION/TRAINING PROGRAM

## 2023-08-27 PROCEDURE — 6370000000 HC RX 637 (ALT 250 FOR IP): Performed by: PSYCHIATRY & NEUROLOGY

## 2023-08-27 PROCEDURE — 6370000000 HC RX 637 (ALT 250 FOR IP): Performed by: STUDENT IN AN ORGANIZED HEALTH CARE EDUCATION/TRAINING PROGRAM

## 2023-08-27 PROCEDURE — 36415 COLL VENOUS BLD VENIPUNCTURE: CPT

## 2023-08-27 PROCEDURE — 6370000000 HC RX 637 (ALT 250 FOR IP): Performed by: INTERNAL MEDICINE

## 2023-08-27 PROCEDURE — 2580000003 HC RX 258: Performed by: STUDENT IN AN ORGANIZED HEALTH CARE EDUCATION/TRAINING PROGRAM

## 2023-08-27 PROCEDURE — 1200000000 HC SEMI PRIVATE

## 2023-08-27 PROCEDURE — 82962 GLUCOSE BLOOD TEST: CPT

## 2023-08-27 PROCEDURE — 80053 COMPREHEN METABOLIC PANEL: CPT

## 2023-08-27 RX ORDER — POTASSIUM CHLORIDE 750 MG/1
40 TABLET, FILM COATED, EXTENDED RELEASE ORAL ONCE
Status: COMPLETED | OUTPATIENT
Start: 2023-08-27 | End: 2023-08-27

## 2023-08-27 RX ADMIN — METRONIDAZOLE 500 MG: 500 TABLET ORAL at 06:36

## 2023-08-27 RX ADMIN — METRONIDAZOLE 500 MG: 500 TABLET ORAL at 14:32

## 2023-08-27 RX ADMIN — SODIUM CHLORIDE, PRESERVATIVE FREE 10 ML: 5 INJECTION INTRAVENOUS at 21:09

## 2023-08-27 RX ADMIN — METRONIDAZOLE 500 MG: 500 TABLET ORAL at 21:30

## 2023-08-27 RX ADMIN — HALOPERIDOL 10 MG: 5 TABLET ORAL at 21:03

## 2023-08-27 RX ADMIN — ATORVASTATIN CALCIUM 20 MG: 10 TABLET, FILM COATED ORAL at 08:54

## 2023-08-27 RX ADMIN — VANCOMYCIN HYDROCHLORIDE 1250 MG: 10 INJECTION, POWDER, LYOPHILIZED, FOR SOLUTION INTRAVENOUS at 21:16

## 2023-08-27 RX ADMIN — POTASSIUM CHLORIDE 40 MEQ: 750 TABLET, FILM COATED, EXTENDED RELEASE ORAL at 12:35

## 2023-08-27 RX ADMIN — ENOXAPARIN SODIUM 30 MG: 100 INJECTION SUBCUTANEOUS at 08:55

## 2023-08-27 RX ADMIN — VANCOMYCIN HYDROCHLORIDE 1250 MG: 10 INJECTION, POWDER, LYOPHILIZED, FOR SOLUTION INTRAVENOUS at 09:09

## 2023-08-27 RX ADMIN — BENZTROPINE MESYLATE 1 MG: 1 TABLET ORAL at 21:03

## 2023-08-27 RX ADMIN — CLOZAPINE 300 MG: 100 TABLET ORAL at 21:03

## 2023-08-27 RX ADMIN — SODIUM CHLORIDE, PRESERVATIVE FREE 10 ML: 5 INJECTION INTRAVENOUS at 09:10

## 2023-08-27 RX ADMIN — LITHIUM CARBONATE 600 MG: 300 TABLET, FILM COATED, EXTENDED RELEASE ORAL at 08:54

## 2023-08-27 RX ADMIN — INSULIN GLARGINE 15 UNITS: 100 INJECTION, SOLUTION SUBCUTANEOUS at 21:03

## 2023-08-27 RX ADMIN — HALOPERIDOL 10 MG: 5 TABLET ORAL at 08:54

## 2023-08-27 RX ADMIN — ENOXAPARIN SODIUM 30 MG: 100 INJECTION SUBCUTANEOUS at 21:03

## 2023-08-27 RX ADMIN — LEVOFLOXACIN 750 MG: 750 TABLET, FILM COATED ORAL at 06:36

## 2023-08-27 RX ADMIN — OXYBUTYNIN CHLORIDE 15 MG: 5 TABLET, EXTENDED RELEASE ORAL at 08:54

## 2023-08-27 RX ADMIN — LITHIUM CARBONATE 600 MG: 300 TABLET, FILM COATED, EXTENDED RELEASE ORAL at 21:03

## 2023-08-27 RX ADMIN — AMLODIPINE BESYLATE 10 MG: 5 TABLET ORAL at 08:54

## 2023-08-27 ASSESSMENT — PAIN SCALES - GENERAL: PAINLEVEL_OUTOF10: 0

## 2023-08-27 NOTE — PLAN OF CARE
Care plan reviewed      Problem: Discharge Planning  Goal: Discharge to home or other facility with appropriate resources  Outcome: Progressing  Flowsheets (Taken 8/26/2023 2243)  Discharge to home or other facility with appropriate resources: Identify barriers to discharge with patient and caregiver     Problem: Safety - Adult  Goal: Free from fall injury  Outcome: Progressing     Problem: Chronic Conditions and Co-morbidities  Goal: Patient's chronic conditions and co-morbidity symptoms are monitored and maintained or improved  Outcome: Progressing  Flowsheets (Taken 8/26/2023 2243)  Care Plan - Patient's Chronic Conditions and Co-Morbidity Symptoms are Monitored and Maintained or Improved: Monitor and assess patient's chronic conditions and comorbid symptoms for stability, deterioration, or improvement     Problem: Skin/Tissue Integrity  Goal: Absence of new skin breakdown  Description: 1. Monitor for areas of redness and/or skin breakdown  2. Assess vascular access sites hourly  3. Every 4-6 hours minimum:  Change oxygen saturation probe site  4. Every 4-6 hours:  If on nasal continuous positive airway pressure, respiratory therapy assess nares and determine need for appliance change or resting period.   Outcome: Progressing     Problem: Infection - Adult  Goal: Absence of infection at discharge  Outcome: Progressing  Flowsheets (Taken 8/26/2023 2243)  Absence of infection at discharge: Assess and monitor for signs and symptoms of infection

## 2023-08-28 LAB
CREAT SERPL-MCNC: 0.71 MG/DL (ref 0.55–1.02)
DATE LAST DOSE: NORMAL
DOSE AMOUNT: NORMAL UNITS
DOSE DATE/TIME: NORMAL
EKG ATRIAL RATE: 98 BPM
EKG DIAGNOSIS: NORMAL
EKG P AXIS: 58 DEGREES
EKG P-R INTERVAL: 130 MS
EKG Q-T INTERVAL: 366 MS
EKG QRS DURATION: 86 MS
EKG QTC CALCULATION (BAZETT): 467 MS
EKG R AXIS: -20 DEGREES
EKG T AXIS: 36 DEGREES
EKG VENTRICULAR RATE: 98 BPM
GLUCOSE BLD STRIP.AUTO-MCNC: 128 MG/DL (ref 65–117)
GLUCOSE BLD STRIP.AUTO-MCNC: 138 MG/DL (ref 65–117)
GLUCOSE BLD STRIP.AUTO-MCNC: 161 MG/DL (ref 65–117)
GLUCOSE BLD STRIP.AUTO-MCNC: 204 MG/DL (ref 65–117)
LITHIUM SERPL-SCNC: 0.65 MMOL/L (ref 0.6–1.2)
SERVICE CMNT-IMP: ABNORMAL
VANCOMYCIN SERPL-MCNC: 33.3 UG/ML

## 2023-08-28 PROCEDURE — 80202 ASSAY OF VANCOMYCIN: CPT

## 2023-08-28 PROCEDURE — 82962 GLUCOSE BLOOD TEST: CPT

## 2023-08-28 PROCEDURE — 6360000002 HC RX W HCPCS: Performed by: PSYCHIATRY & NEUROLOGY

## 2023-08-28 PROCEDURE — 36415 COLL VENOUS BLD VENIPUNCTURE: CPT

## 2023-08-28 PROCEDURE — 6360000002 HC RX W HCPCS: Performed by: STUDENT IN AN ORGANIZED HEALTH CARE EDUCATION/TRAINING PROGRAM

## 2023-08-28 PROCEDURE — 2580000003 HC RX 258: Performed by: INTERNAL MEDICINE

## 2023-08-28 PROCEDURE — 6370000000 HC RX 637 (ALT 250 FOR IP): Performed by: STUDENT IN AN ORGANIZED HEALTH CARE EDUCATION/TRAINING PROGRAM

## 2023-08-28 PROCEDURE — 2580000003 HC RX 258: Performed by: STUDENT IN AN ORGANIZED HEALTH CARE EDUCATION/TRAINING PROGRAM

## 2023-08-28 PROCEDURE — 1200000000 HC SEMI PRIVATE

## 2023-08-28 PROCEDURE — 6370000000 HC RX 637 (ALT 250 FOR IP): Performed by: PSYCHIATRY & NEUROLOGY

## 2023-08-28 PROCEDURE — 93010 ELECTROCARDIOGRAM REPORT: CPT | Performed by: SPECIALIST

## 2023-08-28 PROCEDURE — 82565 ASSAY OF CREATININE: CPT

## 2023-08-28 PROCEDURE — 80178 ASSAY OF LITHIUM: CPT

## 2023-08-28 PROCEDURE — 6360000002 HC RX W HCPCS: Performed by: INTERNAL MEDICINE

## 2023-08-28 RX ORDER — BISACODYL 10 MG
10 SUPPOSITORY, RECTAL RECTAL DAILY PRN
Status: DISCONTINUED | OUTPATIENT
Start: 2023-08-28 | End: 2023-08-29 | Stop reason: HOSPADM

## 2023-08-28 RX ADMIN — CLOZAPINE 300 MG: 100 TABLET ORAL at 20:17

## 2023-08-28 RX ADMIN — ONDANSETRON 4 MG: 2 INJECTION INTRAMUSCULAR; INTRAVENOUS at 01:20

## 2023-08-28 RX ADMIN — INSULIN GLARGINE 15 UNITS: 100 INJECTION, SOLUTION SUBCUTANEOUS at 20:30

## 2023-08-28 RX ADMIN — OXYBUTYNIN CHLORIDE 15 MG: 5 TABLET, EXTENDED RELEASE ORAL at 08:26

## 2023-08-28 RX ADMIN — ENOXAPARIN SODIUM 30 MG: 100 INJECTION SUBCUTANEOUS at 08:34

## 2023-08-28 RX ADMIN — BENZTROPINE MESYLATE 1 MG: 1 TABLET ORAL at 20:30

## 2023-08-28 RX ADMIN — LITHIUM CARBONATE 600 MG: 300 TABLET, FILM COATED, EXTENDED RELEASE ORAL at 08:26

## 2023-08-28 RX ADMIN — HALOPERIDOL DECANOATE 250 MG: 100 INJECTION INTRAMUSCULAR at 09:36

## 2023-08-28 RX ADMIN — METRONIDAZOLE 500 MG: 500 TABLET ORAL at 21:48

## 2023-08-28 RX ADMIN — HALOPERIDOL 10 MG: 5 TABLET ORAL at 08:26

## 2023-08-28 RX ADMIN — SODIUM CHLORIDE, PRESERVATIVE FREE 10 ML: 5 INJECTION INTRAVENOUS at 08:25

## 2023-08-28 RX ADMIN — VANCOMYCIN HYDROCHLORIDE 1250 MG: 10 INJECTION, POWDER, LYOPHILIZED, FOR SOLUTION INTRAVENOUS at 08:22

## 2023-08-28 RX ADMIN — METRONIDAZOLE 500 MG: 500 TABLET ORAL at 14:55

## 2023-08-28 RX ADMIN — METRONIDAZOLE 500 MG: 500 TABLET ORAL at 05:44

## 2023-08-28 RX ADMIN — VANCOMYCIN HYDROCHLORIDE 1250 MG: 10 INJECTION, POWDER, LYOPHILIZED, FOR SOLUTION INTRAVENOUS at 18:11

## 2023-08-28 RX ADMIN — ENOXAPARIN SODIUM 30 MG: 100 INJECTION SUBCUTANEOUS at 20:30

## 2023-08-28 RX ADMIN — AMLODIPINE BESYLATE 10 MG: 5 TABLET ORAL at 08:26

## 2023-08-28 RX ADMIN — SODIUM CHLORIDE, PRESERVATIVE FREE 10 ML: 5 INJECTION INTRAVENOUS at 20:17

## 2023-08-28 RX ADMIN — POLYETHYLENE GLYCOL 3350 17 G: 17 POWDER, FOR SOLUTION ORAL at 08:33

## 2023-08-28 RX ADMIN — LITHIUM CARBONATE 600 MG: 300 TABLET, FILM COATED, EXTENDED RELEASE ORAL at 20:17

## 2023-08-28 RX ADMIN — ATORVASTATIN CALCIUM 20 MG: 10 TABLET, FILM COATED ORAL at 08:26

## 2023-08-28 ASSESSMENT — PAIN SCALES - GENERAL: PAINLEVEL_OUTOF10: 0

## 2023-08-28 NOTE — PLAN OF CARE
Problem: Discharge Planning  Goal: Discharge to home or other facility with appropriate resources  Outcome: Progressing  Discharge to home or other facility with appropriate resources:   Identify barriers to discharge with patient and caregiver   Identify discharge learning needs (meds, wound care, etc)     Problem: Safety - Adult  Goal: Free from fall injury  Outcome: Progressing  Free From Fall Injury: Instruct family/caregiver on patient safety     Problem: Chronic Conditions and Co-morbidities  Goal: Patient's chronic conditions and co-morbidity symptoms are monitored and maintained or improved  Outcome: Progressing  Care Plan - Patient's Chronic Conditions and Co-Morbidity Symptoms are Monitored and Maintained or Improved:   Monitor and assess patient's chronic conditions and comorbid symptoms for stability, deterioration, or improvement   Collaborate with multidisciplinary team to address chronic and comorbid conditions and prevent exacerbation or deterioration     Problem: Infection - Adult  Goal: Absence of infection during hospitalization  Outcome: Progressing  Absence of infection during hospitalization:   Assess and monitor for signs and symptoms of infection   Monitor lab/diagnostic results   Monitor all insertion sites i.e., indwelling lines, tubes and drains   Administer medications as ordered     Problem: Cardiovascular - Adult  Goal: Maintains optimal cardiac output and hemodynamic stability  Outcome: Progressing  Maintains optimal cardiac output and hemodynamic stability:   Monitor blood pressure and heart rate   Assess for signs of decreased cardiac output     Problem: Skin/Tissue Integrity - Adult  Goal: Skin integrity remains intact  Outcome: Progressing  Skin Integrity Remains Intact: Monitor for areas of redness and/or skin breakdown     Problem: Metabolic/Fluid and Electrolytes - Adult  Goal: Electrolytes maintained within normal limits  Outcome: Progressing  Electrolytes maintained within

## 2023-08-29 VITALS
HEART RATE: 98 BPM | DIASTOLIC BLOOD PRESSURE: 81 MMHG | TEMPERATURE: 98.6 F | WEIGHT: 232 LBS | BODY MASS INDEX: 39.61 KG/M2 | OXYGEN SATURATION: 100 % | RESPIRATION RATE: 14 BRPM | SYSTOLIC BLOOD PRESSURE: 123 MMHG | HEIGHT: 64 IN

## 2023-08-29 LAB
CLOZAPINE SERPL-MCNC: 1140 NG/ML (ref 350–600)
CLOZAPINE+NOR SERPL-MCNC: 1462 NG/ML
GLUCOSE BLD STRIP.AUTO-MCNC: 158 MG/DL (ref 65–117)
GLUCOSE BLD STRIP.AUTO-MCNC: 176 MG/DL (ref 65–117)
NORCLOZAPINE SERPL-MCNC: 322 NG/ML
SERVICE CMNT-IMP: ABNORMAL
SERVICE CMNT-IMP: ABNORMAL

## 2023-08-29 PROCEDURE — 2580000003 HC RX 258: Performed by: INTERNAL MEDICINE

## 2023-08-29 PROCEDURE — 2580000003 HC RX 258: Performed by: STUDENT IN AN ORGANIZED HEALTH CARE EDUCATION/TRAINING PROGRAM

## 2023-08-29 PROCEDURE — 6370000000 HC RX 637 (ALT 250 FOR IP): Performed by: STUDENT IN AN ORGANIZED HEALTH CARE EDUCATION/TRAINING PROGRAM

## 2023-08-29 PROCEDURE — 6360000002 HC RX W HCPCS: Performed by: INTERNAL MEDICINE

## 2023-08-29 PROCEDURE — 82962 GLUCOSE BLOOD TEST: CPT

## 2023-08-29 PROCEDURE — 6360000002 HC RX W HCPCS: Performed by: STUDENT IN AN ORGANIZED HEALTH CARE EDUCATION/TRAINING PROGRAM

## 2023-08-29 RX ADMIN — OXYBUTYNIN CHLORIDE 15 MG: 5 TABLET, EXTENDED RELEASE ORAL at 08:50

## 2023-08-29 RX ADMIN — METRONIDAZOLE 500 MG: 500 TABLET ORAL at 05:30

## 2023-08-29 RX ADMIN — ENOXAPARIN SODIUM 30 MG: 100 INJECTION SUBCUTANEOUS at 08:50

## 2023-08-29 RX ADMIN — VANCOMYCIN HYDROCHLORIDE 1250 MG: 10 INJECTION, POWDER, LYOPHILIZED, FOR SOLUTION INTRAVENOUS at 03:43

## 2023-08-29 RX ADMIN — LITHIUM CARBONATE 600 MG: 300 TABLET, FILM COATED, EXTENDED RELEASE ORAL at 08:50

## 2023-08-29 RX ADMIN — ATORVASTATIN CALCIUM 20 MG: 10 TABLET, FILM COATED ORAL at 08:50

## 2023-08-29 RX ADMIN — AMLODIPINE BESYLATE 10 MG: 5 TABLET ORAL at 08:50

## 2023-08-29 RX ADMIN — SODIUM CHLORIDE, PRESERVATIVE FREE 10 ML: 5 INJECTION INTRAVENOUS at 08:56

## 2023-08-29 NOTE — BSMART NOTE
Attempted to meet w/ pt face to face, but was unsuccessful d/t pt sleeping. Pt appears to be resting comfortably. Liaison will continue to follow.

## 2023-08-29 NOTE — CARE COORDINATION
BEATRIZ     RUR 16 %    Patient is a Readmit - just discharged from [facility] on 8-10-23  with follow-up listed below. FOLLOW-UP CARE:    Follow-up Information         Follow up With Specialties Details Why Contact Info     Monthly injection   Follow up on 8/28/2023 Haloperidol decanoate 250 mg injection was given on July 31st.  Your next injection is due on August 28th. ACT Team Follow Up   Follow up You will be going home with Nette Lomas. Your ACT team will arrange follow up with your psychiatrist and therapy team, as well as your MEYER. [address], [place], [address]  [phone]     [facility] Crisis   Follow up Call as needed for mental health crisis. [phone]     Weekly labs for clozapine   Follow up on 8/17/2023 Your weekly labs for clozapine are due on August 17th. Your last [place] was 6400 on August 10th. Medical Work-up in progress. CM full assessment in progress.   Initial and Readmit question    Will need to PCP   Will need to verify [id number] Follow-up above   Will need Medicare IMM Process done     [facility] MSW RN    [phone]
BEATRIZ    RUR    PLAN    PCP PSYCH SERVICES    CM met with patient at bedside to inquire about her PCP as she stated [facility] providers are not her PCP. She stated that her PCP is Severiano Siskin, APRN - NP and her OBGYN is Guadalupe Polanco MD.      Updated.
BEATRIZ   RUR  15 %     IDR  Rounds this am this am with MD and team again. Continue Plan of Care IV ABX   REBEKAH 8-29-23 post IV ABX     Plan   PCP-  Mental Health-  l Ms. Caty Rosas  [phone]    Transportation home via [employer]  Second IMM needed       Talked to the [address] today. She request we fax information on patient's condition to review  now for questions  [phone] and then at discharge   Verified patient is followed by the ACTS team .  They see patient in the home at least   3 times a week- about 3-4 hours longer depending on the needs. They take her to her appointments. Assist with grocery shopping and errands. She is followed by PCP Lauren Villeda. She will call me back with the specific appointment time.      [address] [place] MSW RN    [phone]
BEATRIZ   RUR  16 %     IDR rounds again this am with MD and team.   REBEKAH  within 24 hrs post Last IV ABX       Plan   PCP  [address] will arrange and notify patient  [address] team at [facility] vs Medicaid Transportation   Cannot find her [address] in her belonging. Met with patient- regarding [address] - and reaching the son. She did not have the # to give to me. Called the # on the chart and left a Generic VM for return call   Called [facility] back  could not leave VM for her Counselor   Called the Main # at the [address] [place] [address] team   [phone]   Talked the nurse regarding the above   She said she will have someone call me back. She indicates someone from the Complex could let patient into the building. Her son or Son's friend is normally in the home     Request Medications sent to [facility] on [address]-  so they can have her medications delivered.      Kobi
Transition of Care Plan:    RUR: 16%  Prior Level of Functioning: With Supervision  Disposition: Home with Family    Follow up appointments: ON AVS  DME needed: N/A  Transportation at discharge: LYFT due to Medicaid difficulty  IM/IMM Medicare/ letter given:   Is patient a [place] and connected with VA? If yes, was [employer] transfer form completed and VA notified? Caregiver Contact:   Discharge Caregiver contacted prior to discharge? Care Conference needed? Barriers to discharge:     CM worked on patients transportation beginning at [employer] disconnected twice when on phone with [facility] transportation number.    CM then submitted [employer] at approximately 1:40 pm for transportation pickup at 3 pm and  was not confirmed by 3 pm CM phoned the main [place] line again and started new booking   At 327 CM and Nurses station were called and informed that transportation will be there at 330pm.  Floor nurse transported patient down and phoned back     Gregg Garcia RN CM
Independent   Toileting Needs assistance   Homemaking Assistance Independent   Mode of Transportation Cab  (Medicaid transportation)   Occupation On disability   Discharge Planning   Type of Residence Apartment   Living Arrangements Children   Current Services Prior To Admission Other (Comment)  (Mental [facility] - patient indicates get monthly injection -come to her home  need to verify)   DME Ordered? No   Potential Assistance Purchasing Medications No  (Indicates get medications home deliver  [facility] - need to verify)   Type of [address]  (Per patient for [facility] Monthly injections)   Patient expects to be discharged to: [address] [place] [address]   One/Two Story Residence One story   Services At/After Discharge   Transition of Care Consult (CM Consult) Discharge Planning;Transportation Assistance; Other   Services At/After Discharge Outpatient   [employer] Information Provided? No   Mode of Transport at Discharge Other (see comment)   Confirm Follow Up Transport Cab   Condition of Participation: Discharge Planning   The Plan for Transition of Care is related to the following treatment goals: Medical,  Mental Health , Transportation   The Patient and/or Patient Representative was provided with a Choice of Provider? Patient   The Patient and/Or Patient Representative agree with the Discharge Plan? Yes   Freedom of Choice list was provided with basic dialogue that supports the patient's individualized plan of care/goals, treatment preferences, and shares the quality data associated with the providers?   Yes     Readmission Assessment  Number of Days since last admission?: 8-30 days (was on the [facility] unit now on medical unit)  Previous Disposition: Home with Family  What was the patient's/caregiver's perception as to why they think they needed to return back to the hospital?: Other (Comment) (I had nervous break down-  patient on Medical Unit)  Did you visit your Primary Care Physician after you left

## 2023-08-29 NOTE — PLAN OF CARE
Problem: Discharge Planning  Goal: Discharge to home or other facility with appropriate resources  Outcome: Completed  Flowsheets (Taken 8/28/2023 0112 by Lis Erwin RN)  Discharge to home or other facility with appropriate resources:   Identify barriers to discharge with patient and caregiver   Identify discharge learning needs (meds, wound care, etc)     Problem: Safety - Adult  Goal: Free from fall injury  Outcome: Completed  Flowsheets (Taken 8/28/2023 0112 by Lis Erwin RN)  Free From Fall Injury: Instruct family/caregiver on patient safety     Problem: Chronic Conditions and Co-morbidities  Goal: Patient's chronic conditions and co-morbidity symptoms are monitored and maintained or improved  Outcome: Completed  Flowsheets (Taken 8/28/2023 0112 by Lis Erwin RN)  Care Plan - Patient's Chronic Conditions and Co-Morbidity Symptoms are Monitored and Maintained or Improved:   Monitor and assess patient's chronic conditions and comorbid symptoms for stability, deterioration, or improvement   Collaborate with multidisciplinary team to address chronic and comorbid conditions and prevent exacerbation or deterioration     Problem: Skin/Tissue Integrity  Goal: Absence of new skin breakdown  Description: 1. Monitor for areas of redness and/or skin breakdown  2. Assess vascular access sites hourly  3. Every 4-6 hours minimum:  Change oxygen saturation probe site  4. Every 4-6 hours:  If on nasal continuous positive airway pressure, respiratory therapy assess nares and determine need for appliance change or resting period.   Outcome: Completed     Problem: Infection - Adult  Goal: Absence of infection at discharge  Outcome: Completed  Flowsheets (Taken 8/27/2023 0800 by Aroldo Miller RN)  Absence of infection at discharge: Assess and monitor for signs and symptoms of infection  Goal: Absence of infection during hospitalization  Outcome: Completed  Flowsheets (Taken 8/28/2023 0112 by Lis Erwin RN)  Absence of infection

## 2023-08-29 NOTE — DISCHARGE SUMMARY
Discharge Summary   Please note that this dictation was completed with [employer], the computer voice recognition software. Quite often unanticipated grammatical, syntax, homophones, and other interpretive errors are inadvertently transcribed by the computer software. Please disregard these errors. Please excuse any errors that have escaped final proofreading. PATIENT ID: Ashley Slater  MRN: [id number]   YOB: 1960    DATE OF ADMISSION: 8/18/2023  7:26 PM    DATE OF DISCHARGE: 8/29/2023  PRIMARY CARE PROVIDER: DESIRE Wheeler NP         ATTENDING PHYSICIAN: Vinayak Dubois MD  DISCHARGING PROVIDER: Vinayak Dubois MD       CONSULTATIONS: IP CONSULT TO PHARMACY  IP CONSULT TO PSYCHIATRY    PROCEDURES/SURGERIES: * No surgery found *    ADMITTING HPI from excerpted H&P   Ashley Slater is a 66 y.o. female with history of bipolar and schizoaffective disorder, type 2 diabetes, generalized anxiety disorder, morbid obesity who presents to hospital with complaints of diarrhea. Symptom onset was about 24 hours ago when she describes near constant, loose bowel movements. No associated melena or hematochezia. Additionally, she said she had single episode of vaginal bleeding which seemed like a period.-Since resolved. The patient denies any fever, chills, chest or abdominal pain, nausea, vomiting, cough, congestion, recent illness, palpitations, or dysuria. Remarkable vitals on ER Presentation: Heart rate 105  Labs Remarkable for: Potassium 3.7, glucose 214, WBC 18.8  ER Images: Slight inflammatory changes in the sigmoid mesocolon may be related to mild underlying colitis. ER Rx: 3.2 L normal saline bolus, Levaquin, potassium 50 mEq          HOSPITAL COURSE & DISCHARGE DIAGNOSIS/ PLAN:       Staph stimulants bacteremia resolved patient completed treatment in the hospital  -Blood culture 8/18/2023 Staphylococcus simulans  growing 2 out of 4. Repeat blood culture done 8/21/2023 no

## 2023-09-01 ENCOUNTER — FOLLOWUP TELEPHONE ENCOUNTER (OUTPATIENT)
Facility: HOSPITAL | Age: 63
End: 2023-09-01

## 2023-09-01 NOTE — TELEPHONE ENCOUNTER
CM called patient by telephone to perform post discharge assessment and for the purpose of follow up call from inpatient discharge to check on environmental challenges/medications/appointment follow up/and questions/concerns. The call was answered by patient/family/caretaker/agency, introduction self, and explanation and reason for call, name and  confirmed. Pt told CM that she is currently sleeping in bed, but has been doing much better since being home from the hospital. CM discussed pt's discharge paperwork with her to confirm that she is aware of all follow up appointments that were recommended by the attending provider. Pt seemed somewhat confused regarding her discharge instructions and told CM that she is still waking up this morning. Pt did not have any questions or concerns for CM at this time. GRACIELA called the PACT Team at [facility] to follow up regarding pt's discharge needs ( [phone]). GRACIELA spoke with nurse, Jamal Dubin, regarding pt's medications and needed follow up appointments. CM faxed pt's discharge paperwork to the PACT Team at [phone]. Jamal Dubin confirmed that her team is assisting pt with following up with all medications and follow up appts.     Brayan Field, [address] ,   [phone]

## 2024-09-23 NOTE — GROUP NOTE
IP  GROUP DOCUMENTATION INDIVIDUAL                                                                          Group Therapy Note    Date: 1/17/2022    Group Start Time: 1115  Group End Time: 1200  Group Topic: Education Group - Inpatient    SRM 2 BH NON ACUTE    Vel Mahoney    [facility] GROUP DOCUMENTATION GROUP    Group Therapy Note    Facilitated group session focused on introducing information on developing a thought record to help challenge automatic negative thoughts and develop a more accurate view of a situation    Attendees: 5/12         Attendance: Attended    Patient's Goal:  Attend group daily     Interventions/techniques: Informed and Supported    Follows Directions: Followed directions    Interactions: Interacted appropriately    Mental Status: Calm    Behavior/appearance: Cooperative    Goals Achieved: Able to engage in interactions and Able to listen to others      Additional Notes:  Receptive to information discussed on developing a thought record.  Did not share a personal example when she was thinking negative about a stressful event     Sunil Ryan [address] [place] [id number]

## (undated) DEVICE — Z INACTIVE NO USAGE TURNOVER KIT RM CLEANOP

## (undated) DEVICE — ELECTRODE,RADIOTRANSLUCENT,FOAM,3PK: Brand: MEDLINE

## (undated) DEVICE — CATH IV AUTOGRD BC PNK 20GA 25 -- INSYTE

## (undated) DEVICE — SURGICAL PROCEDURE KIT GEN LAPAROSCOPY LF

## (undated) DEVICE — KENDALL SCD EXPRESS SLEEVES, KNEE LENGTH, MEDIUM: Brand: KENDALL SCD

## (undated) DEVICE — DEVON™ KNEE AND BODY STRAP 60" X 3" (1.5 M X 7.6 CM): Brand: DEVON

## (undated) DEVICE — APPLIER LIG CLP 5MM CONTAIN 16 TI CLP DISP ENDO CLP

## (undated) DEVICE — E-Z CLEAN, PTFE COATED, ELECTROSURGICAL LAPAROSCOPIC ELECTRODE, L-HOOK, 33 CM., SINGLE-USE, FOR USE WITH HAND CONTROL PENCIL: Brand: MEGADYNE

## (undated) DEVICE — BLADELESS OPTICAL TROCAR WITH FIXATION CANNULA: Brand: VERSAONE

## (undated) DEVICE — SPECIMEN RETRIEVAL POUCH: Brand: ENDO CATCH GOLD

## (undated) DEVICE — NEEDLE HYPO 22GA L1.5IN BLK S STL HUB POLYPR SHLD REG BVL

## (undated) DEVICE — SOLIDIFIER MEDC 1200ML -- CONVERT TO 356117

## (undated) DEVICE — BLADELESS OPTICAL TROCAR WITH FIXATION CANNULA: Brand: VERSAPORT

## (undated) DEVICE — Device

## (undated) DEVICE — DERMABOND SKIN ADH 0.7ML -- DERMABOND ADVANCED 12/BX

## (undated) DEVICE — REM POLYHESIVE ADULT PATIENT RETURN ELECTRODE: Brand: VALLEYLAB

## (undated) DEVICE — KIT COLON W/ 1.1OZ LUB AND 2 END

## (undated) DEVICE — STERILE POLYISOPRENE POWDER-FREE SURGICAL GLOVES WITH EMOLLIENT COATING: Brand: PROTEXIS

## (undated) DEVICE — BAG BELONG PT PERS CLEAR HANDL

## (undated) DEVICE — CLICKLINE SCISSORS INSERT: Brand: CLICKLINE

## (undated) DEVICE — SOLUTION IV 1000ML 0.9% SOD CHL

## (undated) DEVICE — TROCAR SITE CLOSURE DEVICE: Brand: ENDO CLOSE

## (undated) DEVICE — SET ADMIN 16ML TBNG L100IN 2 Y INJ SITE IV PIGGY BK DISP

## (undated) DEVICE — 1200 GUARD II KIT W/5MM TUBE W/O VAC TUBE: Brand: GUARDIAN

## (undated) DEVICE — (D)SENSOR RMFG 02 PULS OXMTR -- DISC BY MFR USE ITEM 133445

## (undated) DEVICE — 3000CC GUARDIAN II: Brand: GUARDIAN

## (undated) DEVICE — SUTURE SZ 0 27IN 5/8 CIR UR-6  TAPER PT VIOLET ABSRB VICRYL J603H

## (undated) DEVICE — BLUNTFILL: Brand: MONOJECT

## (undated) DEVICE — CUFF RMFG BP INF SZ 11 DISP -- LAWSON OEM ITEM 238915

## (undated) DEVICE — SYR 3ML LL TIP 1/10ML GRAD --

## (undated) DEVICE — SUTURE MCRYL SZ 4-0 L27IN ABSRB UD L19MM PS-2 1/2 CIR PRIM Y426H

## (undated) DEVICE — FILTER SMK EVAC FLO CLR MEGADYNE

## (undated) DEVICE — (D)PREP SKN CHLRAPRP APPL 26ML -- CONVERT TO ITEM 371833

## (undated) DEVICE — UNIVERSAL FIXATION CANNULA: Brand: VERSAONE

## (undated) DEVICE — TUBING INSUFLTN 10FT LUER -- CONVERT TO ITEM 368568

## (undated) DEVICE — CATH IV AUTOGRD BC BLU 22GA 25 -- INSYTE